# Patient Record
Sex: FEMALE | Race: WHITE | Employment: UNEMPLOYED | URBAN - METROPOLITAN AREA
[De-identification: names, ages, dates, MRNs, and addresses within clinical notes are randomized per-mention and may not be internally consistent; named-entity substitution may affect disease eponyms.]

---

## 2017-02-14 ENCOUNTER — OFFICE VISIT (OUTPATIENT)
Dept: FAMILY MEDICINE CLINIC | Age: 22
End: 2017-02-14

## 2017-02-14 VITALS
BODY MASS INDEX: 43.5 KG/M2 | OXYGEN SATURATION: 97 % | WEIGHT: 254.8 LBS | HEIGHT: 64 IN | RESPIRATION RATE: 18 BRPM | HEART RATE: 95 BPM | TEMPERATURE: 98.5 F | DIASTOLIC BLOOD PRESSURE: 77 MMHG | SYSTOLIC BLOOD PRESSURE: 134 MMHG

## 2017-02-14 DIAGNOSIS — R19.7 ACUTE DIARRHEA: Primary | ICD-10-CM

## 2017-02-14 NOTE — PROGRESS NOTES
Patient: Keshav Abarca MRN: [id number]  SSN: [id number]    YOB: 1995  Age: 24 y.o. Sex: female      Date of Service: 2/14/2017   Provider: Katharine Santos   Office Location:   [address] [place] [facility], [address] [place] [address], [address]  Office Phone: [phone]  Office Fax: [phone]        REASON FOR VISIT:   Chief Complaint   Patient presents with    Diarrhea     started last night, on menstrual cycle that started yesterday         VITALS:   There were no vitals taken for this visit. MEDICATIONS:   Current Outpatient Prescriptions   Medication Sig Dispense Refill    albuterol (PROVENTIL HFA, VENTOLIN HFA, PROAIR HFA) 90 mcg/actuation inhaler Take 2 Puffs by inhalation every four (4) hours as needed for Wheezing. 1 Inhaler 3    QUEtiapine (SEROQUEL) 50 mg tablet Take 1 Tab by mouth two (2) times a day. 60 Tab 5    amitriptyline (ELAVIL) 100 mg tablet Take 1 Tab by mouth nightly.  30 Tab 5        ALLERGIES:   Allergies   Allergen Reactions    Amoxicillin Rash    Codeine Other (comments)     \"mean\"          ACTIVE MEDICAL PROBLEMS:  Patient Active Problem List   Diagnosis Code    Migraine G43.909    Menorrhagia N92.0    Tobacco abuse Z72.0    Asthma J45.909    Depression F32.9        MEDICAL/SURGICAL HISTORY:  Past Medical History   Diagnosis Date    Anxiety and depression     Asthma     Hidradenitis suppurativa     Menorrhagia     Migraine     Tobacco abuse      Started around age 15 years      Past Surgical History   Procedure Laterality Date    Pr drain pilonidal cyst complic  2-70-75     Dr. Lexus Kelley Pr remv pilonidal lesion extens N/A 10/21/2016     Dr. Lexus Kelley Hx other surgical       excision pilonidal cyst        FAMILY HISTORY:  Family History   Problem Relation Age of Onset    Hypertension Mother     Other Mother      anxiety, fibromyalgia    Cancer Paternal Grandmother 54     ovarian        SOCIAL HISTORY:  Social History   Substance Use Topics    Smoking status: Current Every Day Smoker     Packs/day: 0.25     Types: Cigarettes     Start date: 1/1/2009    Smokeless tobacco: Never Used    Alcohol use 6.0 oz/week     0 Standard drinks or equivalent, 10 Shots of liquor per week      Comment: rarely            HISTORY OF PRESENT ILLNESS:   Chava Andre is a 24 y.o. female who presents to the office complaining of diarrhea since last night. Patient describes loose to watery stool. She is needing to move her bowels at least once an hour. Admits to crampy lower abdominal pain, usually relieved by moving her bowels, but denies fevers, chills, nausea, vomiting, or blood in stool. She denies ingestion of raw or undercooked meat. Patient reports she mainly needs a note to miss work, as she had to leave her shift early today due to diarrhea. She works in [employer]. REVIEW OF SYSTEMS:  Review of Systems   Constitutional: Negative for chills and fever. Respiratory: Negative for cough, shortness of breath and wheezing. Cardiovascular: Negative for chest pain. Gastrointestinal: Positive for abdominal pain and diarrhea. Negative for blood in stool, constipation, nausea and vomiting. Musculoskeletal: Negative for myalgias. Neurological: Negative for dizziness. PHYSICAL EXAMINATION:  Physical Exam   Constitutional: She is well-developed, well-nourished, and in no distress. Cardiovascular: Normal rate, regular rhythm and normal heart sounds. Exam reveals no gallop and no friction rub. No murmur heard. Pulmonary/Chest: Effort normal and breath sounds normal. She has no wheezes. She has no rales. Abdominal: Soft. She exhibits no distension and no mass. There is no tenderness. There is no rebound and no guarding. bowel sounds hyperactive   Skin: Skin is warm and dry. No rash noted. RESULTS:  No results found for this visit on 02/14/17.      ASSESSMENT/PLAN:  Chloé was seen today for diarrhea. Diagnoses and all orders for this visit:    Acute diarrhea  - Likely viral in etiology  - Advised supportive care with oral rehydration, BRAT diet  - May use OTC Imodium if needed, but discussed that this can sometimes prolong the course of gastrointestinal illness  - Out of work note provided    Follow up as needed if symptoms worsen or fail to resolve within a few days    Patient expresses understanding and is agreeable with the above plan.         PATIENT CARE TEAM:   Patient Care Team:  Allyn Clayton NP as PCP - General (Nurse Practitioner)       ABDOUL Marinelli   February 14, 2017    1:31 PM

## 2017-02-14 NOTE — LETTER
NOTIFICATION RETURN TO WORK / SCHOOL 
 
2/14/2017 1:22 PM 
 
Ms. Minerva Hammond [address] [facility] To Whom It May Concern: 
 
Chloé Be is currently under the care of [facility]. She will return to work/school on: 2/16/17 If there are questions or concerns please have the patient contact our office.  
 
 
 
Sincerely, 
 
 
 
 
ABDOUL Casillas

## 2017-02-14 NOTE — MR AVS SNAPSHOT
Visit Information Date & Time Provider Department Dept. Phone Encounter #  
 2/14/2017  1:00 PM Austyn [facility] [phone] [id number] Upcoming Health Maintenance Date Due  
 HPV AGE 9Y-34Y (1 of 3 - Female 3 Dose Series) 12/19/2006 Pneumococcal 19-64 Medium Risk (1 of 1 - PPSV23) 12/19/2014 PAP AKA CERVICAL CYTOLOGY 12/19/2016 DTaP/Tdap/Td series (2 - Td) 1/24/2026 Allergies as of 2/14/2017  Review Complete On: 2/14/2017 By: ABDOUL Yeung Severity Noted Reaction Type Reactions Amoxicillin  08/15/2014    Rash Codeine  05/12/2016    Other (comments) \"mean\" Current Immunizations  Never Reviewed Name Date Influenza Vaccine (Quad) PF 9/30/2016 Tdap 1/24/2016 12:54 PM  
  
 Not reviewed this visit Vitals BP Pulse Temp Resp Height(growth percentile) Weight(growth percentile) 134/77 (BP 1 Location: Right arm, BP Patient Position: Sitting) 95 98.5 °F (36.9 °C) (Oral) 18 5' 4\" (1.626 m) 254 lb 12.8 oz (115.6 kg) LMP SpO2 BMI OB Status Smoking Status 02/13/2017 (Exact Date) 97% 43.74 kg/m2 Having regular periods Current Every Day Smoker BMI and BSA Data Body Mass Index Body Surface Area 43.74 kg/m 2 2.28 m 2 Preferred Pharmacy [facility] Phone [facility] [facility] [phone] Your Updated Medication List  
  
   
This list is accurate as of: 2/14/17  1:27 PM.  Always use your most recent med list.  
  
  
  
  
 albuterol 90 mcg/actuation inhaler Commonly known as:  PROVENTIL HFA, VENTOLIN HFA, PROAIR HFA Take 2 Puffs by inhalation every four (4) hours as needed for Wheezing. amitriptyline 100 mg tablet Commonly known as:  ELAVIL Take 1 Tab by mouth nightly. QUEtiapine 50 mg tablet Commonly known as:  SEROquel Take 1 Tab by mouth two (2) times a day. Introducing [facility]! Gabriel Santiago introduces [employer] patient portal. Now you can access parts of your medical record, email your doctor's office, and request medication refills online. 1. In your internet browser, go to [email or web address] 2. Click on the First Time User? Click Here link in the Sign In box. You will see the New Member Sign Up page. 3. Enter your [employer] Access Code exactly as it appears below. You will not need to use this code after youve completed the sign-up process. If you do not sign up before the expiration date, you must request a new code. · [employer] Access Code: [email or web address] Expires: 5/15/2017  1:05 PM 
 
4. Enter the last four digits of your Social Security Number (xxxx) and Date of Birth (mm/dd/yyyy) as indicated and click Submit. You will be taken to the next sign-up page. 5. Create a [employer] ID. This will be your [employer] login ID and cannot be changed, so think of one that is secure and easy to remember. 6. Create a [employer] password. You can change your password at any time. 7. Enter your Password Reset Question and Answer. This can be used at a later time if you forget your password. 8. Enter your e-mail address. You will receive e-mail notification when new information is available in [address] 9. Click Sign Up. You can now view and download portions of your medical record. 10. Click the Download Summary menu link to download a portable copy of your medical information. If you have questions, please visit the Frequently Asked Questions section of the [employer] website. Remember, [employer] is NOT to be used for urgent needs. For medical emergencies, dial 911. Now available from your iPhone and Android! Please provide this summary of care documentation to your next provider. Your primary care clinician is listed as Andreia Mai. If you have any questions after today's visit, please call [phone].

## 2017-03-20 ENCOUNTER — TELEPHONE (OUTPATIENT)
Dept: FAMILY MEDICINE CLINIC | Age: 22
End: 2017-03-20

## 2017-03-20 NOTE — TELEPHONE ENCOUNTER
Call made and message left for Pt to call office. Pt  Missed her last appointment and Daxa Cruz NP wanted to know if she would like to schedule a [facility] appointment.

## 2017-04-11 ENCOUNTER — OFFICE VISIT (OUTPATIENT)
Dept: FAMILY MEDICINE CLINIC | Age: 22
End: 2017-04-11

## 2017-04-11 VITALS
DIASTOLIC BLOOD PRESSURE: 88 MMHG | HEART RATE: 97 BPM | OXYGEN SATURATION: 96 % | SYSTOLIC BLOOD PRESSURE: 135 MMHG | HEIGHT: 64 IN | RESPIRATION RATE: 18 BRPM | BODY MASS INDEX: 43.36 KG/M2 | TEMPERATURE: 98.5 F | WEIGHT: 254 LBS

## 2017-04-11 DIAGNOSIS — L73.2 HIDRADENITIS SUPPURATIVA: Primary | ICD-10-CM

## 2017-04-11 RX ORDER — DOXYCYCLINE 100 MG/1
100 TABLET ORAL 2 TIMES DAILY
Qty: 60 TAB | Refills: 2 | Status: SHIPPED | OUTPATIENT
Start: 2017-04-11 | End: 2017-09-12

## 2017-04-11 RX ORDER — CLINDAMYCIN PHOSPHATE 10 MG/G
GEL TOPICAL 2 TIMES DAILY
Qty: 30 ML | Refills: 2 | Status: SHIPPED | OUTPATIENT
Start: 2017-04-11 | End: 2017-09-12

## 2017-04-11 NOTE — PROGRESS NOTES
Patient: Imer Banks MRN: [id number]  SSN: [id number]    YOB: 1995  Age: 24 y.o. Sex: female      Date of Service: 4/11/2017   Provider: Aria Fortune   Office Location:   [address] [place] [facility], [address] [place] [address], [address]  Office Phone: [phone]  Office Fax: [phone]        REASON FOR VISIT:   Chief Complaint   Patient presents with    Rash     pt presents for rash that pt has under arms and under breast area ot states \" that it is uncomfortable and hurts really bad \" and pt has this on [address] [place] [address] Road area as well         VITALS:   Visit Vitals    /88    Pulse 97    Temp 98.5 °F (36.9 °C) (Oral)    Resp 18    Ht 5' 4\" (1.626 m)    Wt 254 lb (115.2 kg)    LMP 03/15/2017    SpO2 96%    BMI 43.6 kg/m2       MEDICATIONS:   Current Outpatient Prescriptions   Medication Sig Dispense Refill    albuterol (PROVENTIL HFA, VENTOLIN HFA, PROAIR HFA) 90 mcg/actuation inhaler Take 2 Puffs by inhalation every four (4) hours as needed for Wheezing. 1 Inhaler 3    QUEtiapine (SEROQUEL) 50 mg tablet Take 1 Tab by mouth two (2) times a day. 60 Tab 5    amitriptyline (ELAVIL) 100 mg tablet Take 1 Tab by mouth nightly.  30 Tab 5        ALLERGIES:   Allergies   Allergen Reactions    Amoxicillin Rash    Codeine Other (comments)     \"mean\"          ACTIVE MEDICAL PROBLEMS:  Patient Active Problem List   Diagnosis Code    Migraine G43.909    Menorrhagia N92.0    Tobacco abuse Z72.0    Asthma J45.909    Depression F32.9        MEDICAL/SURGICAL HISTORY:  Past Medical History:   Diagnosis Date    Anxiety and depression     Asthma     Hidradenitis suppurativa     Menorrhagia     Migraine     Tobacco abuse     Started around age 15 years      Past Surgical History:   Procedure Laterality Date    DRAIN PILONIDAL CYST COMPLIC  6-02-41    Dr. Lopez Petties    [facility]      excision pilonidal cyst    [address] N/A 10/21/2016     Andrew Herita        FAMILY HISTORY:  Family History   Problem Relation Age of Onset    Hypertension Mother     Other Mother      anxiety, fibromyalgia    Cancer Paternal Grandmother 54     ovarian        SOCIAL HISTORY:  Social History   Substance Use Topics    Smoking status: Current Every Day Smoker     Packs/day: 0.25     Types: Cigarettes     Start date: 1/1/2009    Smokeless tobacco: Never Used    Alcohol use 6.0 oz/week     0 Standard drinks or equivalent, 10 Shots of liquor per week      Comment: rarely            HISTORY OF PRESENT ILLNESS:   Radha Araujo is a 24 y.o. female who presents to the office complaining of worsening hidradenitis suppurativa. Patient reports she was diagnosed with this skin condition as a young teenager, and it has waxed and waned in severity over the years. At one point she was on topical clindamycin as well as PO tetracycline with some relief. Lately, symptoms have been worsening and the lesions are very painful. Seems to be exacerbated by sweating and wearing her work uniform (works at [employer]). Unfortunately she does not have medical insurance currently, and cannot afford to see a dermatologist.    REVIEW OF SYSTEMS:  Review of Systems   Constitutional: Negative for chills and fever. Respiratory: Negative for cough, shortness of breath and wheezing. Cardiovascular: Negative for chest pain and palpitations. Skin: Positive for rash. Negative for itching. PHYSICAL EXAMINATION:  Physical Exam   Constitutional: She is well-developed, well-nourished, and in no distress. obese   Cardiovascular: Normal rate, regular rhythm and normal heart sounds. Exam reveals no gallop and no friction rub. No murmur heard. Pulmonary/Chest: Effort normal and breath sounds normal. She has no wheezes. She has no rales. Skin: Skin is warm and dry.    scattered nodular scars, erythematous nodules and pustules in b/l axillae, under breasts b/l        RESULTS:  No results found for this visit on 04/11/17. ASSESSMENT/PLAN:  Chloé was seen today for rash. Diagnoses and all orders for this visit:    Hidradenitis suppurativa  - Will start PO doxycycline (chosen over tetracycline and minocycline due to cost) and topical clindamycin as below   - Follow up in 1 month  - \[facility] [place] [facility]\"" application provided. Patient would be willing to drive to [place] to see derm if needed  Orders:   -     doxycycline (ADOXA) 100 mg tablet; Take 1 Tab by mouth two (2) times a day. -     clindamycin (CLINDAGEL) 1 % topical gel; Apply  to affected area two (2) times a day. disp: 1 30 g tube    Follow up 1 month  sooner PRN    Patient expresses understanding and is agreeable with the above plan.         PATIENT CARE TEAM:   Patient Care Team:  Romi Tarango NP as PCP - General (Nurse Practitioner)       ABDOUL Henriquez   April 11, 2017    2:15 PM

## 2017-05-20 ENCOUNTER — APPOINTMENT (OUTPATIENT)
Dept: CT IMAGING | Age: 22
End: 2017-05-20
Attending: EMERGENCY MEDICINE
Payer: SELF-PAY

## 2017-05-20 ENCOUNTER — HOSPITAL ENCOUNTER (EMERGENCY)
Age: 22
Discharge: HOME | End: 2017-05-20
Attending: EMERGENCY MEDICINE
Payer: SELF-PAY

## 2017-05-20 VITALS
BODY MASS INDEX: 41.83 KG/M2 | SYSTOLIC BLOOD PRESSURE: 160 MMHG | TEMPERATURE: 98.6 F | HEIGHT: 64 IN | DIASTOLIC BLOOD PRESSURE: 88 MMHG | HEART RATE: 100 BPM | RESPIRATION RATE: 18 BRPM | WEIGHT: 245 LBS | OXYGEN SATURATION: 98 %

## 2017-05-20 DIAGNOSIS — R10.9 RIGHT SIDED ABDOMINAL PAIN: Primary | ICD-10-CM

## 2017-05-20 LAB
ANION GAP BLD CALC-SCNC: 4 MMOL/L (ref 3–18)
APPEARANCE UR: ABNORMAL
BACTERIA URNS QL MICRO: ABNORMAL /HPF
BASOPHILS # BLD AUTO: 0 K/UL (ref 0–0.1)
BASOPHILS # BLD: 0 % (ref 0–2)
BILIRUB UR QL: NEGATIVE
BUN SERPL-MCNC: 8 MG/DL (ref 7–18)
BUN/CREAT SERPL: 11 (ref 12–20)
CALCIUM SERPL-MCNC: 9.1 MG/DL (ref 8.5–10.1)
CHLORIDE SERPL-SCNC: 107 MMOL/L (ref 100–108)
CO2 SERPL-SCNC: 29 MMOL/L (ref 21–32)
COLOR UR: YELLOW
CREAT SERPL-MCNC: 0.76 MG/DL (ref 0.6–1.3)
DIFFERENTIAL METHOD BLD: NORMAL
EOSINOPHIL # BLD: 0.3 K/UL (ref 0–0.4)
EOSINOPHIL NFR BLD: 2 % (ref 0–5)
EPITH CASTS URNS QL MICRO: ABNORMAL /LPF (ref 0–5)
ERYTHROCYTE [DISTWIDTH] IN BLOOD BY AUTOMATED COUNT: 14.3 % (ref 11.6–14.5)
GLUCOSE SERPL-MCNC: 96 MG/DL (ref 74–99)
GLUCOSE UR STRIP.AUTO-MCNC: NEGATIVE MG/DL
HCG UR QL: NEGATIVE
HCT VFR BLD AUTO: 39.8 % (ref 35–45)
HGB BLD-MCNC: 13.6 G/DL (ref 12–16)
HGB UR QL STRIP: ABNORMAL
KETONES UR QL STRIP.AUTO: NEGATIVE MG/DL
LEUKOCYTE ESTERASE UR QL STRIP.AUTO: NEGATIVE
LYMPHOCYTES # BLD AUTO: 26 % (ref 21–52)
LYMPHOCYTES # BLD: 3 K/UL (ref 0.9–3.6)
MCH RBC QN AUTO: 29.1 PG (ref 24–34)
MCHC RBC AUTO-ENTMCNC: 34.2 G/DL (ref 31–37)
MCV RBC AUTO: 85 FL (ref 74–97)
MONOCYTES # BLD: 0.7 K/UL (ref 0.05–1.2)
MONOCYTES NFR BLD AUTO: 6 % (ref 3–10)
NEUTS SEG # BLD: 7.4 K/UL (ref 1.8–8)
NEUTS SEG NFR BLD AUTO: 66 % (ref 40–73)
NITRITE UR QL STRIP.AUTO: NEGATIVE
PH UR STRIP: >8.5 [PH] (ref 5–8)
PLATELET # BLD AUTO: 333 K/UL (ref 135–420)
PMV BLD AUTO: 9.7 FL (ref 9.2–11.8)
POTASSIUM SERPL-SCNC: 4 MMOL/L (ref 3.5–5.5)
PROT UR STRIP-MCNC: 30 MG/DL
RBC # BLD AUTO: 4.68 M/UL (ref 4.2–5.3)
RBC #/AREA URNS HPF: ABNORMAL /HPF (ref 0–5)
SODIUM SERPL-SCNC: 140 MMOL/L (ref 136–145)
SP GR UR REFRACTOMETRY: 1.02 (ref 1–1.03)
UROBILINOGEN UR QL STRIP.AUTO: 1 EU/DL (ref 0.2–1)
WBC # BLD AUTO: 11.3 K/UL (ref 4.6–13.2)
WBC URNS QL MICRO: ABNORMAL /HPF (ref 0–4)

## 2017-05-20 PROCEDURE — 74176 CT ABD & PELVIS W/O CONTRAST: CPT

## 2017-05-20 PROCEDURE — 74011250636 HC RX REV CODE- 250/636: Performed by: EMERGENCY MEDICINE

## 2017-05-20 PROCEDURE — 81001 URINALYSIS AUTO W/SCOPE: CPT | Performed by: EMERGENCY MEDICINE

## 2017-05-20 PROCEDURE — 81025 URINE PREGNANCY TEST: CPT | Performed by: EMERGENCY MEDICINE

## 2017-05-20 PROCEDURE — 96374 THER/PROPH/DIAG INJ IV PUSH: CPT

## 2017-05-20 PROCEDURE — 96361 HYDRATE IV INFUSION ADD-ON: CPT

## 2017-05-20 PROCEDURE — 99283 EMERGENCY DEPT VISIT LOW MDM: CPT

## 2017-05-20 PROCEDURE — 80048 BASIC METABOLIC PNL TOTAL CA: CPT | Performed by: EMERGENCY MEDICINE

## 2017-05-20 PROCEDURE — 85025 COMPLETE CBC W/AUTO DIFF WBC: CPT | Performed by: EMERGENCY MEDICINE

## 2017-05-20 RX ORDER — KETOROLAC TROMETHAMINE 30 MG/ML
30 INJECTION, SOLUTION INTRAMUSCULAR; INTRAVENOUS
Status: COMPLETED | OUTPATIENT
Start: 2017-05-20 | End: 2017-05-20

## 2017-05-20 RX ORDER — NAPROXEN 500 MG/1
TABLET ORAL
Qty: 20 TAB | Refills: 0 | Status: SHIPPED | OUTPATIENT
Start: 2017-05-20 | End: 2017-09-12

## 2017-05-20 RX ORDER — CYCLOBENZAPRINE HCL 5 MG
10 TABLET ORAL
Qty: 20 TAB | Refills: 0 | Status: SHIPPED | OUTPATIENT
Start: 2017-05-20 | End: 2017-09-12

## 2017-05-20 RX ADMIN — KETOROLAC TROMETHAMINE 30 MG: 30 INJECTION, SOLUTION INTRAMUSCULAR at 10:18

## 2017-05-20 RX ADMIN — SODIUM CHLORIDE 1000 ML: 900 INJECTION, SOLUTION INTRAVENOUS at 10:18

## 2017-05-20 NOTE — LETTER
NOTIFICATION RETURN TO WORK / SCHOOL 
 
5/20/2017 11:22 AM 
 
Ms. Naresh Garrett [address] [facility] To Whom It May Concern: 
 
Naresh Garrett is currently under the care of [facility] EMERGENCY DEPT. She will return to work/school on: 5/21/19 If there are questions or concerns please have the patient contact our office.  
 
 
 
Sincerely, 
 
 
 
 
Ambrose Golden MD

## 2017-05-20 NOTE — ED PROVIDER NOTES
HPI Comments: Pt with no significant medical history, presents to ED with complaint of intermittent R flank pain over the past 2-3 weeks. She notes that she is currently on her period, denies possibility of pregnancy. She states the pain sometimes makes her nauseated but denies any vomiting. No constipation or diarrhea, no fevers or chills, no melena or hematochezia, no dysuria or observed hematuria. She denies any personal history of renal stones but notes that her mom has had several stones. She denies any history of endometriosis or fibroids. She is not on OCPs. No other acute symptoms or complaints were noted. Past Medical History:   Diagnosis Date    Anxiety and depression     Asthma     Hidradenitis suppurativa     Menorrhagia     Migraine     Tobacco abuse     Started around age 15 years       Past Surgical History:   Procedure Laterality Date    DRAIN PILONIDAL CYST COMPLIC  3-49-99    Dr. Fede Soto HX OTHER SURGICAL      excision pilonidal cyst    REMV PILONIDAL LESION EXTENS N/A 10/21/2016    Dr. Davion Garcia         Family History:   Problem Relation Age of Onset    Hypertension Mother     Other Mother      anxiety, fibromyalgia    Cancer Paternal Grandmother 54     ovarian       Social History     Social History    Marital status: SINGLE     Spouse name: N/A    Number of children: N/A    Years of education: N/A     Occupational History    Not on file.      Social History Main Topics    Smoking status: Current Every Day Smoker     Packs/day: 0.25     Types: Cigarettes     Start date: 1/1/2009    Smokeless tobacco: Never Used    Alcohol use 6.0 oz/week     0 Standard drinks or equivalent, 10 Shots of liquor per week      Comment: rarely    Drug use: No    Sexual activity: Yes     Partners: Male     Birth control/ protection: None     Other Topics Concern    Not on file     Social History Narrative         ALLERGIES: Amoxicillin and Codeine    Review of Systems   Constitutional: Negative for chills, diaphoresis and fever. HENT: Negative. Eyes: Negative for visual disturbance. Respiratory: Negative for chest tightness and shortness of breath. Cardiovascular: Negative for chest pain and palpitations. Gastrointestinal: Positive for nausea. Negative for abdominal pain, anal bleeding, blood in stool, constipation, diarrhea and vomiting. Endocrine: Negative. Genitourinary: Positive for flank pain. Negative for dysuria and hematuria. Musculoskeletal: Negative for back pain, neck pain and neck stiffness. Skin: Negative for pallor. Allergic/Immunologic: Negative. Neurological: Negative for dizziness, syncope, light-headedness and headaches. Hematological: Does not bruise/bleed easily. Vitals:    05/20/17 0855   BP: 160/88   Pulse: 100   Resp: 18   Temp: 98.6 °F (37 °C)   SpO2: 98%   Weight: 111.1 kg (245 lb)   Height: 5' 4\" (1.626 m)            Physical Exam   Constitutional: She is oriented to person, place, and time. She appears well-developed and well-nourished. No distress. Resting comfortably on stretcher   HENT:   Head: Normocephalic and atraumatic. MM moist   Eyes: Conjunctivae and EOM are normal. No scleral icterus. Sclera clear bilaterally   Neck: Normal range of motion. Neck supple. No JVD present. Non-tender to palpation   Cardiovascular: Normal rate, regular rhythm and normal heart sounds. Exam reveals no gallop and no friction rub. No murmur heard. Pulmonary/Chest: Effort normal and breath sounds normal. No respiratory distress. She has no wheezes. She has no rales. She exhibits no tenderness. No crepitance with palpation   Abdominal: Soft. She exhibits no distension. There is no tenderness. Genitourinary:   Genitourinary Comments: No CVA tenderness   Musculoskeletal: She exhibits no tenderness. Normal inspection of upper extremities. No edema noted to bilateral lower extremities   Lymphadenopathy:     She has no cervical adenopathy. Neurological: She is alert and oriented to person, place, and time. No cranial nerve deficit. She exhibits normal muscle tone. Normal motor and sensation bilaterally to upper and lower extremities   Skin: Skin is warm and dry. She is not diaphoretic. No pallor. Psychiatric:   Normal mood and affect. Vitals reviewed. MDM  Number of Diagnoses or Management Options  Right sided abdominal pain:   Diagnosis management comments: Pt with history of intermittent flank pain that is not reproducible on exam.  Reassuring VS.  Family history of stones. Will check labs and CT, no evidence of UTI or systemic infection. IVF ordered. Reassess. Reviewed results with pt. Repeat abdominal exam remains benign. No emergent conditions identified on ED visit. Will d/c to home with Rx for NSAIDs, muscle relaxer. PMD follow up recommended. Pt in agreement with discharge plans.        Amount and/or Complexity of Data Reviewed  Clinical lab tests: ordered and reviewed  Tests in the radiology section of CPT®: ordered and reviewed  Decide to obtain previous medical records or to obtain history from someone other than the patient: yes  Obtain history from someone other than the patient: yes  Independent visualization of images, tracings, or specimens: yes    Risk of Complications, Morbidity, and/or Mortality  Presenting problems: moderate  Management options: moderate      ED Course       Procedures

## 2017-05-20 NOTE — DISCHARGE INSTRUCTIONS

## 2017-07-21 ENCOUNTER — HOSPITAL ENCOUNTER (EMERGENCY)
Age: 22
Discharge: HOME | End: 2017-07-21
Attending: EMERGENCY MEDICINE | Admitting: EMERGENCY MEDICINE
Payer: SELF-PAY

## 2017-07-21 VITALS
SYSTOLIC BLOOD PRESSURE: 137 MMHG | RESPIRATION RATE: 16 BRPM | OXYGEN SATURATION: 99 % | DIASTOLIC BLOOD PRESSURE: 79 MMHG | HEART RATE: 85 BPM | WEIGHT: 245 LBS | BODY MASS INDEX: 42.05 KG/M2 | TEMPERATURE: 98.3 F

## 2017-07-21 DIAGNOSIS — L02.91 ABSCESS: Primary | ICD-10-CM

## 2017-07-21 PROCEDURE — 99282 EMERGENCY DEPT VISIT SF MDM: CPT

## 2017-07-21 RX ORDER — SULFAMETHOXAZOLE AND TRIMETHOPRIM 800; 160 MG/1; MG/1
1 TABLET ORAL 2 TIMES DAILY
Qty: 20 TAB | Refills: 0 | Status: SHIPPED | OUTPATIENT
Start: 2017-07-21 | End: 2017-07-31

## 2017-07-21 RX ORDER — TRAMADOL HYDROCHLORIDE 50 MG/1
50 TABLET ORAL
Qty: 8 TAB | Refills: 0 | Status: SHIPPED | OUTPATIENT
Start: 2017-07-21 | End: 2017-09-12

## 2017-07-21 NOTE — DISCHARGE INSTRUCTIONS

## 2017-07-21 NOTE — ED TRIAGE NOTES
\"I have a skin condition and recently I noticed a large bump on the side of my stomach that has now turned into two bumps and its really dark and draining. \"

## 2017-07-21 NOTE — ED PROVIDER NOTES
HPI Comments: Hx of hidradenitis suppurative presenting to ED with draining lesions on abdomen which has worsened over past few days. Hx of similar. No fever, vomiting, or any other complaints. States she has noted drainage but has resolved. States job wants note to return to work. Patient is a 24 y.o. female presenting with skin problem. The history is provided by the patient. Skin Problem   This is a recurrent problem. Episode onset: chronic. The problem occurs constantly. The problem has not changed since onset. Past Medical History:   Diagnosis Date    Anxiety and depression     Asthma     Hidradenitis suppurativa     Menorrhagia     Migraine     Tobacco abuse     Started around age 15 years       Past Surgical History:   Procedure Laterality Date    DRAIN PILONIDAL CYST COMPLIC  3-16-69    Dr. Alok Orozco HX OTHER SURGICAL      excision pilonidal cyst    REMV PILONIDAL LESION EXTENS N/A 10/21/2016    Dr. Zachariah Tobin         Family History:   Problem Relation Age of Onset    Hypertension Mother     Other Mother      anxiety, fibromyalgia    Cancer Paternal Grandmother 54     ovarian       Social History     Social History    Marital status: SINGLE     Spouse name: N/A    Number of children: N/A    Years of education: N/A     Occupational History    Not on file.      Social History Main Topics    Smoking status: Current Every Day Smoker     Packs/day: 0.25     Types: Cigarettes     Start date: 1/1/2009    Smokeless tobacco: Never Used    Alcohol use 6.0 oz/week     0 Standard drinks or equivalent, 10 Shots of liquor per week      Comment: rarely    Drug use: No    Sexual activity: Yes     Partners: Male     Birth control/ protection: None     Other Topics Concern    Not on file     Social History Narrative         ALLERGIES: Amoxicillin and Codeine    Review of Systems    Vitals:    07/21/17 1137   BP: 137/79   Pulse: 85   Resp: 16   Temp: 98.3 °F (36.8 °C)   SpO2: 99%   Weight: 111.1 kg (245 lb)            Physical Exam   Constitutional: She is oriented to person, place, and time. She appears well-developed and well-nourished. No distress. HENT:   Head: Normocephalic and atraumatic. Mouth/Throat: Oropharynx is clear and moist.   Eyes: Conjunctivae are normal.   Neck: Normal range of motion. Neck supple. Cardiovascular: Normal rate, regular rhythm and normal heart sounds. Pulmonary/Chest: Effort normal and breath sounds normal. No respiratory distress. She has no wheezes. She exhibits no tenderness. Abdominal: Soft. She exhibits no distension. There is no tenderness. There is no rebound. Musculoskeletal: Normal range of motion. Neurological: She is alert and oriented to person, place, and time. No cranial nerve deficit. Coordination normal.   Skin: Skin is warm and dry. No rash noted. She is not diaphoretic.   mulitple purulent lesions to pannus of abdomen/pelvis with localized erythema and hyperpigmentation consistent with furuncles/carbuncles. No drainage or bleeding. No induration or fluctuance noted. Psychiatric: She has a normal mood and affect. Nursing note and vitals reviewed. MDM  Number of Diagnoses or Management Options  Diagnosis management comments: No indication for further I&D, lesions have drained. Will place on po abx and have pt f/u with pcp . return symptoms discussed particular fever, vomiting,  Cellulitis.     ED Course       Procedures

## 2017-09-12 ENCOUNTER — OFFICE VISIT (OUTPATIENT)
Dept: FAMILY MEDICINE CLINIC | Age: 22
End: 2017-09-12

## 2017-09-12 VITALS
OXYGEN SATURATION: 97 % | DIASTOLIC BLOOD PRESSURE: 69 MMHG | RESPIRATION RATE: 18 BRPM | HEART RATE: 95 BPM | BODY MASS INDEX: 42.68 KG/M2 | WEIGHT: 250 LBS | HEIGHT: 64 IN | SYSTOLIC BLOOD PRESSURE: 137 MMHG | TEMPERATURE: 97.2 F

## 2017-09-12 DIAGNOSIS — L73.2 HIDRADENITIS SUPPURATIVA: ICD-10-CM

## 2017-09-12 DIAGNOSIS — J30.2 SEASONAL ALLERGIC RHINITIS, UNSPECIFIED ALLERGIC RHINITIS TRIGGER: Primary | ICD-10-CM

## 2017-09-12 RX ORDER — SULFAMETHOXAZOLE AND TRIMETHOPRIM 800; 160 MG/1; MG/1
1 TABLET ORAL 2 TIMES DAILY
Qty: 20 TAB | Refills: 0 | Status: SHIPPED | OUTPATIENT
Start: 2017-09-12 | End: 2017-09-22

## 2017-09-12 RX ORDER — FLUTICASONE PROPIONATE 50 MCG
2 SPRAY, SUSPENSION (ML) NASAL DAILY
Qty: 1 BOTTLE | Refills: 0 | Status: SHIPPED | OUTPATIENT
Start: 2017-09-12 | End: 2022-01-13

## 2017-09-12 RX ORDER — LORATADINE 10 MG/1
10 TABLET ORAL DAILY
Qty: 100 TAB | Refills: 0 | Status: SHIPPED | OUTPATIENT
Start: 2017-09-12 | End: 2022-01-13

## 2017-09-12 NOTE — PROGRESS NOTES
HISTORY OF PRESENT ILLNESS  Poncho Salazar is a 24 y.o. female. 9/12/2017  2:39 PM    Chief Complaint   Patient presents with    Sore Throat     patient experiencing sore throat with a productive cough, clear sputum  x 1 day        HPI: Here today for complaints of ear popping and fullness, sneezing, sore throat, runny nose with some congestion, and an occasionally cough productive of clear phlegm. Has not been taking anything for symptoms. Symptoms have been occuring for a few days, but worse today. No sick contacts. Did not get flu shot. Would like to get an Rx for Bactrim to take in case of flare of hidradenitis suppurativa. Last CPE 10/2016. Review of Systems   Constitutional: Positive for malaise/fatigue. Negative for chills and fever. HENT: Positive for congestion, ear pain and sore throat. Eyes: Negative for double vision, photophobia and pain. Respiratory: Positive for cough. Negative for shortness of breath and wheezing. Cardiovascular: Negative for chest pain, palpitations and leg swelling. Gastrointestinal: Negative for abdominal pain, constipation, diarrhea, nausea and vomiting. Genitourinary: Negative for dysuria, frequency and urgency. Musculoskeletal: Negative for back pain, joint pain and myalgias. Skin: Negative for rash. Neurological: Negative for dizziness, weakness and headaches. Psychiatric/Behavioral: Negative for depression, substance abuse and suicidal ideas. The patient is not nervous/anxious and does not have insomnia.          PHQ Screening   PHQ over the last two weeks 9/12/2017   Little interest or pleasure in doing things Not at all   Feeling down, depressed or hopeless Not at all   Total Score PHQ 2 0         History  Past Medical History:   Diagnosis Date    Anxiety and depression     Asthma     Hidradenitis suppurativa     Menorrhagia     Migraine     Tobacco abuse     Started around age 15 years       Past Surgical History:   Procedure Laterality Date    DRAIN PILONIDAL CYST COMPLIC  3-28-22    Dr. Zackery Matthew    HX OTHER SURGICAL      excision pilonidal cyst    REMV PILONIDAL LESION EXTENS N/A 10/21/2016    Dr. Zackery Matthew       Social History     Social History    Marital status: SINGLE     Spouse name: N/A    Number of children: N/A    Years of education: N/A     Occupational History    Not on file. Social History Main Topics    Smoking status: Current Every Day Smoker     Packs/day: 0.25     Types: Cigarettes     Start date: 1/1/2009    Smokeless tobacco: Never Used    Alcohol use 1.2 oz/week     0 Standard drinks or equivalent, 2 Shots of liquor per week      Comment: rarely    Drug use: No    Sexual activity: Yes     Partners: Male     Birth control/ protection: None     Other Topics Concern    Not on file     Social History Narrative       Allergies   Allergen Reactions    Amoxicillin Rash    Codeine Other (comments)     \"mean\"         Current Outpatient Prescriptions   Medication Sig Dispense Refill    albuterol (PROVENTIL HFA, VENTOLIN HFA, PROAIR HFA) 90 mcg/actuation inhaler Take 2 Puffs by inhalation every four (4) hours as needed for Wheezing. 1 Inhaler 3         Patient Care Team:  Patient Care Team:  Tali Naqvi NP as PCP - General (Nurse Practitioner)        LABS:  None new to review    RADIOLOGY:  None new to review      Physical Exam   Constitutional: She is oriented to person, place, and time. She appears well-developed and well-nourished. No distress. HENT:   Head: Normocephalic. Right Ear: External ear and ear canal normal. Tympanic membrane is bulging. Tympanic membrane is not injected. A middle ear effusion is present. Left Ear: External ear and ear canal normal. Tympanic membrane is bulging. Tympanic membrane is not injected. A middle ear effusion is present. Nose: Mucosal edema and rhinorrhea present. Right sinus exhibits no maxillary sinus tenderness and no frontal sinus tenderness.  Left sinus exhibits no maxillary sinus tenderness and no frontal sinus tenderness. Mouth/Throat: Uvula is midline and mucous membranes are normal. Posterior oropharyngeal erythema present. No oropharyngeal exudate, posterior oropharyngeal edema or tonsillar abscesses. Eyes: EOM are normal. Pupils are equal, round, and reactive to light. Neck: Normal range of motion. Neck supple. No thyromegaly present. Cardiovascular: Normal rate, regular rhythm and normal heart sounds. No murmur heard. Pulmonary/Chest: Effort normal and breath sounds normal. No respiratory distress. Abdominal: Soft. Bowel sounds are normal. There is no tenderness. Lymphadenopathy:     She has no cervical adenopathy. Neurological: She is alert and oriented to person, place, and time. Skin: Skin is warm and dry. Psychiatric: Her speech is normal.        Vitals:    09/12/17 1416 09/12/17 1420   BP: 140/73 137/69   Pulse: 99 95   Resp:  18   Temp: 97.2 °F (36.2 °C)    TempSrc: Oral    SpO2: 98% 97%   Weight: 250 lb (113.4 kg)    Height: 5' 4\" (1.626 m)    PainSc:   8    PainLoc: Throat        ASSESSMENT and PLAN  Diagnoses and all orders for this visit:    Seasonal allergic rhinitis, unspecified allergic rhinitis trigger  *Appears to be allergic rhinitis in nature causing upper respiratory symptoms. Advised on symptom management including daily antihistamine and steroid nasal spray. PRN decongestant. *Call or RTO if not improving in about 1-2 weeks. -     loratadine (CLARITIN) 10 mg tablet; Take 1 Tab by mouth daily. -     fluticasone (FLONASE) 50 mcg/actuation nasal spray; 2 Sprays by Both Nostrils route daily. Hidradenitis suppurativa  *Sent in Rx for use in case of flare   -     trimethoprim-sulfamethoxazole (BACTRIM DS, SEPTRA DS) 160-800 mg per tablet; Take 1 Tab by mouth two (2) times a day for 10 days. *Plan of care reviewed with patient. Patient in agreement with plan and expresses understanding.  All questions answered and patient encouraged to call or RTO if further questions or concerns. Follow-up Disposition:  Return if symptoms worsen or fail to improve.

## 2017-09-12 NOTE — LETTER
NOTIFICATION RETURN TO WORK / SCHOOL 
 
9/12/2017 2:41 PM 
 
Ms. Jason Ambrocio [address] [facility] To Whom It May Concern: 
 
Jason Ambrocio is currently under the care of [facility]. She was seen today in our office. Please excuse her from work today and tomorrow. If there are questions or concerns please have the patient contact our office.  
 
 
 
Sincerely, 
 
 
Linda Herrera, NP

## 2017-09-18 ENCOUNTER — TELEPHONE (OUTPATIENT)
Dept: FAMILY MEDICINE CLINIC | Age: 22
End: 2017-09-18

## 2017-09-18 NOTE — TELEPHONE ENCOUNTER
Patient called and stated that she was informed by Bee Crowe to call the office and let her know if her allergies have not gotten any better and she wanted to inform her that they have not gotten better at all. Would like to know if something else could be called in to replace the medication that she was prescribed during the previous visit.

## 2017-09-19 ENCOUNTER — TELEPHONE (OUTPATIENT)
Dept: FAMILY MEDICINE CLINIC | Age: 22
End: 2017-09-19

## 2017-09-19 RX ORDER — AZITHROMYCIN 250 MG/1
TABLET, FILM COATED ORAL
Qty: 6 TAB | Refills: 0 | Status: SHIPPED | OUTPATIENT
Start: 2017-09-19 | End: 2017-09-24

## 2017-09-19 NOTE — TELEPHONE ENCOUNTER
Patient states that she was informed during her last visit to call back if her symptoms have not improved and she states that they have actually gotten worse. She would like to know if the medication that was mentioned could be called in to her listed pharmacy.

## 2017-09-19 NOTE — TELEPHONE ENCOUNTER
9/19/2017  1:51 PM    Chief Complaint   Patient presents with    Request For New Medication       Called patient at this time regarding medication request. This is related to ENT complaints, not skin complaints that Bactrim was sent into the pharmacy for. She reports that she continues to have similar symptoms as before and is coughing more. Denies any SOB or wheezing. Has been taking OTC medications helps some. Will send in [address] at this time in case of bacterial component due to duration of symptoms. She missed work yesterday- work note printed for her to . Cancelled appointment for later today- not needed. She will RTO for further evaluation if not improving after Zpack. *Plan of care reviewed with patient. Patient in agreement with plan and expresses understanding. All questions answered and patient encouraged to call or RTO if further questions or concerns.

## 2017-09-19 NOTE — LETTER
NOTIFICATION RETURN TO WORK / SCHOOL 
 
9/19/2017 1:51 PM 
 
Ms. Lita Riojas [address] [facility] To Whom It May Concern: 
 
Lita Riojas is currently under the care of [facility]. Please excuse her from work 9/18/17 for illness. If there are questions or concerns please have the patient contact our office.  
 
 
 
Sincerely, 
 
 
Sobeida Chi NP

## 2017-09-19 NOTE — TELEPHONE ENCOUNTER
9/19/2017  8:18 AM    Chief Complaint   Patient presents with    Sinus Infection       Attempted to contact patient at this time to discuss symptoms and consider alternative treatment. Noted that she is scheduled for today; however, I most likely could have taken care of this over the phone. Unable to reach because all phone numbers are out of service or unable to take calls.

## 2017-09-19 NOTE — TELEPHONE ENCOUNTER
Call made to SONU flower for her to call office and that she can call her pharmacy for the medication that has been called in. AB called in by the provider at last visit just in case she had a flare up. Forwarded to the provide for review.

## 2018-04-09 ENCOUNTER — HOSPITAL ENCOUNTER (EMERGENCY)
Age: 23
Discharge: HOME | End: 2018-04-09
Attending: EMERGENCY MEDICINE
Payer: SELF-PAY

## 2018-04-09 VITALS
DIASTOLIC BLOOD PRESSURE: 98 MMHG | HEART RATE: 82 BPM | RESPIRATION RATE: 18 BRPM | OXYGEN SATURATION: 99 % | SYSTOLIC BLOOD PRESSURE: 151 MMHG | HEIGHT: 64 IN | BODY MASS INDEX: 44.39 KG/M2 | WEIGHT: 260 LBS | TEMPERATURE: 98.7 F

## 2018-04-09 DIAGNOSIS — L03.119 CELLULITIS AND ABSCESS OF LEG, EXCEPT FOOT: Primary | ICD-10-CM

## 2018-04-09 DIAGNOSIS — L02.419 CELLULITIS AND ABSCESS OF LEG, EXCEPT FOOT: Primary | ICD-10-CM

## 2018-04-09 LAB — HCG UR QL: NEGATIVE

## 2018-04-09 PROCEDURE — 74011000250 HC RX REV CODE- 250: Performed by: EMERGENCY MEDICINE

## 2018-04-09 PROCEDURE — 77030019895 HC PCKNG STRP IODO -A

## 2018-04-09 PROCEDURE — 81025 URINE PREGNANCY TEST: CPT | Performed by: EMERGENCY MEDICINE

## 2018-04-09 PROCEDURE — 75810000289 HC I&D ABSCESS SIMP/COMP/MULT

## 2018-04-09 PROCEDURE — 99283 EMERGENCY DEPT VISIT LOW MDM: CPT

## 2018-04-09 PROCEDURE — 74011250637 HC RX REV CODE- 250/637: Performed by: EMERGENCY MEDICINE

## 2018-04-09 RX ORDER — IBUPROFEN 800 MG/1
800 TABLET ORAL
Qty: 20 TAB | Refills: 0 | Status: SHIPPED | OUTPATIENT
Start: 2018-04-09 | End: 2018-04-16

## 2018-04-09 RX ORDER — OXYCODONE AND ACETAMINOPHEN 5; 325 MG/1; MG/1
TABLET ORAL
Qty: 8 TAB | Refills: 0 | Status: SHIPPED | OUTPATIENT
Start: 2018-04-09 | End: 2018-05-09

## 2018-04-09 RX ORDER — LIDOCAINE HYDROCHLORIDE AND EPINEPHRINE 10; 10 MG/ML; UG/ML
10 INJECTION, SOLUTION INFILTRATION; PERINEURAL ONCE
Status: COMPLETED | OUTPATIENT
Start: 2018-04-09 | End: 2018-04-09

## 2018-04-09 RX ORDER — HYDROCODONE BITARTRATE AND ACETAMINOPHEN 5; 325 MG/1; MG/1
1 TABLET ORAL
Status: COMPLETED | OUTPATIENT
Start: 2018-04-09 | End: 2018-04-09

## 2018-04-09 RX ORDER — SULFAMETHOXAZOLE AND TRIMETHOPRIM 800; 160 MG/1; MG/1
1 TABLET ORAL 2 TIMES DAILY
Qty: 20 TAB | Refills: 0 | Status: SHIPPED | OUTPATIENT
Start: 2018-04-09 | End: 2018-04-19

## 2018-04-09 RX ADMIN — HYDROCODONE BITARTRATE AND ACETAMINOPHEN 1 TABLET: 5; 325 TABLET ORAL at 12:08

## 2018-04-09 RX ADMIN — LIDOCAINE HYDROCHLORIDE AND EPINEPHRINE 100 MG: 10; 10 INJECTION, SOLUTION INFILTRATION; PERINEURAL at 12:09

## 2018-04-09 NOTE — DISCHARGE INSTRUCTIONS

## 2018-04-09 NOTE — ED PROVIDER NOTES
EMERGENCY DEPARTMENT HISTORY AND PHYSICAL EXAM    11:25 AM      Date: 4/9/2018  Patient Name: Analisa Collado    History of Presenting Illness     Chief Complaint   Patient presents with    Skin Problem         History Provided By: Patient    Chief Complaint: Abscess  Duration:  1 weel  Timing:  Progressive and Worsening  Location: L groin  Quality: Not reported  Severity: 6 out of 10  Modifying Factors: None  Associated Symptoms: denies any other associated signs or symptoms      Additional History (Context): Analisa Collado is a 25 y.o. female with PMHx of hidradenitis suppurativa who presents to the ED with c/o progressively worsening L groin abscess with 6/10 pain,  present for hte past week. Patient reports she has had 2x surgeries in the past on her back for similar abscesses. Admits she was on Doxycycline as a prophylaxis but has stopped taking it. Denies any use of antibiotics currently. Unsure of LNMP, denies chance of pregnancy. Claims she is followed by [facility]. No modifying or aggravating factors were reported. No other symptoms or concerns were expressed. PCP: Chase Garsia NP    Current Outpatient Prescriptions   Medication Sig Dispense Refill    trimethoprim-sulfamethoxazole (BACTRIM DS) 160-800 mg per tablet Take 1 Tab by mouth two (2) times a day for 10 days. 20 Tab 0    ibuprofen (MOTRIN) 800 mg tablet Take 1 Tab by mouth every six (6) hours as needed for Pain for up to 7 days. 20 Tab 0    oxyCODONE-acetaminophen (PERCOCET) 5-325 mg per tablet Take 1 tablet every 4-6 hours as needed for pain control. If you were instructed to try over the counter ibuprofen or tylenol, only take the percocet for pain not controlled with the over the counter medication. 8 Tab 0    loratadine (CLARITIN) 10 mg tablet Take 1 Tab by mouth daily. 100 Tab 0    fluticasone (FLONASE) 50 mcg/actuation nasal spray 2 Sprays by Both Nostrils route daily.  1 Bottle 0    albuterol (PROVENTIL HFA, VENTOLIN HFA, PROAIR HFA) 90 mcg/actuation inhaler Take 2 Puffs by inhalation every four (4) hours as needed for Wheezing. 1 Inhaler 3       Past History     Past Medical History:  Past Medical History:   Diagnosis Date    Anxiety and depression     Asthma     Hidradenitis suppurativa     Menorrhagia     Migraine     Tobacco abuse     Started around age 15 years       Past Surgical History:  Past Surgical History:   Procedure Laterality Date    DRAIN PILONIDAL CYST COMPLIC  9-55-09    Dr. Naye Ramirez HX OTHER SURGICAL      excision pilonidal cyst    REMV PILONIDAL LESION EXTENS N/A 10/21/2016    Dr. Юлия More       Family History:  Family History   Problem Relation Age of Onset    Hypertension Mother     Other Mother      anxiety, fibromyalgia    Cancer Paternal Grandmother 54     ovarian       Social History:  Social History   Substance Use Topics    Smoking status: Current Every Day Smoker     Packs/day: 0.25     Types: Cigarettes     Start date: 1/1/2009    Smokeless tobacco: Never Used    Alcohol use 1.2 oz/week     0 Standard drinks or equivalent, 2 Shots of liquor per week      Comment: rarely       Allergies: Allergies   Allergen Reactions    Amoxicillin Rash    Codeine Other (comments)     \"mean\"           Review of Systems       Review of Systems   Constitutional: Negative for activity change, fatigue and fever. HENT: Negative for congestion and rhinorrhea. Eyes: Negative for visual disturbance. Respiratory: Negative for shortness of breath. Cardiovascular: Negative for chest pain and palpitations. Gastrointestinal: Negative for abdominal pain, diarrhea, nausea and vomiting. Genitourinary: Negative for dysuria and hematuria. Musculoskeletal: Negative for back pain. Skin:        Positive for abscess L groin   Neurological: Negative for dizziness, weakness and light-headedness. All other systems reviewed and are negative.         Physical Exam     Visit Vitals  BP (!) 151/98 (BP 1 Location: Left arm, BP Patient Position: At rest)    Pulse 82    Temp 98.7 °F (37.1 °C)    Resp 18    Ht 5' 4\" (1.626 m)    Wt 117.9 kg (260 lb)    SpO2 99%    BMI 44.63 kg/m2         Physical Exam   Constitutional: She is oriented to person, place, and time. She appears well-developed and well-nourished. No distress. HENT:   Head: Normocephalic and atraumatic. Right Ear: External ear normal.   Left Ear: External ear normal.   Nose: Nose normal.   Mouth/Throat: Oropharynx is clear and moist.   Eyes: Conjunctivae and EOM are normal. Pupils are equal, round, and reactive to light. No scleral icterus. Neck: Normal range of motion. Neck supple. No JVD present. No tracheal deviation present. No thyromegaly present. Cardiovascular: Normal rate, regular rhythm, normal heart sounds and intact distal pulses. Exam reveals no gallop and no friction rub. No murmur heard. Pulmonary/Chest: Effort normal and breath sounds normal. She exhibits no tenderness. Abdominal: Soft. Bowel sounds are normal. She exhibits no distension. There is no tenderness. There is no rebound and no guarding. Musculoskeletal: Normal range of motion. She exhibits no edema or tenderness. Lymphadenopathy:     She has no cervical adenopathy. Neurological: She is alert and oriented to person, place, and time. No cranial nerve deficit. Coordination normal.   No sensory loss, Gait normal, Motor 5/5   Skin: Skin is warm and dry. There is erythema. Multiple areas of scarring from previous skin infection. L groin with a 3x2 cm area of fluctuance and erythema with some bruising noted. Psychiatric: She has a normal mood and affect. Her behavior is normal. Judgment and thought content normal.   Nursing note and vitals reviewed.     I&D Abcess Complex  Date/Time: 4/9/2018 12:15 PM  Performed by: Cristhian Phillips by: Brianna Bosch     Consent:     Consent obtained:  Verbal and written    Consent given by:  Patient    Risks discussed:  Bleeding, incomplete drainage, pain and infection    Alternatives discussed:  No treatment and delayed treatment  Location:     Type:  Abscess    Location:  Lower extremity    Lower extremity location:  Leg    Leg location:  L upper leg  Pre-procedure details:     Skin preparation:  Betadine  Anesthesia (see MAR for exact dosages): Anesthesia method:  Local infiltration    Local anesthetic:  Lidocaine 1% WITH epi  Procedure type:     Complexity:  Complex  Procedure details:     Needle aspiration: no      Incision types:  Stab incision    Incision depth:  Subcutaneous    Scalpel blade:  11    Wound management:  Probed and deloculated and irrigated with saline    Drainage:  Purulent and bloody    Drainage amount: Moderate    Packing materials:  1/4 in iodoform gauze  Post-procedure details:     Patient tolerance of procedure: Tolerated well, no immediate complications  Comments:      Pt reports immediate relief of pressure  Pt was dc after procedure            Diagnostic Study Results     Labs -  Recent Results (from the past 12 hour(s))   HCG URINE, QL    Collection Time: 04/09/18 11:31 AM   Result Value Ref Range    HCG urine, QL NEGATIVE  NEG         Radiologic Studies -   No orders to display         Medical Decision Making   I am the first provider for this patient. I reviewed the vital signs, available nursing notes, past medical history, past surgical history, family history and social history. Vital Signs-Reviewed the patient's vital signs. Pulse Oximetry Analysis -  99% on room air, normal    Records Reviewed: Nursing Notes (Time of Review: 11:25 AM)    ED Course: Progress Notes, Reevaluation, and Consults:    Provider Notes (Medical Decision Making): 25 y.o. with history of hidradenitis suppurativa with 2 surgeries in the past presents with L groin abscess. Denies fever or chills. Patient was on Doxycycline for prophylactic use but has stopped it.  Unsure if pregnant, will obtain urine pregnancy. OLIVER Spektor will I&D and will plan to treat for MRSA with close outpatient care. Procedures: I&D, successful    Core Measures: none    Critical Care Time: 0        Diagnosis     Clinical Impression:   1. Cellulitis and abscess of leg, except foot        Disposition: home, f/u in 2 days for wound recheck/repacking    Follow-up Information     Follow up With Details Comments Contact Info    [facility] EMERGENCY DEPT In 2 days For wound re-check. Return sooner for any changes or new symptoms [address] [place] [id number]  [phone]           Patient's Medications   Start Taking    IBUPROFEN (MOTRIN) 800 MG TABLET    Take 1 Tab by mouth every six (6) hours as needed for Pain for up to 7 days. OXYCODONE-ACETAMINOPHEN (PERCOCET) 5-325 MG PER TABLET    Take 1 tablet every 4-6 hours as needed for pain control. If you were instructed to try over the counter ibuprofen or tylenol, only take the percocet for pain not controlled with the over the counter medication. TRIMETHOPRIM-SULFAMETHOXAZOLE (BACTRIM DS) 160-800 MG PER TABLET    Take 1 Tab by mouth two (2) times a day for 10 days. Continue Taking    ALBUTEROL (PROVENTIL HFA, VENTOLIN HFA, PROAIR HFA) 90 MCG/ACTUATION INHALER    Take 2 Puffs by inhalation every four (4) hours as needed for Wheezing. FLUTICASONE (FLONASE) 50 MCG/ACTUATION NASAL SPRAY    2 Sprays by Both Nostrils route daily. LORATADINE (CLARITIN) 10 MG TABLET    Take 1 Tab by mouth daily.    These Medications have changed    No medications on file   Stop Taking    No medications on file     _______________________________    Attestations:  Carlo Billingsley acting as a scribe for and in the presence of June Foster MD      April 09, 2018 at 11:25 AM       Provider Attestation:      I personally performed the services described in the documentation, reviewed the documentation, as recorded by the scribe in my presence, and it accurately and completely records my words and actions.  April 09, 2018 at 11:25 AM - Josias Holder MD    _______________________________

## 2018-05-09 ENCOUNTER — OFFICE VISIT (OUTPATIENT)
Dept: FAMILY MEDICINE CLINIC | Age: 23
End: 2018-05-09

## 2018-05-09 VITALS
HEART RATE: 92 BPM | BODY MASS INDEX: 44.22 KG/M2 | HEIGHT: 64 IN | OXYGEN SATURATION: 99 % | DIASTOLIC BLOOD PRESSURE: 95 MMHG | WEIGHT: 259 LBS | TEMPERATURE: 97.5 F | RESPIRATION RATE: 18 BRPM | SYSTOLIC BLOOD PRESSURE: 149 MMHG

## 2018-05-09 DIAGNOSIS — F33.9 RECURRENT MAJOR DEPRESSIVE DISORDER, REMISSION STATUS UNSPECIFIED (HCC): Primary | ICD-10-CM

## 2018-05-09 DIAGNOSIS — R45.86 MOOD SWING: ICD-10-CM

## 2018-05-09 DIAGNOSIS — E66.01 CLASS 3 SEVERE OBESITY DUE TO EXCESS CALORIES WITHOUT SERIOUS COMORBIDITY WITH BODY MASS INDEX (BMI) OF 40.0 TO 44.9 IN ADULT (HCC): ICD-10-CM

## 2018-05-09 DIAGNOSIS — F41.9 ANXIOUS MOOD: ICD-10-CM

## 2018-05-09 DIAGNOSIS — F50.81 BINGE EATING DISORDER: ICD-10-CM

## 2018-05-09 RX ORDER — FLUOXETINE HYDROCHLORIDE 20 MG/1
20 CAPSULE ORAL DAILY
Qty: 30 CAP | Refills: 3 | Status: SHIPPED | OUTPATIENT
Start: 2018-05-09 | End: 2019-09-15 | Stop reason: SDUPTHER

## 2018-05-09 NOTE — PROGRESS NOTES
Linda Lainez is a 25 y.o. female here today with c/o depression and becoming anti social and would like to possible start Zoloft. Learning assessment previously completed.

## 2018-05-09 NOTE — PROGRESS NOTES
HISTORY OF PRESENT ILLNESS  Omar Klein is a 25 y.o. female. HPI  Omar Klein is a 25 y.o. female who presents to the office today for depression. She is new to me. She was previously seeing Evangelina Madrigal. She has a hx of depression and anger issues since she was a teen. Mom wanted to seek medical treatment, but at that time Chloé was resistant and did not want to go to a therapist. She says she is tired of feeling down. She will cry for no reason. She sleeps a lot and sometimes wakes up in the late afternoon. Her appetite has increased and she finds herself binge eating at night. She will call her mom afterwards and cry because she feels guilty about it. She has mood swings, snaps at her boyfriend and parents. Her biological father has bipolar disorder. Mom has depression, anxiety and fibromyalgia. She had thoughts of suicide many years ago, but denies active and current thoughts or ideations of S/H. Smokes 1/2 ppd, mainly because this helps calm her down. She will drink alcohol occasionally, few times/month. Denies drug use. She is living in an apartment with her boyfriend, and finances stress her out. She currently works at [employer], but is trying to get a job with [employer] which will be a $4/hour raise. Chief Complaint   Patient presents with    Depression       Current Outpatient Prescriptions on File Prior to Visit   Medication Sig Dispense Refill    loratadine (CLARITIN) 10 mg tablet Take 1 Tab by mouth daily. 100 Tab 0    fluticasone (FLONASE) 50 mcg/actuation nasal spray 2 Sprays by Both Nostrils route daily. 1 Bottle 0    albuterol (PROVENTIL HFA, VENTOLIN HFA, PROAIR HFA) 90 mcg/actuation inhaler Take 2 Puffs by inhalation every four (4) hours as needed for Wheezing. 1 Inhaler 3    oxyCODONE-acetaminophen (PERCOCET) 5-325 mg per tablet Take 1 tablet every 4-6 hours as needed for pain control.   If you were instructed to try over the counter ibuprofen or tylenol, only take the percocet for pain not controlled with the over the counter medication. 8 Tab 0     No current facility-administered medications on file prior to visit. Allergies   Allergen Reactions    Amoxicillin Rash    Codeine Other (comments)     \"mean\"       Past Medical History:   Diagnosis Date    Anxiety and depression     Asthma     Hidradenitis suppurativa     Menorrhagia     Migraine     Tobacco abuse     Started around age 15 years     History   Smoking Status    Current Every Day Smoker    Packs/day: 0.25    Types: Cigarettes    Start date: 1/1/2009   Smokeless Tobacco    Never Used     History   Alcohol Use    1.2 oz/week    0 Standard drinks or equivalent, 2 Shots of liquor per week     Comment: rarely     Family History   Problem Relation Age of Onset    Hypertension Mother     Other Mother      anxiety, fibromyalgia    Cancer Paternal Grandmother 54     ovarian     Review of Systems   Constitutional: Negative for diaphoresis, fever, malaise/fatigue and weight loss. Eyes: Negative for blurred vision. Cardiovascular: Negative for chest pain and palpitations. Gastrointestinal: Negative for abdominal pain, diarrhea, nausea and vomiting. Musculoskeletal: Negative for myalgias. Neurological: Negative for dizziness, tremors and headaches. Endo/Heme/Allergies: Does not bruise/bleed easily. Psychiatric/Behavioral: Positive for depression. Negative for hallucinations, substance abuse and suicidal ideas. The patient is nervous/anxious. The patient does not have insomnia. Visit Vitals    BP (!) 149/95 (BP 1 Location: Right arm, BP Patient Position: Sitting)    Pulse 92    Temp 97.5 °F (36.4 °C) (Oral)    Resp 18    Ht 5' 4\" (1.626 m)    Wt 259 lb (117.5 kg)    LMP 04/12/2018 (Approximate)    SpO2 99%    BMI 44.46 kg/m2     Physical Exam   Constitutional: She is oriented to person, place, and time. She appears well-developed and well-nourished. No distress.    obese   Neck: Neck supple. No thyromegaly present. Cardiovascular: Normal rate, regular rhythm and normal heart sounds. Pulmonary/Chest: Effort normal and breath sounds normal. No respiratory distress. She has no wheezes. She has no rales. Neurological: She is alert and oriented to person, place, and time. Psychiatric: She has a normal mood and affect. Her speech is normal and behavior is normal. Thought content normal. She expresses no homicidal and no suicidal ideation. Nursing note and vitals reviewed. ASSESSMENT and PLAN    ICD-10-CM ICD-9-CM    1. Recurrent major depressive disorder, remission status unspecified ([facility]) F33.9 296.30 FLUoxetine (PROZAC) 20 mg capsule   2. Mood swing ([facility]) F39 296.99 FLUoxetine (PROZAC) 20 mg capsule   3. Anxious mood F41.9 300.00 FLUoxetine (PROZAC) 20 mg capsule   4. Binge eating disorder F50.81 307.50 FLUoxetine (PROZAC) 20 mg capsule   5. Class 3 severe obesity due to excess calories without serious comorbidity with body mass index (BMI) of 40.0 to 44.9 in adult ([facility]) E66.01 278.01     Z68.41 V85.41       Will start prozac 20mg daily for both depressive and anxious symptoms, and eating disorder. Since she does not have medical insurance, she will call the office in 4-6 weeks and let me know how she is doing with the medication. Follow up in the office in 3 months. She will start walking with her mom and sister. If prozac does not help with eating disorder, once on optimal dose, then we discussed the need for therapy. Reviewed medication and side effects. Patient agrees with the plan and verbalizes understanding. Follow-up Disposition:  Return in about 3 months (around 8/9/2018) for depression and anxiety. Layne Laura PA-C  5/9/2018    Discussed the patient's BMI with her.   The BMI follow up plan is as follows:     dietary management education, guidance, and counseling  encourage exercise  monitor weight    An After Visit Summary was printed and given to the patient. Clothing

## 2018-05-09 NOTE — PATIENT INSTRUCTIONS
Recovering From Depression: Care Instructions  Your Care Instructions    Taking good care of yourself is important as you recover from depression. In time, your symptoms will fade as your treatment takes hold. Do not give up. Instead, focus your energy on getting better. Your mood will improve. It just takes some time. Focus on things that can help you feel better, such as being with friends and family, eating well, and getting enough rest. But take things slowly. Do not do too much too soon. You will begin to feel better gradually. Follow-up care is a key part of your treatment and safety. Be sure to make and go to all appointments, and call your doctor if you are having problems. It's also a good idea to know your test results and keep a list of the medicines you take. How can you care for yourself at home? Be realistic  · If you have a large task to do, break it up into smaller steps you can handle, and just do what you can. · You may want to put off important decisions until your depression has lifted. If you have plans that will have a major impact on your life, such as marriage, divorce, or a job change, try to wait a bit. Talk it over with friends and loved ones who can help you look at the overall picture first.  · Reaching out to people for help is important. Do not isolate yourself. Let your family and friends help you. Find someone you can trust and confide in, and talk to that person. · Be patient, and be kind to yourself. Remember that depression is not your fault and is not something you can overcome with willpower alone. Treatment is necessary for depression, just like for any other illness. Feeling better takes time, and your mood will improve little by little. Stay active  · Stay busy and get outside. Take a walk, or try some other light exercise. · Talk with your doctor about an exercise program. Exercise can help with mild depression. · Go to a movie or concert.  Take part in a [employer] activity or other social gathering. Go to a [employer] game. · Ask a friend to have dinner with you. Take care of yourself  · Eat a balanced diet with plenty of fresh fruits and vegetables, whole grains, and lean protein. If you have lost your appetite, eat small snacks rather than large meals. · Avoid drinking alcohol or using illegal drugs. Do not take medicines that have not been prescribed for you. They may interfere with medicines you may be taking for depression, or they may make your depression worse. · Take your medicines exactly as they are prescribed. You may start to feel better within 1 to 3 weeks of taking antidepressant medicine. But it can take as many as 6 to 8 weeks to see more improvement. If you have questions or concerns about your medicines, or if you do not notice any improvement by 3 weeks, talk to your doctor. · If you have any side effects from your medicine, tell your doctor. Antidepressants can make you feel tired, dizzy, or nervous. Some people have dry mouth, constipation, headaches, sexual problems, or diarrhea. Many of these side effects are mild and will go away on their own after you have been taking the medicine for a few weeks. Some may last longer. Talk to your doctor if side effects are bothering you too much. You might be able to try a different medicine. · Get enough sleep. If you have problems sleeping:  ¨ Go to bed at the same time every night, and get up at the same time every morning. ¨ Keep your bedroom dark and quiet. ¨ Do not exercise after 5:00 p.m. ¨ Avoid drinks with caffeine after 5:00 p.m. · Avoid sleeping pills unless they are prescribed by the doctor treating your depression. Sleeping pills may make you groggy during the day, and they may interact with other medicine you are taking. · If you have any other illnesses, such as diabetes, heart disease, or high blood pressure, make sure to continue with your treatment.  Tell your doctor about all of the medicines you take, including those with or without a prescription. · Keep the numbers for these national suicide hotlines: 3-730-045-TALK (8-566.359.5813) and 7-480-EVUWAIE (0-832.415.8132). If you or someone you know talks about suicide or feeling hopeless, get help right away. When should you call for help? Call 911 anytime you think you may need emergency care. For example, call if:  ? · You feel like hurting yourself or someone else. ? · Someone you know has depression and is about to attempt or is attempting suicide. ?Call your doctor now or seek immediate medical care if:  ? · You hear voices. ? · Someone you know has depression and:  ¨ Starts to give away his or her possessions. ¨ Uses illegal drugs or drinks alcohol heavily. ¨ Talks or writes about death, including writing suicide notes or talking about guns, knives, or pills. ¨ Starts to spend a lot of time alone. ¨ Acts very aggressively or suddenly appears calm. ? Watch closely for changes in your health, and be sure to contact your doctor if:  ? · You do not get better as expected. Where can you learn more? Go to http://taviaSnapUpdontrell.info/. Enter W316 in the search box to learn more about \"Recovering From Depression: Care Instructions. \"  Current as of: May 12, 2017  Content Version: 11.4  © 7173-0984 [employer]. Care instructions adapted under license by [employer] (which disclaims liability or warranty for this information). If you have questions about a medical condition or this instruction, always ask your healthcare professional. [address] any warranty or liability for your use of this information. Body Mass Index: Care Instructions  Your Care Instructions    Body mass index (BMI) can help you see if your weight is raising your risk for health problems. It uses a formula to compare how much you weigh with how tall you are.   · A BMI lower than 18.5 is considered underweight. · A BMI between 18.5 and 24.9 is considered healthy. · A BMI between 25 and 29.9 is considered overweight. A BMI of 30 or higher is considered obese. If your BMI is in the normal range, it means that you have a lower risk for weight-related health problems. If your BMI is in the overweight or obese range, you may be at increased risk for weight-related health problems, such as high blood pressure, heart disease, stroke, arthritis or joint pain, and diabetes. If your BMI is in the underweight range, you may be at increased risk for health problems such as fatigue, lower protection (immunity) against illness, muscle loss, bone loss, hair loss, and hormone problems. BMI is just one measure of your risk for weight-related health problems. You may be at higher risk for health problems if you are not active, you eat an unhealthy diet, or you drink too much alcohol or use tobacco products. Follow-up care is a key part of your treatment and safety. Be sure to make and go to all appointments, and call your doctor if you are having problems. It's also a good idea to know your test results and keep a list of the medicines you take. How can you care for yourself at home? · Practice healthy eating habits. This includes eating plenty of fruits, vegetables, whole grains, lean protein, and low-fat dairy. · If your doctor recommends it, get more exercise. Walking is a good choice. Bit by bit, increase the amount you walk every day. Try for at least 30 minutes on most days of the week. · Do not smoke. Smoking can increase your risk for health problems. If you need help quitting, talk to your doctor about stop-smoking programs and medicines. These can increase your chances of quitting for good. · Limit alcohol to 2 drinks a day for men and 1 drink a day for women. Too much alcohol can cause health problems.   If you have a BMI higher than 25  · Your doctor may do other tests to check your risk for weight-related health problems. This may include measuring the distance around your waist. A waist measurement of more than 40 inches in men or 35 inches in women can increase the risk of weight-related health problems. · Talk with your doctor about steps you can take to stay healthy or improve your health. You may need to make lifestyle changes to lose weight and stay healthy, such as changing your diet and getting regular exercise. If you have a BMI lower than 18.5  · Your doctor may do other tests to check your risk for health problems. · Talk with your doctor about steps you can take to stay healthy or improve your health. You may need to make lifestyle changes to gain or maintain weight and stay healthy, such as getting more healthy foods in your diet and doing exercises to build muscle. Where can you learn more? Go to http://tavia-dontrell.info/. Enter S176 in the search box to learn more about \"Body Mass Index: Care Instructions. \"  Current as of: October 13, 2016  Content Version: 11.4  © 8541-7067 [employer]. Care instructions adapted under license by [employer] (which disclaims liability or warranty for this information). If you have questions about a medical condition or this instruction, always ask your healthcare professional. [address] any warranty or liability for your use of this information.

## 2018-06-04 ENCOUNTER — TELEPHONE (OUTPATIENT)
Dept: FAMILY MEDICINE CLINIC | Age: 23
End: 2018-06-04

## 2018-06-04 DIAGNOSIS — R45.86 MOOD SWING: ICD-10-CM

## 2018-06-04 DIAGNOSIS — F50.81 BINGE EATING DISORDER: ICD-10-CM

## 2018-06-04 DIAGNOSIS — F41.9 ANXIOUS MOOD: ICD-10-CM

## 2018-06-04 DIAGNOSIS — F33.9 RECURRENT MAJOR DEPRESSIVE DISORDER, REMISSION STATUS UNSPECIFIED (HCC): ICD-10-CM

## 2018-06-06 NOTE — TELEPHONE ENCOUNTER
How is she tolerating the medication? Any side effects? Improvement in symptoms? I would like to hold off on increasing the dose until she has been taking the medication for 6 weeks.

## 2018-07-16 ENCOUNTER — HOSPITAL ENCOUNTER (EMERGENCY)
Age: 23
Discharge: HOME | End: 2018-07-16
Attending: EMERGENCY MEDICINE
Payer: SELF-PAY

## 2018-07-16 VITALS
TEMPERATURE: 98.5 F | RESPIRATION RATE: 18 BRPM | SYSTOLIC BLOOD PRESSURE: 133 MMHG | WEIGHT: 260 LBS | HEART RATE: 90 BPM | OXYGEN SATURATION: 99 % | BODY MASS INDEX: 44.39 KG/M2 | HEIGHT: 64 IN | DIASTOLIC BLOOD PRESSURE: 81 MMHG

## 2018-07-16 DIAGNOSIS — L02.91 ABSCESS: Primary | ICD-10-CM

## 2018-07-16 PROCEDURE — 74011250637 HC RX REV CODE- 250/637: Performed by: PHYSICIAN ASSISTANT

## 2018-07-16 PROCEDURE — 75810000289 HC I&D ABSCESS SIMP/COMP/MULT

## 2018-07-16 PROCEDURE — 99284 EMERGENCY DEPT VISIT MOD MDM: CPT

## 2018-07-16 RX ORDER — CLINDAMYCIN HYDROCHLORIDE 150 MG/1
300 CAPSULE ORAL
Status: COMPLETED | OUTPATIENT
Start: 2018-07-16 | End: 2018-07-16

## 2018-07-16 RX ORDER — CLINDAMYCIN HYDROCHLORIDE 300 MG/1
300 CAPSULE ORAL 2 TIMES DAILY
Qty: 14 CAP | Refills: 0 | Status: SHIPPED | OUTPATIENT
Start: 2018-07-16 | End: 2018-07-23

## 2018-07-16 RX ORDER — TRAMADOL HYDROCHLORIDE 50 MG/1
50 TABLET ORAL
Qty: 8 TAB | Refills: 0 | Status: SHIPPED | OUTPATIENT
Start: 2018-07-16 | End: 2019-06-17

## 2018-07-16 RX ORDER — TRAMADOL HYDROCHLORIDE 50 MG/1
50 TABLET ORAL
Status: COMPLETED | OUTPATIENT
Start: 2018-07-16 | End: 2018-07-16

## 2018-07-16 RX ADMIN — CLINDAMYCIN HYDROCHLORIDE 300 MG: 150 CAPSULE ORAL at 18:20

## 2018-07-16 RX ADMIN — TRAMADOL HYDROCHLORIDE 50 MG: 50 TABLET, FILM COATED ORAL at 18:19

## 2018-07-16 NOTE — DISCHARGE INSTRUCTIONS
Skin Abscess: Care Instructions  Your Care Instructions    A skin abscess is a bacterial infection that forms a pocket of pus. A boil is a kind of skin abscess. The doctor may have cut an opening in the abscess so that the pus can drain out. You may have gauze in the cut so that the abscess will stay open and keep draining. You may need antibiotics. You will need to follow up with your doctor to make sure the infection has gone away. The doctor has checked you carefully, but problems can develop later. If you notice any problems or new symptoms, get medical treatment right away. Follow-up care is a key part of your treatment and safety. Be sure to make and go to all appointments, and call your doctor if you are having problems. It's also a good idea to know your test results and keep a list of the medicines you take. How can you care for yourself at home? · Apply warm and dry compresses, a heating pad set on low, or a hot water bottle 3 or 4 times a day for pain. Keep a cloth between the heat source and your skin. · If your doctor prescribed antibiotics, take them as directed. Do not stop taking them just because you feel better. You need to take the full course of antibiotics. · Take pain medicines exactly as directed. ¨ If the doctor gave you a prescription medicine for pain, take it as prescribed. ¨ If you are not taking a prescription pain medicine, ask your doctor if you can take an over-the-counter medicine. · Keep your bandage clean and dry. Change the bandage whenever it gets wet or dirty, or at least one time a day. · If the abscess was packed with gauze:  ¨ Keep follow-up appointments to have the gauze changed or removed. If the doctor instructed you to remove the gauze, gently pull out all of the gauze when your doctor tells you to. ¨ After the gauze is removed, soak the area in warm water for 15 to 20 minutes 2 times a day, until the wound closes. When should you call for help?   Call your doctor now or seek immediate medical care if:    · You have signs of worsening infection, such as:  ¨ Increased pain, swelling, warmth, or redness. ¨ Red streaks leading from the infected skin. ¨ Pus draining from the wound. ¨ A fever.    Watch closely for changes in your health, and be sure to contact your doctor if:    · You do not get better as expected. Where can you learn more? Go to http://tavia-dontrell.info/. Enter X612 in the search box to learn more about \"Skin Abscess: Care Instructions. \"  Current as of: May 10, 2017  Content Version: 11.7  © 2784-3363 [employer]. Care instructions adapted under license by [employer] (which disclaims liability or warranty for this information). If you have questions about a medical condition or this instruction, always ask your healthcare professional. [address] any warranty or liability for your use of this information. [employer] Activation    Thank you for requesting access to [employer]. Please follow the instructions below to securely access and download your online medical record. [employer] allows you to send messages to your doctor, view your test results, renew your prescriptions, schedule appointments, and more. How Do I Sign Up? 1. In your internet browser, go to [email or web address]  2. Click on the First Time User? Click Here link in the Sign In box. You will be redirect to the New Member Sign Up page. 3. Enter your [employer] Access Code exactly as it appears below. You will not need to use this code after youve completed the sign-up process. If you do not sign up before the expiration date, you must request a new code. [employer] Access Code: [email or web address]  Expires: 2018 11:38 AM (This is the date your [employer] access code will )    4. Enter the last four digits of your Social Security Number (xxxx) and Date of Birth (mm/dd/yyyy) as indicated and click Submit.  You will be taken to the next sign-up page. 5. Create a RelateIQt ID. This will be your [employer] login ID and cannot be changed, so think of one that is secure and easy to remember. 6. Create a [employer] password. You can change your password at any time. 7. Enter your Password Reset Question and Answer. This can be used at a later time if you forget your password. 8. Enter your e-mail address. You will receive e-mail notification when new information is available in [address]. 9. Click Sign Up. You can now view and download portions of your medical record. 10. Click the Download Summary menu link to download a portable copy of your medical information. Additional Information    If you have questions, please visit the Frequently Asked Questions section of the [employer] website at [email or web address] Remember, [employer] is NOT to be used for urgent needs. For medical emergencies, dial 911.

## 2018-07-16 NOTE — ED PROVIDER NOTES
EMERGENCY DEPARTMENT HISTORY AND PHYSICAL EXAM    6:10 PM      Date: 7/16/2018  Patient Name: Dean Monroe    History of Presenting Illness     Chief Complaint   Patient presents with    Abscess         History Provided By: Patient    Chief Complaint: Abscess  Duration:  3 days  Timing:  Constant  Location: right abdominal wall  Quality: N/a  Severity: 9/10  Modifying Factors: Motrin takes the edge off of the pain  Associated Symptoms: denies any other associated signs or symptoms      Additional History (Context): Dean Monroe is a 25 y.o. female with hx of hidradenitis who presents with constant right abdominal wall abscess for the past 3 days. Her pain is a 9/10 and taking motrin provides some relief from the pain. Denies fever/chills, drainage. No other concerns, modifying factors, or symptoms were expressed by the pt at this time. PCP: Kim Shah PA-C    Current Outpatient Prescriptions   Medication Sig Dispense Refill    traMADol (ULTRAM) 50 mg tablet Take 1 Tab by mouth every six (6) hours as needed for Pain. Max Daily Amount: 200 mg. 8 Tab 0    clindamycin (CLEOCIN) 300 mg capsule Take 1 Cap by mouth two (2) times a day for 7 days. 14 Cap 0    FLUoxetine (PROZAC) 20 mg capsule Take 1 Cap by mouth daily. 30 Cap 3    loratadine (CLARITIN) 10 mg tablet Take 1 Tab by mouth daily. 100 Tab 0    fluticasone (FLONASE) 50 mcg/actuation nasal spray 2 Sprays by Both Nostrils route daily. 1 Bottle 0    albuterol (PROVENTIL HFA, VENTOLIN HFA, PROAIR HFA) 90 mcg/actuation inhaler Take 2 Puffs by inhalation every four (4) hours as needed for Wheezing.  1 Inhaler 3       Past History     Past Medical History:  Past Medical History:   Diagnosis Date    Anxiety and depression     Asthma     Hidradenitis suppurativa     Menorrhagia     Migraine     Tobacco abuse     Started around age 15 years       Past Surgical History:  Past Surgical History:   Procedure Laterality Date    DRAIN PILONIDAL CYST COMPLIC  4-99-17    Dr. Gabriel Chirinos HX OTHER SURGICAL      excision pilonidal cyst    REMV PILONIDAL LESION EXTENS N/A 10/21/2016    Dr. Patti Cruz       Family History:  Family History   Problem Relation Age of Onset    Hypertension Mother     Other Mother      anxiety, fibromyalgia    Cancer Paternal Grandmother 54     ovarian       Social History:  Social History   Substance Use Topics    Smoking status: Current Every Day Smoker     Packs/day: 0.25     Types: Cigarettes     Start date: 1/1/2009    Smokeless tobacco: Never Used    Alcohol use 1.2 oz/week     0 Standard drinks or equivalent, 2 Shots of liquor per week      Comment: rarely       Allergies: Allergies   Allergen Reactions    Amoxicillin Rash    Codeine Other (comments)     \"mean\"           Review of Systems     Review of Systems   Constitutional: Negative for chills and fever. Respiratory: Negative for shortness of breath. Cardiovascular: Negative for chest pain. Gastrointestinal: Negative for abdominal pain, nausea and vomiting. Skin: Positive for wound (below right breast). Negative for rash. Neurological: Negative for weakness. All other systems reviewed and are negative. Physical Exam     Visit Vitals    /81 (BP 1 Location: Left arm, BP Patient Position: At rest;Sitting)    Pulse (!) 114    Temp 98.5 °F (36.9 °C)    Resp 18    Ht 5' 4\" (1.626 m)    Wt 117.9 kg (260 lb)    SpO2 99%    BMI 44.63 kg/m2         Physical Exam   Constitutional: She appears well-developed and well-nourished. No distress. HENT:   Head: Normocephalic and atraumatic. Neck: Normal range of motion. Neck supple. Cardiovascular: Normal rate, regular rhythm and normal heart sounds. Exam reveals no gallop and no friction rub. No murmur heard. Pulmonary/Chest: Effort normal and breath sounds normal. No respiratory distress. She has no wheezes. She has no rales.    3 cm region of fluctuance below right breast no extension into breast region or drainage. Chronic scarring between breasts and below left breast.    Neurological: She is alert. Skin: Skin is warm. No rash noted. She is not diaphoretic. Nursing note and vitals reviewed. Diagnostic Study Results     Labs -  No results found for this or any previous visit (from the past 12 hour(s)). Radiologic Studies -   No orders to display         Medical Decision Making   I am the first provider for this patient. I reviewed the vital signs, available nursing notes, past medical history, past surgical history, family history and social history. Vital Signs-Reviewed the patient's vital signs. Records Reviewed: Nursing Notes and Old Medical Records (Time of Review: 6:10 PM)    ED Course: Progress Notes, Reevaluation, and Consults:  6:11 Progress note: Verbal and written consent obtained for I&D. Initial tachycardia likely 2/2 pain and anxiety about procedure. Improved post-procedure   6:46 PM Reviewed plan with patient. Discussed need for close outpatient follow-up. Discussed strict return precautions, including  Fever, increased swelling/drainage, or any other medical concerns. I&D Abcess Complex  Date/Time: 7/16/2018 6:15 PM  Performed by: Zaida Bansal  Authorized by: Andria Oconnell     Consent:     Consent obtained:  Verbal and written    Consent given by:  Patient    Risks discussed:  Bleeding, incomplete drainage and pain    Alternatives discussed:  Delayed treatment  Universal protocol:     Procedure explained and questions answered to patient or proxy's satisfaction: yes      Relevant documents present and verified: yes      Patient identity confirmed:  Verbally with patient  Location:     Type:  Abscess    Size:  3 cm     Location:  Trunk    Trunk location:  Abdomen  Pre-procedure details:     Skin preparation:  Betadine  Anesthesia (see MAR for exact dosages):      Anesthesia method:  Local infiltration    Local anesthetic:  Lidocaine 1% w/o epi  Procedure type:     Complexity:  Complex  Procedure details:     Needle aspiration: no      Incision types:  Single straight    Incision depth:  Subcutaneous    Scalpel blade:  11    Wound management:  Probed and deloculated and irrigated with saline    Drainage:  Purulent    Drainage amount: Moderate    Wound treatment:  Wound left open    Packing materials:  None  Post-procedure details:     Patient tolerance of procedure: Tolerated well, no immediate complications        Provider Notes (Medical Decision Making): 26 yo F with hx of hidradenitis who presents due to R abdominal wall abscess. I+D performed with moderate purulent drainage. Afebrile, non-toxic appearing. No involvement of breast region. Will place on abx due to hx of hidradenitis and mild surrounding erythema. Stable for d/c with outpatient follow-up. Diagnosis     Clinical Impression:   1. Abscess        Disposition: Discharged    Follow-up Information     Follow up With Details Comments Contact Info    [facility] EMERGENCY DEPT  If symptoms worsen [address], [place] In 2 days  [address] [facility] [address] [phone]             Patient's Medications   Start Taking    CLINDAMYCIN (CLEOCIN) 300 MG CAPSULE    Take 1 Cap by mouth two (2) times a day for 7 days. TRAMADOL (ULTRAM) 50 MG TABLET    Take 1 Tab by mouth every six (6) hours as needed for Pain. Max Daily Amount: 200 mg. Continue Taking    ALBUTEROL (PROVENTIL HFA, VENTOLIN HFA, PROAIR HFA) 90 MCG/ACTUATION INHALER    Take 2 Puffs by inhalation every four (4) hours as needed for Wheezing. FLUOXETINE (PROZAC) 20 MG CAPSULE    Take 1 Cap by mouth daily. FLUTICASONE (FLONASE) 50 MCG/ACTUATION NASAL SPRAY    2 Sprays by Both Nostrils route daily. LORATADINE (CLARITIN) 10 MG TABLET    Take 1 Tab by mouth daily.    These Medications have changed    No medications on file Stop Taking    No medications on file     _______________________________    Attestations:  Scribe Attestation  Juan David Covington acting as a scribe for and in the presence of Katharine Ovalle    July 16, 2018 at 6:14 PM      Provider Attestation:      I personally performed the services described in the documentation, reviewed the documentation, as recorded by the scribe in my presence, and it accurately and completely records my words and actions.  July 16, 2018 at 6:14 PM  - ABDOUL Ovalle   _______________________________

## 2018-08-10 RX ORDER — FLUOXETINE HYDROCHLORIDE 20 MG/1
20 CAPSULE ORAL DAILY
Qty: 30 CAP | Refills: 3 | OUTPATIENT
Start: 2018-08-10

## 2018-10-09 ENCOUNTER — HOSPITAL ENCOUNTER (EMERGENCY)
Age: 23
Discharge: HOME | End: 2018-10-09
Attending: EMERGENCY MEDICINE
Payer: SELF-PAY

## 2018-10-09 VITALS
SYSTOLIC BLOOD PRESSURE: 144 MMHG | BODY MASS INDEX: 46.07 KG/M2 | RESPIRATION RATE: 16 BRPM | HEIGHT: 63 IN | TEMPERATURE: 98.2 F | OXYGEN SATURATION: 99 % | HEART RATE: 84 BPM | DIASTOLIC BLOOD PRESSURE: 92 MMHG | WEIGHT: 260 LBS

## 2018-10-09 DIAGNOSIS — Z48.89 ENCOUNTER FOR POST SURGICAL WOUND CHECK: Primary | ICD-10-CM

## 2018-10-09 PROCEDURE — 99282 EMERGENCY DEPT VISIT SF MDM: CPT

## 2018-10-09 PROCEDURE — 87070 CULTURE OTHR SPECIMN AEROBIC: CPT | Performed by: EMERGENCY MEDICINE

## 2018-10-09 PROCEDURE — 87186 SC STD MICRODIL/AGAR DIL: CPT | Performed by: EMERGENCY MEDICINE

## 2018-10-09 PROCEDURE — 87077 CULTURE AEROBIC IDENTIFY: CPT | Performed by: EMERGENCY MEDICINE

## 2018-10-09 RX ORDER — DOXYCYCLINE 100 MG/1
100 CAPSULE ORAL 2 TIMES DAILY
Qty: 20 CAP | Refills: 0 | Status: SHIPPED | OUTPATIENT
Start: 2018-10-09 | End: 2018-10-09

## 2018-10-09 RX ORDER — DOXYCYCLINE 100 MG/1
100 CAPSULE ORAL 2 TIMES DAILY
Qty: 28 CAP | Refills: 0 | Status: SHIPPED | OUTPATIENT
Start: 2018-10-09 | End: 2018-10-23

## 2018-10-09 RX ORDER — ONDANSETRON HYDROCHLORIDE 8 MG/1
8 TABLET, FILM COATED ORAL
Qty: 20 TAB | Refills: 0 | Status: SHIPPED | OUTPATIENT
Start: 2018-10-09 | End: 2018-10-09

## 2018-10-09 RX ORDER — ONDANSETRON HYDROCHLORIDE 8 MG/1
8 TABLET, FILM COATED ORAL
Qty: 28 TAB | Refills: 0 | Status: SHIPPED | OUTPATIENT
Start: 2018-10-09 | End: 2018-10-23

## 2018-10-09 NOTE — ED NOTES
Discharge instructions reviewed with patient, who verbalized understanding. Patient stable, and in no obvious distress at time of discharge.

## 2018-10-09 NOTE — ED TRIAGE NOTES
\"I had back surgery over a year ago. The incision is opening up. I noticed some blood coming out of it last night. \"

## 2018-10-09 NOTE — ED PROVIDER NOTES
EMERGENCY DEPARTMENT HISTORY AND PHYSICAL EXAM    Date: 10/9/2018  Patient Name: Lady Kelly    History of Presenting Illness     Chief Complaint   Patient presents with    Buttocks pain         History Provided By: Patient    Chief Complaint: wound dehiscence  Duration: 1 Days  Timing:  Acute  Location: buttocks  Quality: Sharp  Severity: Mild  Modifying Factors: surgical debridement of pilonidal 2016 w/judd  Associated Symptoms: mild drainage      Additional History (Context): Lady Kelly is a 25 y.o. female with obesity and hidradenitis suppurativa, pilonidal abscess excision 2016, depression who presents with wound dehiscence early this morning/late last night. Never did follow up w/Judd due to a few life issues; scant drainage from wound site. Mild discomfort to area. Denies fever. PCP: Brenda Tejeda PA-C    Current Outpatient Prescriptions   Medication Sig Dispense Refill    doxycycline (MONODOX) 100 mg capsule Take 1 Cap by mouth two (2) times a day for 14 days. 28 Cap 0    ondansetron hcl (ZOFRAN) 8 mg tablet Take 1 Tab by mouth every eight (8) hours as needed for Nausea for up to 14 days. 28 Tab 0    traMADol (ULTRAM) 50 mg tablet Take 1 Tab by mouth every six (6) hours as needed for Pain. Max Daily Amount: 200 mg. 8 Tab 0    FLUoxetine (PROZAC) 20 mg capsule Take 1 Cap by mouth daily. 30 Cap 3    loratadine (CLARITIN) 10 mg tablet Take 1 Tab by mouth daily. 100 Tab 0    fluticasone (FLONASE) 50 mcg/actuation nasal spray 2 Sprays by Both Nostrils route daily. 1 Bottle 0    albuterol (PROVENTIL HFA, VENTOLIN HFA, PROAIR HFA) 90 mcg/actuation inhaler Take 2 Puffs by inhalation every four (4) hours as needed for Wheezing.  1 Inhaler 3       Past History     Past Medical History:  Past Medical History:   Diagnosis Date    Anxiety and depression     Asthma     Hidradenitis suppurativa     Menorrhagia     Migraine     Tobacco abuse     Started around age 15 years       Past Surgical History:  Past Surgical History:   Procedure Laterality Date    DRAIN PILONIDAL CYST COMPLIC  3-38-65    Dr. Mathur Eye HX OTHER SURGICAL      excision pilonidal cyst    REMV PILONIDAL LESION EXTENS N/A 10/21/2016    Dr. Peng Maral       Family History:  Family History   Problem Relation Age of Onset    Hypertension Mother     Other Mother      anxiety, fibromyalgia    Cancer Paternal Grandmother 54     ovarian       Social History:  Social History   Substance Use Topics    Smoking status: Current Every Day Smoker     Packs/day: 0.25     Types: Cigarettes     Start date: 1/1/2009    Smokeless tobacco: Never Used    Alcohol use 1.2 oz/week     0 Standard drinks or equivalent, 2 Shots of liquor per week      Comment: rarely       Allergies: Allergies   Allergen Reactions    Amoxicillin Rash    Codeine Other (comments)     \"mean\"           Review of Systems   Review of Systems   Skin: Positive for wound. All other systems reviewed and are negative. All Other Systems Negative  Physical Exam     Vitals:    10/09/18 1448   BP: (!) 169/96   Pulse: 100   Resp: 14   Temp: 98.9 °F (37.2 °C)   SpO2: 100%   Weight: 117.9 kg (260 lb)   Height: 5' 3\" (1.6 m)     Physical Exam   Constitutional: She is oriented to person, place, and time. She appears well-developed. HENT:   Head: Normocephalic and atraumatic. Eyes: Pupils are equal, round, and reactive to light. Neck: No JVD present. No tracheal deviation present. No thyromegaly present. Cardiovascular: Normal rate, regular rhythm and normal heart sounds. Exam reveals no gallop and no friction rub. No murmur heard. Pulmonary/Chest: Effort normal and breath sounds normal. No stridor. No respiratory distress. She has no wheezes. She has no rales. She exhibits no tenderness. Abdominal: Soft. She exhibits no distension and no mass. There is no tenderness. There is no rebound and no guarding. Genitourinary:   Genitourinary Comments:  At the gluteal cleft's superior most aspect there is a small dehiscence of the surgical wound; the tear itself is tender and measures approx 4mm in length. No deep fluctuance or definite palpable difference detected compared to area just inferior to the skin in question. Musculoskeletal: She exhibits no edema or tenderness. Lymphadenopathy:     She has no cervical adenopathy. Neurological: She is alert and oriented to person, place, and time. Skin: Skin is warm and dry. No rash noted. No erythema. No pallor. Psychiatric: She has a normal mood and affect. Her behavior is normal. Thought content normal.   Nursing note and vitals reviewed. Diagnostic Study Results     Labs -   No results found for this or any previous visit (from the past 12 hour(s)). Radiologic Studies -   No orders to display     CT Results  (Last 48 hours)    None        CXR Results  (Last 48 hours)    None            Medical Decision Making   I am the first provider for this patient. I reviewed the vital signs, available nursing notes, past medical history, past surgical history, family history and social history. Vital Signs-Reviewed the patient's vital signs. Records Reviewed: Nursing Notes    Procedures:  Procedures    Provider Notes (Medical Decision Making): treat w/doxycycline. Pt has had two debridements of the abscess; spoke with judd who was in the [facility] OR. Wants her on two weeks of doxycycline and to f/up with him in his office in one week. MED RECONCILIATION:  No current facility-administered medications for this encounter. Current Outpatient Prescriptions   Medication Sig    doxycycline (MONODOX) 100 mg capsule Take 1 Cap by mouth two (2) times a day for 14 days.  ondansetron hcl (ZOFRAN) 8 mg tablet Take 1 Tab by mouth every eight (8) hours as needed for Nausea for up to 14 days.  traMADol (ULTRAM) 50 mg tablet Take 1 Tab by mouth every six (6) hours as needed for Pain. Max Daily Amount: 200 mg.  FLUoxetine (PROZAC) 20 mg capsule Take 1 Cap by mouth daily.  loratadine (CLARITIN) 10 mg tablet Take 1 Tab by mouth daily.  fluticasone (FLONASE) 50 mcg/actuation nasal spray 2 Sprays by Both Nostrils route daily.  albuterol (PROVENTIL HFA, VENTOLIN HFA, PROAIR HFA) 90 mcg/actuation inhaler Take 2 Puffs by inhalation every four (4) hours as needed for Wheezing. Disposition:  home    DISCHARGE NOTE:   4:31 PM    Pt has been reexamined. Patient has no new complaints, changes, or physical findings. Care plan outlined and precautions discussed. Results of exam, labs, d/c instructions were reviewed with the patient. All medications were reviewed with the patient; will d/c home with doxy, zofrn. All of pt's questions and concerns were addressed. Patient was instructed and agrees to follow up with judd, as well as to return to the ED upon further deterioration. Patient is ready to go home. Follow-up Information     Follow up With Details Comments [address], [place] Schedule an appointment as soon as possible for a visit in 1 day  [place] [address] [facility] EMERGENCY DEPT  If symptoms worsen return immediately [address]  [phone]          Current Discharge Medication List      START taking these medications    Details   doxycycline (MONODOX) 100 mg capsule Take 1 Cap by mouth two (2) times a day for 14 days. Qty: 28 Cap, Refills: 0      ondansetron hcl (ZOFRAN) 8 mg tablet Take 1 Tab by mouth every eight (8) hours as needed for Nausea for up to 14 days. Qty: 28 Tab, Refills: 0             Diagnosis     Clinical Impression:   1.  Encounter for post surgical wound check

## 2018-10-11 ENCOUNTER — OFFICE VISIT (OUTPATIENT)
Dept: FAMILY MEDICINE CLINIC | Age: 23
End: 2018-10-11

## 2018-10-11 ENCOUNTER — OFFICE VISIT (OUTPATIENT)
Dept: SURGERY | Age: 23
End: 2018-10-11

## 2018-10-11 VITALS
OXYGEN SATURATION: 100 % | SYSTOLIC BLOOD PRESSURE: 122 MMHG | HEIGHT: 63 IN | RESPIRATION RATE: 20 BRPM | TEMPERATURE: 98.9 F | BODY MASS INDEX: 47.31 KG/M2 | DIASTOLIC BLOOD PRESSURE: 80 MMHG | WEIGHT: 267 LBS | HEART RATE: 102 BPM

## 2018-10-11 VITALS
WEIGHT: 260 LBS | TEMPERATURE: 97.6 F | DIASTOLIC BLOOD PRESSURE: 81 MMHG | HEIGHT: 63 IN | HEART RATE: 82 BPM | BODY MASS INDEX: 46.07 KG/M2 | RESPIRATION RATE: 18 BRPM | SYSTOLIC BLOOD PRESSURE: 140 MMHG

## 2018-10-11 DIAGNOSIS — E66.01 CLASS 3 SEVERE OBESITY WITH BODY MASS INDEX (BMI) OF 45.0 TO 49.9 IN ADULT, UNSPECIFIED OBESITY TYPE, UNSPECIFIED WHETHER SERIOUS COMORBIDITY PRESENT (HCC): ICD-10-CM

## 2018-10-11 DIAGNOSIS — O16.1 ELEVATED BLOOD PRESSURE AFFECTING PREGNANCY IN FIRST TRIMESTER, ANTEPARTUM: ICD-10-CM

## 2018-10-11 DIAGNOSIS — F33.9 RECURRENT MAJOR DEPRESSIVE DISORDER, REMISSION STATUS UNSPECIFIED (HCC): ICD-10-CM

## 2018-10-11 DIAGNOSIS — L73.2 HIDRADENITIS SUPPURATIVA: Primary | ICD-10-CM

## 2018-10-11 DIAGNOSIS — L05.91 PILONIDAL CYST: Primary | ICD-10-CM

## 2018-10-11 RX ORDER — IBUPROFEN 200 MG
TABLET ORAL
COMMUNITY
End: 2019-06-17

## 2018-10-11 RX ORDER — IBUPROFEN 800 MG/1
800 TABLET ORAL
Qty: 60 TAB | Refills: 0 | Status: SHIPPED | OUTPATIENT
Start: 2018-10-11 | End: 2019-06-17

## 2018-10-11 NOTE — LETTER
10/11/2018 11:45 AM 
 
Patient:  Ursula Hunter YOB: 1995 Date of Visit: 10/11/2018 [place] Ramos [facility] [address] [employer] [address] [address] VIA In Basket Dear Babs Wolfe is seen in the office after recent ER visit for pilonidal disease. I excised a pilonidal cyst in 2016 and unfortunately it appears to have recurred. There is no area worthy of I&D that I can see at this point. I will have her continue her antibiotics and follow up in the office in 10 days. She may need an excision of a recurrent pilonidal cyst.  
 
Thank you very much for your referral of Ms. Anthony Del Valle. If you have questions, please do not hesitate to call me. I look forward to following Ms. Brianna Tatum along with you and will keep you updated as to her progress. Sincerely, Eve Tatum MD

## 2018-10-11 NOTE — PATIENT INSTRUCTIONS
Hidradenitis Suppurativa: Care Instructions  Your Care Instructions    Hidradenitis suppurativa (say \"qti-robl-au-NY-tus sup-marleni-uh-TY-vuh\") is a skin condition that causes lumps on the skin that look like pimples or boils. The lumps are usually painful and can break open and drain blood and bad-smelling pus. The condition can come and go for many years. Treatment for this condition may include antibiotics and other medicines. You may need surgery to remove the lumps. Home care includes wearing loose-fitting clothes and washing the area gently. You can help prevent lumps from coming back by staying at a healthy weight and not smoking. Doctors don't know exactly how this condition starts. But they do know that something irritates and inflames the hair follicles, causing them to swell and form lumps. This skin condition can't be spread from person to person (isn't contagious). Follow-up care is a key part of your treatment and safety. Be sure to make and go to all appointments, and call your doctor if you are having problems. It's also a good idea to know your test results and keep a list of the medicines you take. How can you care for yourself at home?  [facility] care    · Wash the area every day with mild soap. Use your hands rather than a washcloth or sponge when you wash that part of your body.     · Leave the affected areas uncovered when you can. If you have lumps that are draining, you can cover them with a bandage or other dressing. Put petroleum jelly (such as Vaseline) on the dressing to help keep it from sticking.     · Wear-loose fitting clothes that don't rub against the area. Avoid activities that cause skin to rub together.     · If you have pain, try a warm compress. Soak a towel or washcloth in warm water, wring it out, and place it on the affected skin for about 10 minutes. Medicines    · Be safe with medicines. Take your medicines exactly as prescribed.  Call your doctor if you think you are having a problem with your medicine. You will get more details on the specific medicines your doctor prescribes.     · If your doctor prescribed antibiotics, take them as directed. Do not stop taking them just because you feel better. You need to take the full course of antibiotics.    Lifestyle choices    · If you smoke, think about quitting. Smoking can make the condition worse. If you need help quitting, talk to your doctor about stop-smoking programs and medicines. These can increase your chances of quitting for good.     · Stay at a healthy weight, or lose weight, by eating healthy foods and being physically active. Being overweight could make this condition worse. When should you call for help? Call your doctor now or seek immediate medical care if:    · You have symptoms of infection, such as:  ¨ Increased pain, swelling, warmth, or redness. ¨ Red streaks leading from the area. ¨ Pus draining from the area. ¨ A fever.    Watch closely for changes in your health, and be sure to contact your doctor if:    · You do not get better as expected. Where can you learn more? Go to http://tavia-dontrell.info/. Enter R198 in the search box to learn more about \"Hidradenitis Suppurativa: Care Instructions. \"  Current as of: April 18, 2018  Content Version: 11.8  © 4080-1791 [employer]. Care instructions adapted under license by [employer] (which disclaims liability or warranty for this information). If you have questions about a medical condition or this instruction, always ask your healthcare professional. [place] [address] any warranty or liability for your use of this information.

## 2018-10-11 NOTE — PROGRESS NOTES
HISTORY OF PRESENT ILLNESS  Martha Bass is a 25 y.o. female. HPI  Martha Bass is a 25 y.o. female who presents to the office today for follow up. She was started on prozac at her last visit almost 6 months ago, but stopped taking the medication because she does not like how those meds make her feel. She has not made an appt with a therapist or psychiatrist. She is really having a problem with HS. She has not been able to see a dermatologist for this because most places want her to pay prior to being seen and she cannot afford this. She has lesions in her groin, lower abdomen and under both breasts. Some days she cannot go to work because of the pain of wearing tighter clothes. She will soak through her clothing. There is an odor which causes embarrassment. She does not want to be intimate with her boyfriend because of this. She just wants to stay in bed and sleep. She did see Dr. Dodie Mackey earlier today for a pilonidal cyst. She is currently taking doxy and will follow up with him in 10 days. BP was quite elevated initially, but she is upset today and in pain. Chief Complaint   Patient presents with    Follow-up     for skin issues    . Current Outpatient Prescriptions   Medication Sig    ibuprofen (MOTRIN IB) 200 mg tablet Take  by mouth.  ibuprofen (MOTRIN) 800 mg tablet Take 1 Tab by mouth every eight (8) hours as needed for Pain.  albuterol (PROVENTIL HFA, VENTOLIN HFA, PROAIR HFA) 90 mcg/actuation inhaler Take 2 Puffs by inhalation every four (4) hours as needed for Wheezing.  doxycycline (MONODOX) 100 mg capsule Take 1 Cap by mouth two (2) times a day for 14 days.  ondansetron hcl (ZOFRAN) 8 mg tablet Take 1 Tab by mouth every eight (8) hours as needed for Nausea for up to 14 days.  traMADol (ULTRAM) 50 mg tablet Take 1 Tab by mouth every six (6) hours as needed for Pain. Max Daily Amount: 200 mg.    FLUoxetine (PROZAC) 20 mg capsule Take 1 Cap by mouth daily.     loratadine (CLARITIN) 10 mg tablet Take 1 Tab by mouth daily.  fluticasone (FLONASE) 50 mcg/actuation nasal spray 2 Sprays by Both Nostrils route daily. No current facility-administered medications for this visit. Allergies   Allergen Reactions    Amoxicillin Rash    Codeine Other (comments)     \"mean\"       Past Medical History:   Diagnosis Date    Anxiety and depression     Asthma     Hidradenitis suppurativa     Menorrhagia     Migraine     Tobacco abuse     Started around age 15 years     History   Smoking Status    Current Every Day Smoker    Packs/day: 0.25    Types: Cigarettes    Start date: 1/1/2009   Smokeless Tobacco    Never Used     History   Alcohol Use    1.2 oz/week    0 Standard drinks or equivalent, 2 Shots of liquor per week     Comment: rarely     Family History   Problem Relation Age of Onset    Hypertension Mother     Other Mother      anxiety, fibromyalgia    Cancer Paternal Grandmother 54     ovarian     Review of Systems   Constitutional: Negative for chills, diaphoresis and fever. Respiratory: Negative for shortness of breath. Cardiovascular: Negative for chest pain and leg swelling. Skin:        HS   Neurological: Negative for dizziness. Psychiatric/Behavioral: Positive for depression. Negative for suicidal ideas. Visit Vitals    /80  Comment: recheck manual    Pulse (!) 102    Temp 98.9 °F (37.2 °C) (Oral)    Resp 20    Ht 5' 3\" (1.6 m)    Wt 267 lb (121.1 kg)    LMP 10/01/2018    SpO2 100%    BMI 47.3 kg/m2     Physical Exam   Constitutional: She is oriented to person, place, and time. She appears well-developed and well-nourished. No distress. Cardiovascular: Normal rate. Pulmonary/Chest: Effort normal. No respiratory distress. Neurological: She is alert and oriented to person, place, and time. Skin:        Hidradenitis suppurativa. Few draining under breasts, with odor.   One very painful lesion right lower abdomen; draining lesion on left lower abdomen   Psychiatric: She has a normal mood and affect. Her speech is normal and behavior is normal. Thought content normal.   Nursing note and vitals reviewed. ASSESSMENT and PLAN    ICD-10-CM ICD-9-CM    1. Hidradenitis suppurativa L73.2 705.83 ibuprofen (MOTRIN) 800 mg tablet   2. Recurrent major depressive disorder, remission status unspecified ([facility]) F33.9 296.30    3. Class 3 severe obesity with body mass index (BMI) of 45.0 to 49.9 in adult, unspecified obesity type, unspecified whether serious comorbidity present ([facility] E66.01 278.01     Z68.42 V85.42    4. Elevated blood pressure affecting pregnancy in first trimester, antepartum O16.1 642.33       She really needs to make an appt with dermatology. She does not have insurance, so this has been the biggest barrier. She and her mom are looking into options. This is affecting her personal life, relationship, job functions, and making her depression worse. Given home care instructions on HS. BP was initially elevated, but this came down nicely on repeat. We discussed the need for weight loss. I think this will be difficult for her at this time since she is not emotionally stable, and a lot of this has to do with her HS. I think once she can get into derm, things will start to turn around for her. Reviewed medication and side effects. Patient agrees with the plan and verbalizes understanding. Follow-up Disposition:  Return in about 3 months (around 1/11/2019) for HS, weight, depression.     Alireza Medina PA-C  10/11/2018

## 2018-10-11 NOTE — LETTER
NOTIFICATION RETURN TO WORK / SCHOOL 
 
10/11/2018 1:59 PM 
 
Ms. Maggie Whitley [address] [facility] [address] To Whom It May Concern: 
 
Maggie Whitley is currently under the care of Ped[facility] Mejia. She will return to work/school on: 10/14/2018 If there are questions or concerns please have the patient contact our office.  
 
 
 
Sincerely, 
 
 
Rhona Urbina PA-C

## 2018-10-11 NOTE — MR AVS SNAPSHOT
Jose De Jesus Nichole 
 
 
 [facility] [address] [address] 
[phone] Patient: Kinjal Vasquez MRN: [id number] :1995 Visit Information Date & Time Provider Department Dept. Phone Encounter #  
 10/11/2018  1:30 PM Marisel Morris PA-C [address] [phone] [id number] Your Appointments 10/22/2018 11:30 AM  
Follow Up with MD SUGAR Zhang [facility] ([address] [place] [address]) Appt Note: 10 day f/up [address] [id number] [address] \[facility] [place] [facility]\"" Street Upcoming Health Maintenance Date Due  
 HPV Age 9Y-34Y (1 of 1 - Female 3 Dose Series) 2006 Pneumococcal 19-64 Medium Risk (1 of 1 - PPSV23) 2014 PAP AKA CERVICAL CYTOLOGY 2016 Influenza Age 5 to Adult 2018 DTaP/Tdap/Td series (2 - Td) 2026 Allergies as of 10/11/2018  Review Complete On: 10/11/2018 By: Marisel Morris PA-C Severity Noted Reaction Type Reactions Amoxicillin  08/15/2014    Rash Codeine  2016    Other (comments) \"mean\" Current Immunizations  Never Reviewed Name Date Influenza Vaccine (Quad) PF 2016 Tdap 2016 12:54 PM  
  
 Not reviewed this visit You Were Diagnosed With   
  
 Codes Comments Hidradenitis suppurativa    -  Primary ICD-10-CM: L73.2 ICD-9-CM: 705.83 Recurrent major depressive disorder, remission status unspecified ([facility]     ICD-10-CM: F33.9 ICD-9-CM: 296.30 Class 3 severe obesity with body mass index (BMI) of 45.0 to 49.9 in adult, unspecified obesity type, unspecified whether serious comorbidity present (HCC)     ICD-10-CM: E66.01, Z68.42 
ICD-9-CM: 278.01, V85.42 Elevated blood pressure affecting pregnancy in first trimester, antepartum     ICD-10-CM: O16.1 ICD-9-CM: 642.33 Vitals BP Pulse Temp Resp Height(growth percentile) Weight(growth percentile) 122/80 (!) 102 98.9 °F (37.2 °C) (Oral) 20 5' 3\" (1.6 m) 267 lb (121.1 kg) LMP SpO2 BMI OB Status Smoking Status 10/01/2018 100% 47.3 kg/m2 Having regular periods Current Every Day Smoker Vitals History BMI and BSA Data Body Mass Index Body Surface Area  
 47.3 kg/m 2 2.32 m 2 Preferred Pharmacy Pharmacy Name Phone [facility] [address] [facility] [phone] Your Updated Medication List  
  
   
This list is accurate as of 10/11/18  2:04 PM.  Always use your most recent med list.  
  
  
  
  
 albuterol 90 mcg/actuation inhaler Commonly known as:  PROVENTIL HFA, VENTOLIN HFA, PROAIR HFA Take 2 Puffs by inhalation every four (4) hours as needed for Wheezing. doxycycline 100 mg capsule Commonly known as:  Noris Anes Take 1 Cap by mouth two (2) times a day for 14 days. FLUoxetine 20 mg capsule Commonly known as:  PROzac Take 1 Cap by mouth daily. fluticasone 50 mcg/actuation nasal spray Commonly known as:  Domenica Ellis 2 Sprays by Both Nostrils route daily. loratadine 10 mg tablet Commonly known as:  Jasmyn Callejas Take 1 Tab by mouth daily. MOTRIN  mg tablet Generic drug:  ibuprofen Take  by mouth. ondansetron hcl 8 mg tablet Commonly known as:  Suha Karissa Take 1 Tab by mouth every eight (8) hours as needed for Nausea for up to 14 days. traMADol 50 mg tablet Commonly known as:  ULTRAM  
Take 1 Tab by mouth every six (6) hours as needed for Pain. Max Daily Amount: 200 mg. Patient Instructions Hidradenitis Suppurativa: Care Instructions Your Care Instructions Hidradenitis suppurativa (say \"yyt-ywod-br-NY-tus sup-yur-uh-TY-vuh\") is a skin condition that causes lumps on the skin that look like pimples or boils.  The lumps are usually painful and can break open and drain blood and bad-smelling pus. The condition can come and go for many years. Treatment for this condition may include antibiotics and other medicines. You may need surgery to remove the lumps. Home care includes wearing loose-fitting clothes and washing the area gently. You can help prevent lumps from coming back by staying at a healthy weight and not smoking. Doctors don't know exactly how this condition starts. But they do know that something irritates and inflames the hair follicles, causing them to swell and form lumps. This skin condition can't be spread from person to person (isn't contagious). Follow-up care is a key part of your treatment and safety. Be sure to make and go to all appointments, and call your doctor if you are having problems. It's also a good idea to know your test results and keep a list of the medicines you take. How can you care for yourself at home? 
[facility] care 
  · Wash the area every day with mild soap. Use your hands rather than a washcloth or sponge when you wash that part of your body.  
  · Leave the affected areas uncovered when you can. If you have lumps that are draining, you can cover them with a bandage or other dressing. Put petroleum jelly (such as Vaseline) on the dressing to help keep it from sticking.  
  · Wear-loose fitting clothes that don't rub against the area. Avoid activities that cause skin to rub together.  
  · If you have pain, try a warm compress. Soak a towel or washcloth in warm water, wring it out, and place it on the affected skin for about 10 minutes. Medicines 
  · Be safe with medicines. Take your medicines exactly as prescribed. Call your doctor if you think you are having a problem with your medicine. You will get more details on the specific medicines your doctor prescribes.  
  · If your doctor prescribed antibiotics, take them as directed. Do not stop taking them just because you feel better. You need to take the full course of antibiotics.  Lifestyle choices 
  · If you smoke, think about quitting. Smoking can make the condition worse. If you need help quitting, talk to your doctor about stop-smoking programs and medicines. These can increase your chances of quitting for good.  
  · Stay at a healthy weight, or lose weight, by eating healthy foods and being physically active. Being overweight could make this condition worse. When should you call for help? Call your doctor now or seek immediate medical care if: 
  · You have symptoms of infection, such as: 
¨ Increased pain, swelling, warmth, or redness. ¨ Red streaks leading from the area. ¨ Pus draining from the area. ¨ A fever.  
 Watch closely for changes in your health, and be sure to contact your doctor if: 
  · You do not get better as expected. Where can you learn more? Go to http://tavia-dontrell.info/. Enter A478 in the search box to learn more about \"Hidradenitis Suppurativa: Care Instructions. \" Current as of: April 18, 2018 Content Version: 11.8 © 8003-2022 [employer]. Care instructions adapted under license by [employer] (which disclaims liability or warranty for this information). If you have questions about a medical condition or this instruction, always ask your healthcare professional. [place] [address] any warranty or liability for your use of this information. Introducing \[facility] [place] [facility]\"" & HEALTH SERVICES! [facility] introduces bright box patient portal. Now you can access parts of your medical record, email your doctor's office, and request medication refills online. 1. In your internet browser, go to [email or web address] 2. Click on the First Time User? Click Here link in the Sign In box. You will see the New Member Sign Up page. 3. Enter your bright box Access Code exactly as it appears below. You will not need to use this code after youve completed the sign-up process.  If you do not sign up before the expiration date, you must request a new code. · [employer] Access Code: [email or web address] Expires: 1/7/2019  2:47 PM 
 
4. Enter the last four digits of your Social Security Number (xxxx) and Date of Birth (mm/dd/yyyy) as indicated and click Submit. You will be taken to the next sign-up page. 5. Create a [employer] ID. This will be your [employer] login ID and cannot be changed, so think of one that is secure and easy to remember. 6. Create a [employer] password. You can change your password at any time. 7. Enter your Password Reset Question and Answer. This can be used at a later time if you forget your password. 8. Enter your e-mail address. You will receive e-mail notification when new information is available in [address] 9. Click Sign Up. You can now view and download portions of your medical record. 10. Click the Download Summary menu link to download a portable copy of your medical information. If you have questions, please visit the Frequently Asked Questions section of the [employer] website. Remember, [employer] is NOT to be used for urgent needs. For medical emergencies, dial 911. Now available from your iPhone and Android! Please provide this summary of care documentation to your next provider. Your primary care clinician is listed as Nicole Henao. If you have any questions after today's visit, please call [phone].

## 2018-10-11 NOTE — PROGRESS NOTES
Subjective: Patient had drainage and discomfort in the intergluteal cleft. She is status post pilonidal cyst excision with healing by secondary intention. This was in 2016. Past medical history and ROS were reviewed and unchanged. Intergluteal cleft shows midline pits beneath the original excision site with a small amount of exudate  There is no area of fluctuance or induration, though the original excision site is somewhat firm and tender    Assessment / Plan    Recurrent pilonidal cyst/abscess  Continue with doxycycline  Follow-up in 10 days  May consider MRI and repeat excision    A total of 15 minutes was spent with the patient, with >50% of time spent on counseling and coordination of care. The diagnoses and plan were discussed with patient. All questions answered. Plan of care agreed to by all concerned.

## 2018-10-11 NOTE — PROGRESS NOTES
Artie Betts is a 25 y.o. female presented in clinic today for   Chief Complaint   Patient presents with    Follow-up     for skin issues     Dental Pain     1. Have you been to the ER, urgent care clinic since your last visit? Hospitalized since your last visit? No    2. Have you seen or consulted any other health care providers outside of the [address] [place] [address] since your last visit? Include any pap smears or colon screening.  No

## 2018-10-12 LAB
BACTERIA SPEC CULT: ABNORMAL
GRAM STN SPEC: ABNORMAL
GRAM STN SPEC: ABNORMAL
SERVICE CMNT-IMP: ABNORMAL

## 2018-11-29 ENCOUNTER — HOSPITAL ENCOUNTER (EMERGENCY)
Age: 23
Discharge: STILL ADMITTED OR ADMINISTRATIVE | End: 2018-11-29
Attending: EMERGENCY MEDICINE
Payer: SELF-PAY

## 2018-11-29 PROCEDURE — 75810000275 HC EMERGENCY DEPT VISIT NO LEVEL OF CARE

## 2018-12-09 ENCOUNTER — APPOINTMENT (OUTPATIENT)
Dept: GENERAL RADIOLOGY | Age: 23
End: 2018-12-09
Attending: EMERGENCY MEDICINE
Payer: SELF-PAY

## 2018-12-09 ENCOUNTER — HOSPITAL ENCOUNTER (EMERGENCY)
Age: 23
Discharge: HOME | End: 2018-12-09
Attending: EMERGENCY MEDICINE
Payer: SELF-PAY

## 2018-12-09 VITALS
RESPIRATION RATE: 16 BRPM | HEART RATE: 103 BPM | TEMPERATURE: 98.9 F | SYSTOLIC BLOOD PRESSURE: 138 MMHG | DIASTOLIC BLOOD PRESSURE: 84 MMHG | OXYGEN SATURATION: 100 %

## 2018-12-09 DIAGNOSIS — M25.512 ACUTE PAIN OF LEFT SHOULDER: Primary | ICD-10-CM

## 2018-12-09 PROCEDURE — 99282 EMERGENCY DEPT VISIT SF MDM: CPT

## 2018-12-09 PROCEDURE — 73030 X-RAY EXAM OF SHOULDER: CPT

## 2018-12-09 RX ORDER — LIDOCAINE 50 MG/G
PATCH TOPICAL
Qty: 30 EACH | Refills: 0 | Status: SHIPPED | OUTPATIENT
Start: 2018-12-09 | End: 2019-06-17

## 2018-12-09 RX ORDER — IBUPROFEN 800 MG/1
800 TABLET ORAL EVERY 8 HOURS
Qty: 15 TAB | Refills: 0 | Status: SHIPPED | OUTPATIENT
Start: 2018-12-09 | End: 2018-12-14

## 2018-12-09 NOTE — ED PROVIDER NOTES
EMERGENCY DEPARTMENT HISTORY AND PHYSICAL EXAM    4:47 PM      Date: 12/9/2018  Patient Name: Annamarie Martinez    History of Presenting Illness     Chief Complaint   Patient presents with    Arm Pain    Rib Pain    Elbow Pain         History Provided By: Patient    Chief Complaint: Arm pain  Duration: 4 Days  Timing:  Intermittent  Location: Left arm  Quality: Burning and Stabbing  Severity: Moderate  Modifying Factors: No modifying or aggravating factors were reported. Associated Symptoms: left-sided rib pain, left shoulder pain, left elbow pain      Additional History (Context): 4:50 PM Annamarie Martinez is a 25 y.o. female with h/o anxiety, asthma, hidradenitis suppurativa, and other noted PMHx who presents to ED complaining of moderate, intermittent, left arm pain onset 4 days, with associated left-sided rib pain, left shoulder pain, and left left elbow pain. The patient reports that her left elbow felt \"like it was on fire\" last night. She states that the \"top\" of her arm felt like it was \"getting stabbed\" this morning. The patient denies injury, numbness, and weakness. She claims that she had a \"partial negative\" using a home pregnancy test, and has since started her menstrual period. No other concerns or symptoms at this time. PCP: Marycarmen Hernandez PA-C        Current Outpatient Medications   Medication Sig Dispense Refill    ibuprofen (MOTRIN IB) 200 mg tablet Take  by mouth.  ibuprofen (MOTRIN) 800 mg tablet Take 1 Tab by mouth every eight (8) hours as needed for Pain. 60 Tab 0    traMADol (ULTRAM) 50 mg tablet Take 1 Tab by mouth every six (6) hours as needed for Pain. Max Daily Amount: 200 mg. 8 Tab 0    FLUoxetine (PROZAC) 20 mg capsule Take 1 Cap by mouth daily. 30 Cap 3    loratadine (CLARITIN) 10 mg tablet Take 1 Tab by mouth daily. 100 Tab 0    fluticasone (FLONASE) 50 mcg/actuation nasal spray 2 Sprays by Both Nostrils route daily.  1 Bottle 0    albuterol (PROVENTIL HFA, VENTOLIN HFA, PROAIR HFA) 90 mcg/actuation inhaler Take 2 Puffs by inhalation every four (4) hours as needed for Wheezing. 1 Inhaler 3       Past History     Past Medical History:  Past Medical History:   Diagnosis Date    Anxiety and depression     Asthma     Hidradenitis suppurativa     Menorrhagia     Migraine     Tobacco abuse     Started around age 15 years       Past Surgical History:  Past Surgical History:   Procedure Laterality Date    DRAIN PILONIDAL CYST COMPLIC  7-84-70    Dr. Suzi Francois    HX OTHER SURGICAL      excision pilonidal cyst    REMV PILONIDAL LESION EXTENS N/A 10/21/2016    Dr. Suzi Francois       Family History:  Family History   Problem Relation Age of Onset    Hypertension Mother     Other Mother         anxiety, fibromyalgia    Cancer Paternal Grandmother 54        ovarian       Social History:  Social History     Tobacco Use    Smoking status: Current Every Day Smoker     Packs/day: 0.25     Types: Cigarettes     Start date: 1/1/2009    Smokeless tobacco: Never Used   Substance Use Topics    Alcohol use: Yes     Alcohol/week: 1.2 oz     Types: 2 Shots of liquor per week     Comment: rarely    Drug use: No       Allergies: Allergies   Allergen Reactions    Amoxicillin Rash    Codeine Other (comments)     \"mean\"           Review of Systems       Review of Systems   Musculoskeletal: Positive for arthralgias. Neurological: Negative for weakness and numbness. All other systems reviewed and are negative. Physical Exam     Visit Vitals  /84   Pulse (!) 103   Temp 98.9 °F (37.2 °C)   Resp 16   SpO2 100%         Physical Exam   Constitutional: She is oriented to person, place, and time. She appears well-developed. HENT:   Head: Normocephalic and atraumatic. Eyes: Pupils are equal, round, and reactive to light. Neck: No JVD present. No tracheal deviation present. No thyromegaly present. Cardiovascular: Normal rate, regular rhythm and normal heart sounds.  Exam reveals no gallop and no friction rub. No murmur heard. Pulmonary/Chest: Effort normal and breath sounds normal. No stridor. No respiratory distress. She has no wheezes. She has no rales. She exhibits no tenderness. Abdominal: Soft. She exhibits no distension and no mass. There is no tenderness. There is no rebound and no guarding. Musculoskeletal: She exhibits no edema or tenderness. Left shoulder:  Abd: 90  Ext: 45  FF: 160 w/pain  Ext rot: 45  Int rot: 25  Negative modified o briens. No ant jt line TTP. Mild anterior AC jt TTP as well as post jt line TTP. No RC testing pain or weakness. Negative Neers. Lymphadenopathy:     She has no cervical adenopathy. Neurological: She is alert and oriented to person, place, and time. Skin: Skin is warm and dry. No rash noted. No erythema. No pallor. Psychiatric: She has a normal mood and affect. Her behavior is normal. Thought content normal.   Nursing note and vitals reviewed. Diagnostic Study Results     Labs -  No results found for this or any previous visit (from the past 12 hour(s)). Radiologic Studies -   XR SHOULDER LT AP/LAT MIN 2 V    (Results Pending)         Medical Decision Making   I am the first provider for this patient. I reviewed the vital signs, available nursing notes, past medical history, past surgical history, family history and social history. Vital Signs-Reviewed the patient's vital signs. Records Reviewed: Nursing Notes (Time of Review: 4:47 PM)    ED Course: Progress Notes, Reevaluation, and Consults:      Provider Notes (Medical Decision Making): MDM  Exam not consistent with any acute injury but may have bursitis. Give pain meds, rest from work and f/up with PCP as needed. Nothing acute on films. Diagnosis     Clinical Impression: No diagnosis found.     Disposition: DC    Follow-up Information    None             Medication List      ASK your doctor about these medications    albuterol 90 mcg/actuation inhaler  Commonly known as:  PROVENTIL HFA, VENTOLIN HFA, PROAIR HFA  Take 2 Puffs by inhalation every four (4) hours as needed for Wheezing. FLUoxetine 20 mg capsule  Commonly known as:  PROzac  Take 1 Cap by mouth daily. fluticasone 50 mcg/actuation nasal spray  Commonly known as:  FLONASE  2 Sprays by Both Nostrils route daily. loratadine 10 mg tablet  Commonly known as:  CLARITIN  Take 1 Tab by mouth daily. * MOTRIN  mg tablet  Generic drug:  ibuprofen     * ibuprofen 800 mg tablet  Commonly known as:  MOTRIN  Take 1 Tab by mouth every eight (8) hours as needed for Pain.     traMADol 50 mg tablet  Commonly known as:  ULTRAM  Take 1 Tab by mouth every six (6) hours as needed for Pain. Max Daily Amount: 200 mg.         * This list has 2 medication(s) that are the same as other medications prescribed for you. Read the directions carefully, and ask your doctor or other care provider to review them with you.              _______________________________    Attestations:  Doreen Cantu acting as a scribe for and in the presence of Katharine Escobar    December 09, 2018 at 4:47 PM       Provider Attestation:      I personally performed the services described in the documentation, reviewed the documentation, as recorded by the scribe in my presence, and it accurately and completely records my words and actions.  December 09, 2018 at 4:47 PM - ABDOUL Escobar  _______________________________

## 2018-12-09 NOTE — LETTER
NOTIFICATION RETURN TO WORK / SCHOOL 
 
12/9/2018 5:54 PM 
 
[facility]j [address] [facility] [address] To Whom It May Concern: 
 
Paola Jj is currently under the care of [facility]. She will return to work/school on: 12/10/2018 If there are questions or concerns please have the patient contact our office.  
 
 
 
Sincerely, 
 
 
 
 
 
Katharine Martins

## 2018-12-09 NOTE — ED TRIAGE NOTES
Patient complains of left sided rib pain and left arm pain that extends to the elbow x 4 days . Patient denies trauma to the arm.

## 2019-03-25 ENCOUNTER — APPOINTMENT (OUTPATIENT)
Dept: GENERAL RADIOLOGY | Age: 24
End: 2019-03-25
Attending: PHYSICIAN ASSISTANT
Payer: SELF-PAY

## 2019-03-25 ENCOUNTER — HOSPITAL ENCOUNTER (EMERGENCY)
Age: 24
Discharge: HOME | End: 2019-03-25
Attending: EMERGENCY MEDICINE
Payer: SELF-PAY

## 2019-03-25 VITALS
DIASTOLIC BLOOD PRESSURE: 70 MMHG | OXYGEN SATURATION: 98 % | TEMPERATURE: 98.5 F | WEIGHT: 260 LBS | BODY MASS INDEX: 44.39 KG/M2 | SYSTOLIC BLOOD PRESSURE: 118 MMHG | RESPIRATION RATE: 20 BRPM | HEART RATE: 103 BPM | HEIGHT: 64 IN

## 2019-03-25 DIAGNOSIS — J06.9 ACUTE URI: Primary | ICD-10-CM

## 2019-03-25 DIAGNOSIS — J45.21 MILD INTERMITTENT ASTHMA WITH ACUTE EXACERBATION: ICD-10-CM

## 2019-03-25 LAB
FLUAV RNA SPEC QL NAA+PROBE: NEGATIVE
FLUBV RNA SPEC QL NAA+PROBE: NEGATIVE

## 2019-03-25 PROCEDURE — 87502 INFLUENZA DNA AMP PROBE: CPT

## 2019-03-25 PROCEDURE — 74011636637 HC RX REV CODE- 636/637: Performed by: PHYSICIAN ASSISTANT

## 2019-03-25 PROCEDURE — 71046 X-RAY EXAM CHEST 2 VIEWS: CPT

## 2019-03-25 PROCEDURE — 99283 EMERGENCY DEPT VISIT LOW MDM: CPT

## 2019-03-25 PROCEDURE — 94640 AIRWAY INHALATION TREATMENT: CPT

## 2019-03-25 PROCEDURE — 74011000250 HC RX REV CODE- 250: Performed by: PHYSICIAN ASSISTANT

## 2019-03-25 PROCEDURE — 77030029684 HC NEB SM VOL KT MONA -A

## 2019-03-25 RX ORDER — IPRATROPIUM BROMIDE AND ALBUTEROL SULFATE 2.5; .5 MG/3ML; MG/3ML
3 SOLUTION RESPIRATORY (INHALATION)
Status: COMPLETED | OUTPATIENT
Start: 2019-03-25 | End: 2019-03-25

## 2019-03-25 RX ORDER — PREDNISONE 50 MG/1
50 TABLET ORAL DAILY
Qty: 3 TAB | Refills: 0 | Status: SHIPPED | OUTPATIENT
Start: 2019-03-25 | End: 2019-03-28

## 2019-03-25 RX ORDER — PREDNISONE 20 MG/1
60 TABLET ORAL
Status: COMPLETED | OUTPATIENT
Start: 2019-03-25 | End: 2019-03-25

## 2019-03-25 RX ADMIN — IPRATROPIUM BROMIDE AND ALBUTEROL SULFATE 3 ML: .5; 3 SOLUTION RESPIRATORY (INHALATION) at 15:09

## 2019-03-25 RX ADMIN — PREDNISONE 60 MG: 20 TABLET ORAL at 15:09

## 2019-03-25 NOTE — ED TRIAGE NOTES
\"I have a cold and I also have asthma. I can't quite get a full breath. \"  Admits to productive cough.

## 2019-03-25 NOTE — ED PROVIDER NOTES
EMERGENCY DEPARTMENT HISTORY AND PHYSICAL EXAM    2:51 PM  Date: 3/25/2019  Patient Name: Cherrie Whitaker    History of Presenting Illness     Chief Complaint:    History Provided By:     HPI: Cherrie Whitaker is a 21 y.o. female with a past medical history of hidradenitis suppurativa, migraine who presents with cough times the past 4 days. Patient recently started Humira for her HS. she notes she has had recurrent upper respiratory infections which is complicating this. She does work at [employer] which may expose her to frequent upper respiratory infections. She notes here she has had cough and cannot feel like she can get a full breath for the past 4 days. No leg swelling no history of DVT. Not on oral contraceptives. No other aggravating or alleviating factors no other. Mary Bernal PCP: Salma Johnson PA-C    This was created with voice recognition software and transcription errors may be present. Past History     Past Medical History:  Past Medical History:   Diagnosis Date    Anxiety and depression     Asthma     Hidradenitis suppurativa     Menorrhagia     Migraine     Tobacco abuse     Started around age 15 years       Past Surgical History:  Past Surgical History:   Procedure Laterality Date    DRAIN PILONIDAL CYST COMPLIC  2-83-24    Dr. Breanne Garcia    HX OTHER SURGICAL      excision pilonidal cyst    REMV PILONIDAL LESION EXTENS N/A 10/21/2016    Dr. Breanne Garcia       Family History:  Family History   Problem Relation Age of Onset    Hypertension Mother     Other Mother         anxiety, fibromyalgia    Cancer Paternal Grandmother 54        ovarian       Social History:  Social History     Tobacco Use    Smoking status: Current Every Day Smoker     Packs/day: 0.25     Types: Cigarettes     Start date: 1/1/2009    Smokeless tobacco: Never Used   Substance Use Topics    Alcohol use: Yes     Alcohol/week: 1.2 oz     Types: 2 Shots of liquor per week     Comment: rarely    Drug use:  No Allergies: Allergies   Allergen Reactions    Amoxicillin Rash    Codeine Other (comments)     \"mean\"         Review of Systems     Review of Systems   Constitutional: Negative for fever. Respiratory: Positive for shortness of breath. All other systems reviewed and are negative. Physical Exam       Physical Exam   Constitutional: She is oriented to person, place, and time. She appears well-developed. HENT:   Head: Normocephalic and atraumatic. Eyes: Pupils are equal, round, and reactive to light. EOM are normal.   Neck: Normal range of motion. Neck supple. Cardiovascular: Normal rate, regular rhythm and normal heart sounds. Exam reveals no friction rub. No murmur heard. Pulmonary/Chest: Effort normal and breath sounds normal. No respiratory distress. She has no wheezes. Abdominal: Soft. She exhibits no distension. There is no tenderness. There is no rebound and no guarding. Musculoskeletal: Normal range of motion. Neurological: She is alert and oriented to person, place, and time. Skin: Skin is warm and dry. Psychiatric: She has a normal mood and affect. Her behavior is normal. Thought content normal.       Diagnostic Study Results     Vital Signs  Visit Vitals  /70 (BP 1 Location: Left arm, BP Patient Position: At rest)   Pulse (!) 103   Temp 98.5 °F (36.9 °C)   Resp 20   Ht 5' 4\" (1.626 m)   Wt 117.9 kg (260 lb)   LMP 03/09/2019 (Exact Date)   SpO2 98%   BMI 44.63 kg/m²         EKG:    Labs:     Imaging: Chest x-ray is negative. Flu swab was negative. Medical Decision Making     ED Course: Progress Notes, Reevaluation, and Consults:      Provider Notes (Medical Decision Making): This is a 19-year-old female who presents with shortness of breath and cough for 4 days. She is on Humira. Chest x-ray is unremarkable and I do not hear any wheezing on exam we will send a flu swab likely upper respiratory infection.   Her family is very reasonable I think we will monitor her closely based on her current findings but this can be done outpatient    Discussed the options of using albuterol and prednisone with the patient. Would be very limited and less this sort of upper respiratory infection is causing symptomatic exacerbation of her asthma which I did not hear on exam.  After using the albuterol patient states she felt much better the x-ray was negative flu is negative will discharge with albuterol and prednisone with likely operatory respiratory infection and acute asthma exacerbation due to the former       Diagnosis     Clinical Impression: No diagnosis found. Disposition:    Patient's Medications   Start Taking    No medications on file   Continue Taking    ALBUTEROL (PROVENTIL HFA, VENTOLIN HFA, PROAIR HFA) 90 MCG/ACTUATION INHALER    Take 2 Puffs by inhalation every four (4) hours as needed for Wheezing. FLUOXETINE (PROZAC) 20 MG CAPSULE    Take 1 Cap by mouth daily. FLUTICASONE (FLONASE) 50 MCG/ACTUATION NASAL SPRAY    2 Sprays by Both Nostrils route daily. IBUPROFEN (MOTRIN IB) 200 MG TABLET    Take  by mouth. IBUPROFEN (MOTRIN) 800 MG TABLET    Take 1 Tab by mouth every eight (8) hours as needed for Pain. LIDOCAINE (LIDODERM) 5 %    Apply patch to the affected area for 12 hours a day and remove for 12 hours a day. LORATADINE (CLARITIN) 10 MG TABLET    Take 1 Tab by mouth daily. TRAMADOL (ULTRAM) 50 MG TABLET    Take 1 Tab by mouth every six (6) hours as needed for Pain. Max Daily Amount: 200 mg.    These Medications have changed    No medications on file   Stop Taking    No medications on file     _______________________________    Attestations:  Karrie Boykin MD acting as a scribe for and in the presence of Zoe Amado MD      March 25, 2019 at 2:51 PM       Provider Attestation:      I personally performed the services described in the documentation, reviewed the documentation, as recorded by the scribe in my presence, and it accurately and completely records my words and actions.  March 25, 2019 at 2:51 PM - Lizandro Woodard MD    _______________________________

## 2019-04-14 ENCOUNTER — HOSPITAL ENCOUNTER (EMERGENCY)
Age: 24
Discharge: HOME | End: 2019-04-14
Attending: EMERGENCY MEDICINE
Payer: SELF-PAY

## 2019-04-14 ENCOUNTER — APPOINTMENT (OUTPATIENT)
Dept: CT IMAGING | Age: 24
End: 2019-04-14
Attending: EMERGENCY MEDICINE
Payer: SELF-PAY

## 2019-04-14 ENCOUNTER — APPOINTMENT (OUTPATIENT)
Dept: ULTRASOUND IMAGING | Age: 24
End: 2019-04-14
Attending: EMERGENCY MEDICINE
Payer: SELF-PAY

## 2019-04-14 VITALS
TEMPERATURE: 98.6 F | RESPIRATION RATE: 16 BRPM | DIASTOLIC BLOOD PRESSURE: 97 MMHG | SYSTOLIC BLOOD PRESSURE: 184 MMHG | WEIGHT: 260 LBS | BODY MASS INDEX: 44.39 KG/M2 | OXYGEN SATURATION: 99 % | HEIGHT: 64 IN | HEART RATE: 109 BPM

## 2019-04-14 DIAGNOSIS — N39.0 URINARY TRACT INFECTION WITH HEMATURIA, SITE UNSPECIFIED: Primary | ICD-10-CM

## 2019-04-14 DIAGNOSIS — R31.9 URINARY TRACT INFECTION WITH HEMATURIA, SITE UNSPECIFIED: Primary | ICD-10-CM

## 2019-04-14 LAB
ALBUMIN SERPL-MCNC: 3.5 G/DL (ref 3.4–5)
ALBUMIN/GLOB SERPL: 0.6 {RATIO} (ref 0.8–1.7)
ALP SERPL-CCNC: 107 U/L (ref 45–117)
ALT SERPL-CCNC: 28 U/L (ref 13–56)
ANION GAP SERPL CALC-SCNC: 6 MMOL/L (ref 3–18)
APPEARANCE UR: ABNORMAL
AST SERPL-CCNC: 17 U/L (ref 15–37)
BASOPHILS # BLD: 0 K/UL (ref 0–0.1)
BASOPHILS NFR BLD: 0 % (ref 0–2)
BILIRUB SERPL-MCNC: 0.2 MG/DL (ref 0.2–1)
BILIRUB UR QL: NEGATIVE
BUN SERPL-MCNC: 10 MG/DL (ref 7–18)
BUN/CREAT SERPL: 11 (ref 12–20)
CALCIUM SERPL-MCNC: 9.4 MG/DL (ref 8.5–10.1)
CHLORIDE SERPL-SCNC: 106 MMOL/L (ref 100–108)
CO2 SERPL-SCNC: 26 MMOL/L (ref 21–32)
COLOR UR: YELLOW
CREAT SERPL-MCNC: 0.89 MG/DL (ref 0.6–1.3)
DIFFERENTIAL METHOD BLD: ABNORMAL
EOSINOPHIL # BLD: 0.5 K/UL (ref 0–0.4)
EOSINOPHIL NFR BLD: 3 % (ref 0–5)
EPITH CASTS URNS QL MICRO: NORMAL /LPF (ref 0–5)
ERYTHROCYTE [DISTWIDTH] IN BLOOD BY AUTOMATED COUNT: 15.1 % (ref 11.6–14.5)
GLOBULIN SER CALC-MCNC: 5.6 G/DL (ref 2–4)
GLUCOSE SERPL-MCNC: 88 MG/DL (ref 74–99)
GLUCOSE UR STRIP.AUTO-MCNC: NEGATIVE MG/DL
HCG SERPL-ACNC: <1 MIU/ML (ref 0–10)
HCG UR QL: NEGATIVE
HCT VFR BLD AUTO: 40.6 % (ref 35–45)
HGB BLD-MCNC: 13.6 G/DL (ref 12–16)
HGB UR QL STRIP: ABNORMAL
KETONES UR QL STRIP.AUTO: NEGATIVE MG/DL
LEUKOCYTE ESTERASE UR QL STRIP.AUTO: ABNORMAL
LYMPHOCYTES # BLD: 4.3 K/UL (ref 0.9–3.6)
LYMPHOCYTES NFR BLD: 26 % (ref 21–52)
MCH RBC QN AUTO: 27.8 PG (ref 24–34)
MCHC RBC AUTO-ENTMCNC: 33.5 G/DL (ref 31–37)
MCV RBC AUTO: 82.9 FL (ref 74–97)
MONOCYTES # BLD: 0.9 K/UL (ref 0.05–1.2)
MONOCYTES NFR BLD: 5 % (ref 3–10)
NEUTS SEG # BLD: 10.5 K/UL (ref 1.8–8)
NEUTS SEG NFR BLD: 66 % (ref 40–73)
NITRITE UR QL STRIP.AUTO: NEGATIVE
PH UR STRIP: 8.5 [PH] (ref 5–8)
PLATELET # BLD AUTO: 446 K/UL (ref 135–420)
PMV BLD AUTO: 9 FL (ref 9.2–11.8)
POTASSIUM SERPL-SCNC: 4.1 MMOL/L (ref 3.5–5.5)
PROT SERPL-MCNC: 9.1 G/DL (ref 6.4–8.2)
PROT UR STRIP-MCNC: 100 MG/DL
RBC # BLD AUTO: 4.9 M/UL (ref 4.2–5.3)
RBC #/AREA URNS HPF: NORMAL /HPF (ref 0–5)
SODIUM SERPL-SCNC: 138 MMOL/L (ref 136–145)
SP GR UR REFRACTOMETRY: 1.01 (ref 1–1.03)
UROBILINOGEN UR QL STRIP.AUTO: 0.2 EU/DL (ref 0.2–1)
WBC # BLD AUTO: 16.2 K/UL (ref 4.6–13.2)
WBC URNS QL MICRO: NORMAL /HPF (ref 0–4)

## 2019-04-14 PROCEDURE — 84702 CHORIONIC GONADOTROPIN TEST: CPT

## 2019-04-14 PROCEDURE — 81001 URINALYSIS AUTO W/SCOPE: CPT

## 2019-04-14 PROCEDURE — 76830 TRANSVAGINAL US NON-OB: CPT

## 2019-04-14 PROCEDURE — 81025 URINE PREGNANCY TEST: CPT

## 2019-04-14 PROCEDURE — 85025 COMPLETE CBC W/AUTO DIFF WBC: CPT

## 2019-04-14 PROCEDURE — 86900 BLOOD TYPING SEROLOGIC ABO: CPT

## 2019-04-14 PROCEDURE — 74176 CT ABD & PELVIS W/O CONTRAST: CPT

## 2019-04-14 PROCEDURE — 99283 EMERGENCY DEPT VISIT LOW MDM: CPT

## 2019-04-14 PROCEDURE — 80053 COMPREHEN METABOLIC PANEL: CPT

## 2019-04-14 RX ORDER — CEFTRIAXONE 250 MG/8ML
1000 INJECTION, POWDER, FOR SOLUTION INTRAMUSCULAR; INTRAVENOUS ONCE
Status: DISCONTINUED | OUTPATIENT
Start: 2019-04-14 | End: 2019-04-14 | Stop reason: CLARIF

## 2019-04-14 RX ORDER — CEPHALEXIN 500 MG/1
500 CAPSULE ORAL 3 TIMES DAILY
Qty: 30 CAP | Refills: 0 | Status: SHIPPED | OUTPATIENT
Start: 2019-04-14 | End: 2019-04-24

## 2019-04-14 NOTE — DISCHARGE INSTRUCTIONS

## 2019-04-14 NOTE — ED PROVIDER NOTES
EMERGENCY DEPARTMENT HISTORY AND PHYSICAL EXAM    3:37 PM      Date: 4/14/2019  Patient Name: Apolonia Banda    History of Presenting Illness     Chief Complaint   Patient presents with    Pregnancy Problem         History Provided By: Patient      Additional History (Context): Apolonia Banda is a 21 y.o. female with hidradenitis suppurativa, asthma, anxiety and depression who presents with right pelvic pain and suprapubic abdominal pain since her menstrual cycle began yesterday. The pt states she had a positive at home pregnancy test yesterday but suspects it was a false positive since she had her period yesterday (6 days late). The pt states she is trying to conceive. Pt reports vaginal bleeding and cramping mostly on the right sided. No other concerns or symptoms at this time. PCP: Jaylen Rowan PA-C    Chief Complaint: abd pain  Duration:  Hours  Timing:  Progressive  Location: right sided  Quality: Aching  Severity: Mild  Modifying Factors: none  Associated Symptoms: vaginal bleeding    Current Facility-Administered Medications   Medication Dose Route Frequency Provider Last Rate Last Dose    sodium chloride 0.9 % bolus infusion 1,000 mL  1,000 mL IntraVENous ONCE Lena Sneed DO        cefTRIAXone (ROCEPHIN) 1 g in sterile water (preservative free) 10 mL IV syringe  1 g IntraVENous ONCE Lena Sneed, DO         Current Outpatient Medications   Medication Sig Dispense Refill    cephALEXin (KEFLEX) 500 mg capsule Take 1 Cap by mouth three (3) times daily for 10 days. 30 Cap 0    albuterol sulfate 90 mcg/actuation aepb Take 1 Puff by inhalation every four (4) hours as needed for Cough. 1 Inhaler 0    lidocaine (LIDODERM) 5 % Apply patch to the affected area for 12 hours a day and remove for 12 hours a day. 30 Each 0    ibuprofen (MOTRIN IB) 200 mg tablet Take  by mouth.  ibuprofen (MOTRIN) 800 mg tablet Take 1 Tab by mouth every eight (8) hours as needed for Pain.  61 Tab 0    traMADol (ULTRAM) 50 mg tablet Take 1 Tab by mouth every six (6) hours as needed for Pain. Max Daily Amount: 200 mg. 8 Tab 0    FLUoxetine (PROZAC) 20 mg capsule Take 1 Cap by mouth daily. 30 Cap 3    loratadine (CLARITIN) 10 mg tablet Take 1 Tab by mouth daily. 100 Tab 0    fluticasone (FLONASE) 50 mcg/actuation nasal spray 2 Sprays by Both Nostrils route daily. 1 Bottle 0       Past History     Past Medical History:  Past Medical History:   Diagnosis Date    Anxiety and depression     Asthma     Hidradenitis suppurativa     Menorrhagia     Migraine     Tobacco abuse     Started around age 15 years       Past Surgical History:  Past Surgical History:   Procedure Laterality Date    DRAIN PILONIDAL CYST COMPLIC  6-68-11    Dr. Nika Ma    HX OTHER SURGICAL      excision pilonidal cyst    REMV PILONIDAL LESION EXTENS N/A 10/21/2016    Dr. Nika Ma       Family History:  Family History   Problem Relation Age of Onset    Hypertension Mother     Other Mother         anxiety, fibromyalgia    Cancer Paternal Grandmother 54        ovarian       Social History:  Social History     Tobacco Use    Smoking status: Current Every Day Smoker     Packs/day: 0.25     Types: Cigarettes     Start date: 1/1/2009    Smokeless tobacco: Never Used   Substance Use Topics    Alcohol use: Yes     Alcohol/week: 1.2 oz     Types: 2 Shots of liquor per week     Comment: rarely    Drug use: No       Allergies: Allergies   Allergen Reactions    Amoxicillin Rash    Codeine Other (comments)     \"mean\"           Review of Systems       Review of Systems   Constitutional: Negative for activity change, fatigue and fever. HENT: Negative for congestion and rhinorrhea. Eyes: Negative for visual disturbance. Respiratory: Negative for shortness of breath. Cardiovascular: Negative for chest pain and palpitations. Gastrointestinal: Positive for abdominal pain (suprapubic). Negative for diarrhea, nausea and vomiting. Genitourinary: Positive for menstrual problem and pelvic pain. Negative for dysuria and hematuria. Musculoskeletal: Negative for back pain. Skin: Negative for rash. Neurological: Negative for dizziness, weakness and light-headedness. All other systems reviewed and are negative. Physical Exam     Visit Vitals  BP (!) 184/97 (BP 1 Location: Left arm, BP Patient Position: At rest)   Pulse (!) 109   Temp 98.6 °F (37 °C)   Resp 16   Ht 5' 4\" (1.626 m)   Wt 117.9 kg (260 lb)   LMP 03/09/2019 (Exact Date)   SpO2 99%   BMI 44.63 kg/m²       Physical Exam   Constitutional: She is oriented to person, place, and time. She appears well-developed and well-nourished. No distress. HENT:   Head: Normocephalic and atraumatic. Right Ear: External ear normal.   Left Ear: External ear normal.   Nose: Nose normal.   Mouth/Throat: Oropharynx is clear and moist.   Fluid behind b/l TMs   Eyes: Pupils are equal, round, and reactive to light. Conjunctivae and EOM are normal. No scleral icterus. Neck: Normal range of motion. Neck supple. No JVD present. No tracheal deviation present. No thyromegaly present. Cardiovascular: Normal rate, regular rhythm, normal heart sounds and intact distal pulses. Exam reveals no gallop and no friction rub. No murmur heard. Pulmonary/Chest: Effort normal and breath sounds normal. She exhibits no tenderness. Abdominal: Soft. Bowel sounds are normal. She exhibits no distension. There is no tenderness. There is no rebound and no guarding. Musculoskeletal: Normal range of motion. She exhibits no edema or tenderness. Lymphadenopathy:     She has no cervical adenopathy. Neurological: She is alert and oriented to person, place, and time. No cranial nerve deficit. Coordination normal.   Skin: Skin is warm and dry. Psychiatric: She has a normal mood and affect. Her behavior is normal. Judgment and thought content normal.   Nursing note and vitals reviewed.         Diagnostic Study Results     Labs -  Recent Results (from the past 12 hour(s))   URINALYSIS W/ RFLX MICROSCOPIC    Collection Time: 04/14/19  3:20 PM   Result Value Ref Range    Color YELLOW      Appearance BLOODY      Specific gravity 1.015 1.003 - 1.030      pH (UA) 8.5 (H) 5.0 - 8.0      Protein 100 (A) NEG mg/dL    Glucose NEGATIVE  NEG mg/dL    Ketone NEGATIVE  NEG mg/dL    Bilirubin NEGATIVE  NEG      Blood LARGE (A) NEG      Urobilinogen 0.2 0.2 - 1.0 EU/dL    Nitrites NEGATIVE  NEG      Leukocyte Esterase TRACE (A) NEG     HCG URINE, QL    Collection Time: 04/14/19  3:20 PM   Result Value Ref Range    HCG urine, QL NEGATIVE  NEG     URINE MICROSCOPIC ONLY    Collection Time: 04/14/19  3:20 PM   Result Value Ref Range    WBC  0 - 4 /hpf     UNABLE TO QUANTITATE MICROSCOPIC PARAMETERS DUE TO EXCESSIVE RBCS    RBC TOO NUMEROUS TO COUNT 0 - 5 /hpf    Epithelial cells 2+ 0 - 5 /lpf   CBC WITH AUTOMATED DIFF    Collection Time: 04/14/19  3:25 PM   Result Value Ref Range    WBC 16.2 (H) 4.6 - 13.2 K/uL    RBC 4.90 4.20 - 5.30 M/uL    HGB 13.6 12.0 - 16.0 g/dL    HCT 40.6 35.0 - 45.0 %    MCV 82.9 74.0 - 97.0 FL    MCH 27.8 24.0 - 34.0 PG    MCHC 33.5 31.0 - 37.0 g/dL    RDW 15.1 (H) 11.6 - 14.5 %    PLATELET 687 (H) 512 - 420 K/uL    MPV 9.0 (L) 9.2 - 11.8 FL    NEUTROPHILS 66 40 - 73 %    LYMPHOCYTES 26 21 - 52 %    MONOCYTES 5 3 - 10 %    EOSINOPHILS 3 0 - 5 %    BASOPHILS 0 0 - 2 %    ABS. NEUTROPHILS 10.5 (H) 1.8 - 8.0 K/UL    ABS. LYMPHOCYTES 4.3 (H) 0.9 - 3.6 K/UL    ABS. MONOCYTES 0.9 0.05 - 1.2 K/UL    ABS. EOSINOPHILS 0.5 (H) 0.0 - 0.4 K/UL    ABS.  BASOPHILS 0.0 0.0 - 0.1 K/UL    DF AUTOMATED     BETA HCG, QT    Collection Time: 04/14/19  3:25 PM   Result Value Ref Range    Beta HCG, QT <1 0 - 10 MIU/ML   TYPE & SCREEN    Collection Time: 04/14/19  3:25 PM   Result Value Ref Range    Crossmatch Expiration 04/17/2019     ABO/Rh(D) A POSITIVE     Antibody screen NEG    METABOLIC PANEL, COMPREHENSIVE    Collection Time: 04/14/19  3:25 PM   Result Value Ref Range    Sodium 138 136 - 145 mmol/L    Potassium 4.1 3.5 - 5.5 mmol/L    Chloride 106 100 - 108 mmol/L    CO2 26 21 - 32 mmol/L    Anion gap 6 3.0 - 18 mmol/L    Glucose 88 74 - 99 mg/dL    BUN 10 7.0 - 18 MG/DL    Creatinine 0.89 0.6 - 1.3 MG/DL    BUN/Creatinine ratio 11 (L) 12 - 20      GFR est AA >60 >60 ml/min/1.73m2    GFR est non-AA >60 >60 ml/min/1.73m2    Calcium 9.4 8.5 - 10.1 MG/DL    Bilirubin, total 0.2 0.2 - 1.0 MG/DL    ALT (SGPT) 28 13 - 56 U/L    AST (SGOT) 17 15 - 37 U/L    Alk. phosphatase 107 45 - 117 U/L    Protein, total 9.1 (H) 6.4 - 8.2 g/dL    Albumin 3.5 3.4 - 5.0 g/dL    Globulin 5.6 (H) 2.0 - 4.0 g/dL    A-G Ratio 0.6 (L) 0.8 - 1.7         Radiologic Studies -   CT ABD PELV WO CONT   Final Result   IMPRESSION:       1. No CT evidence for acute intra-abdominal/intrapelvic process. 2. Left ovarian cyst, better seen on same-day ultrasound. 3. Mildly prominent bilateral inguinal lymph nodes, of uncertain significance or   etiology. 4. Small fat-containing umbilical hernia. 5. Chronic bilateral L3 and L5 pars defects. No spondylolisthesis. US TRANSVAGINAL   Final Result   IMPRESSION:      1. No sonographic evidence for intrauterine pregnancy.    -In the setting of positive pregnancy test, this may be due to very early   pregnancy, failed pregnancy, or ectopic pregnancy. -Recommend short interval follow-up ultrasound and trending of beta hCG for   further evaluation, as clinically indicated. 2. Left ovarian cyst, possibly corpus luteum. 3. The right ovary was not visualized. Medical Decision Making     It should be noted that I,Lena Ramirez, DO  will be the provider of record for this patient. I reviewed the vital signs, available nursing notes, past medical history, past surgical history, family history and social history. Vital Signs-Reviewed the patient's vital signs.     Pulse Oximetry Analysis -  99 on room air (Interpretation)WNL    Records Reviewed: Nursing Notes and Old Medical Records (Time of Review: 3:37 PM)        Provider Notes (Medical Decision Making):   Patient is a 51-year-old female who comes in for pelvic pain and concern that she is pregnant. Patient is not pregnant. Pelvic pain resolved although right ovary not visualized. CT performed for bloody urine along with leukocyte positive urine. No evidence for kidney stone. Patient will be discharged home with antibiotics to cover for UTI as she had some flank pain earlier. Understands and agrees with plan. Stable for discharge. Will return if any worsening or concerning symptoms. Diagnosis     Clinical Impression:   1. Urinary tract infection with hematuria, site unspecified        Disposition: Discharge    Follow-up Information     Follow up With Specialties Details Why Contact Info    Diana Lewis PA-C Physician Assistant Call in 1 day  [address] [facility]  [phone]             Discharge Medication List as of 4/14/2019  6:04 PM      START taking these medications    Details   cephALEXin (KEFLEX) 500 mg capsule Take 1 Cap by mouth three (3) times daily for 10 days. , Print, Disp-30 Cap, R-0         CONTINUE these medications which have NOT CHANGED    Details   albuterol sulfate 90 mcg/actuation aepb Take 1 Puff by inhalation every four (4) hours as needed for Cough. , Print, Disp-1 Inhaler, R-0      lidocaine (LIDODERM) 5 % Apply patch to the affected area for 12 hours a day and remove for 12 hours a day., Print, Disp-30 Each, R-0      !! ibuprofen (MOTRIN IB) 200 mg tablet Take  by mouth., Historical Med      !! ibuprofen (MOTRIN) 800 mg tablet Take 1 Tab by mouth every eight (8) hours as needed for Pain., Normal, Disp-60 Tab, R-0      traMADol (ULTRAM) 50 mg tablet Take 1 Tab by mouth every six (6) hours as needed for Pain.  Max Daily Amount: 200 mg., Print, Disp-8 Tab, R-0      FLUoxetine (PROZAC) 20 mg capsule Take 1 Cap by mouth daily. , Normal, Disp-30 Cap, R-3      loratadine (CLARITIN) 10 mg tablet Take 1 Tab by mouth daily. , Print, Disp-100 Tab, R-0      fluticasone (FLONASE) 50 mcg/actuation nasal spray 2 Sprays by Both Nostrils route daily. , Print, Disp-1 Bottle, R-0       !! - Potential duplicate medications found. Please discuss with provider.        _______________________________       Neda Aragon acting as a scribe for and in the presence of Estrella Peñaloaz DO      April 14, 2019 at 3:37 PM       Provider Attestation:      I personally performed the services described in the documentation, reviewed the documentation, as recorded by the scribe in my presence, and it accurately and completely records my words and actions.  April 14, 2019 at 3:37 PM - Estrella KINCAID DO    _______________________________

## 2019-04-14 NOTE — ED TRIAGE NOTES
The patient presents for evaluation of abdominal pain. States, \"I had a positive pregnancy test.  I started bleeding yesterday. \"  LMP March 9th.

## 2019-04-15 LAB
ABO + RH BLD: NORMAL
BLOOD GROUP ANTIBODIES SERPL: NORMAL
SPECIMEN EXP DATE BLD: NORMAL

## 2019-06-17 ENCOUNTER — HOSPITAL ENCOUNTER (EMERGENCY)
Age: 24
Discharge: HOME | End: 2019-06-17
Attending: EMERGENCY MEDICINE
Payer: MEDICAID

## 2019-06-17 VITALS
TEMPERATURE: 98 F | BODY MASS INDEX: 44.39 KG/M2 | SYSTOLIC BLOOD PRESSURE: 138 MMHG | WEIGHT: 260 LBS | HEIGHT: 64 IN | DIASTOLIC BLOOD PRESSURE: 83 MMHG | HEART RATE: 100 BPM | RESPIRATION RATE: 16 BRPM | OXYGEN SATURATION: 100 %

## 2019-06-17 DIAGNOSIS — N30.01 ACUTE CYSTITIS WITH HEMATURIA: Primary | ICD-10-CM

## 2019-06-17 LAB
APPEARANCE UR: ABNORMAL
BACTERIA URNS QL MICRO: ABNORMAL /HPF
BILIRUB UR QL: ABNORMAL
COLOR UR: ABNORMAL
EPITH CASTS URNS QL MICRO: ABNORMAL /LPF (ref 0–5)
GLUCOSE UR STRIP.AUTO-MCNC: NEGATIVE MG/DL
HCG UR QL: NEGATIVE
HGB UR QL STRIP: ABNORMAL
KETONES UR QL STRIP.AUTO: 15 MG/DL
LEUKOCYTE ESTERASE UR QL STRIP.AUTO: ABNORMAL
MUCOUS THREADS URNS QL MICRO: ABNORMAL /LPF
NITRITE UR QL STRIP.AUTO: NEGATIVE
PH UR STRIP: >8.5 [PH] (ref 5–8)
PROT UR STRIP-MCNC: 100 MG/DL
RBC #/AREA URNS HPF: ABNORMAL /HPF (ref 0–5)
SP GR UR REFRACTOMETRY: 1.03 (ref 1–1.03)
UROBILINOGEN UR QL STRIP.AUTO: 1 EU/DL (ref 0.2–1)
WBC URNS QL MICRO: ABNORMAL /HPF (ref 0–4)

## 2019-06-17 PROCEDURE — 81001 URINALYSIS AUTO W/SCOPE: CPT

## 2019-06-17 PROCEDURE — 87086 URINE CULTURE/COLONY COUNT: CPT

## 2019-06-17 PROCEDURE — 81025 URINE PREGNANCY TEST: CPT

## 2019-06-17 PROCEDURE — 99283 EMERGENCY DEPT VISIT LOW MDM: CPT

## 2019-06-17 RX ORDER — NITROFURANTOIN 25; 75 MG/1; MG/1
100 CAPSULE ORAL 2 TIMES DAILY
Qty: 10 CAP | Refills: 0 | Status: SHIPPED | OUTPATIENT
Start: 2019-06-17 | End: 2019-06-22

## 2019-06-17 RX ORDER — ONDANSETRON 4 MG/1
TABLET, ORALLY DISINTEGRATING ORAL
Qty: 10 TAB | Refills: 0 | Status: SHIPPED | OUTPATIENT
Start: 2019-06-17 | End: 2022-01-13

## 2019-06-17 NOTE — ED PROVIDER NOTES
EMERGENCY DEPARTMENT HISTORY AND PHYSICAL EXAM    4:45 PM      Date: 6/17/2019  Patient Name: Linda Moreno    History of Presenting Illness     Chief Complaint   Patient presents with    Flank Pain         History Provided By: Patient    Additional History (Context): Linda Moreno is a 21 y.o. female with hx of anxiety, asthma, depression who presents with complaint of dysuria and nausea x 2 days. Also notes R sided low back pain. Denies fever or chills, abdominal pain, vomiting, vaginal discharge. Notes she is concerned for a UTI as she was diagnosed with a UTI 2 months ago and did not complete the abx. Notes she is currently on her menstrual cycle. PCP: None    Current Outpatient Medications   Medication Sig Dispense Refill    nitrofurantoin, macrocrystal-monohydrate, (MACROBID) 100 mg capsule Take 1 Cap by mouth two (2) times a day for 5 days. 10 Cap 0    FLUoxetine (PROZAC) 20 mg capsule Take 1 Cap by mouth daily. 30 Cap 3    loratadine (CLARITIN) 10 mg tablet Take 1 Tab by mouth daily. 100 Tab 0    fluticasone (FLONASE) 50 mcg/actuation nasal spray 2 Sprays by Both Nostrils route daily.  1 Bottle 0       Past History     Past Medical History:  Past Medical History:   Diagnosis Date    Anxiety and depression     Asthma     Hidradenitis suppurativa     Menorrhagia     Migraine     Tobacco abuse     Started around age 15 years       Past Surgical History:  Past Surgical History:   Procedure Laterality Date    DRAIN PILONIDAL CYST COMPLIC  5-90-62    Dr. Familia Simpson    HX OTHER SURGICAL      excision pilonidal cyst    REMV PILONIDAL LESION EXTENS N/A 10/21/2016    Dr. Familia Simpson       Family History:  Family History   Problem Relation Age of Onset    Hypertension Mother     Other Mother         anxiety, fibromyalgia    Cancer Paternal Grandmother 54        ovarian       Social History:  Social History     Tobacco Use    Smoking status: Current Every Day Smoker     Packs/day: 0.25     Types: Cigarettes     Start date: 1/1/2009    Smokeless tobacco: Never Used   Substance Use Topics    Alcohol use: Yes     Alcohol/week: 1.2 oz     Types: 2 Shots of liquor per week     Comment: rarely    Drug use: No       Allergies: Allergies   Allergen Reactions    Amoxicillin Rash    Codeine Other (comments)     \"mean\"           Review of Systems       Review of Systems   Constitutional: Negative for chills and fever. Respiratory: Negative for shortness of breath. Cardiovascular: Negative for chest pain. Gastrointestinal: Positive for nausea. Negative for abdominal pain and vomiting. Genitourinary: Positive for dysuria. Skin: Negative for rash. Neurological: Negative for weakness. All other systems reviewed and are negative. Physical Exam     Visit Vitals  /83 (BP 1 Location: Right arm, BP Patient Position: At rest)   Pulse 100   Temp 98 °F (36.7 °C)   Resp 16   Ht 5' 4\" (1.626 m)   Wt 117.9 kg (260 lb)   LMP 03/09/2019 (Exact Date)   SpO2 100%   BMI 44.63 kg/m²         Physical Exam   Constitutional: She appears well-developed and well-nourished. No distress. HENT:   Head: Normocephalic and atraumatic. Neck: Normal range of motion. Neck supple. Cardiovascular: Normal rate, regular rhythm, normal heart sounds and intact distal pulses. Exam reveals no gallop and no friction rub. No murmur heard. Pulmonary/Chest: Effort normal and breath sounds normal. No respiratory distress. She has no wheezes. She has no rales. Abdominal: Soft. Bowel sounds are normal. She exhibits no distension and no mass. There is no tenderness. There is no rebound and no guarding. No CVA tenderness    Musculoskeletal: Normal range of motion. Neurological: She is alert. Skin: Skin is warm. No rash noted. She is not diaphoretic. Nursing note and vitals reviewed.         Diagnostic Study Results     Labs -  Recent Results (from the past 12 hour(s))   URINALYSIS W/ RFLX MICROSCOPIC    Collection Time: 06/17/19  4:22 PM   Result Value Ref Range    Color DARK YELLOW      Appearance CLOUDY      Specific gravity 1.028 1.005 - 1.030      pH (UA) >8.5 (H) 5.0 - 8.0    Protein 100 (A) NEG mg/dL    Glucose NEGATIVE  NEG mg/dL    Ketone 15 (A) NEG mg/dL    Bilirubin SMALL (A) NEG      Blood LARGE (A) NEG      Urobilinogen 1.0 0.2 - 1.0 EU/dL    Nitrites NEGATIVE  NEG      Leukocyte Esterase LARGE (A) NEG     HCG URINE, QL    Collection Time: 06/17/19  4:22 PM   Result Value Ref Range    HCG urine, QL NEGATIVE  NEG     URINE MICROSCOPIC ONLY    Collection Time: 06/17/19  4:22 PM   Result Value Ref Range    WBC 3 to 4 0 - 4 /hpf    RBC 5 to 10 0 - 5 /hpf    Epithelial cells 4+ 0 - 5 /lpf    Bacteria 2+ (A) NEG /hpf    Mucus 4+ (A) NEG /lpf       Radiologic Studies -   No orders to display         Medical Decision Making   I am the first provider for this patient. I reviewed the vital signs, available nursing notes, past medical history, past surgical history, family history and social history. Vital Signs-Reviewed the patient's vital signs. Records Reviewed: Nursing Notes and Old Medical Records (Time of Review: 4:45 PM)    ED Course: Progress Notes, Reevaluation, and Consults:  5:24PM  Reviewed results with patient. Discussed need for close outpatient follow-up. Discussed strict return precautions, including fever, vomiting, or any other medical concerns. Provider Notes (Medical Decision Making): 61-year-old female who presents due to dysuria and nausea x days. Afebrile, nontoxic-appearing, looks well. No abdominal tenderness or CVA tenderness. UA with 3-4 WBCs, large leukocyte esterase, blood is 2/2 patient being on her menstrual cycle. Will send culture and treat. Stable for discharge with close outpatient follow-up      Diagnosis     Clinical Impression:   1.  Acute cystitis with hematuria        Disposition: home     Follow-up Information     Follow up With Specialties Details Why Contact Info [facility] EMERGENCY DEPT Emergency Medicine  If symptoms worsen [address] [place] [id number]  [address]  Schedule an appointment as soon as possible for a visit  Maira Bhup[address]  [address]  [phone]           Patient's Medications   Start Taking    NITROFURANTOIN, MACROCRYSTAL-MONOHYDRATE, (MACROBID) 100 MG CAPSULE    Take 1 Cap by mouth two (2) times a day for 5 days. Continue Taking    FLUOXETINE (PROZAC) 20 MG CAPSULE    Take 1 Cap by mouth daily. FLUTICASONE (FLONASE) 50 MCG/ACTUATION NASAL SPRAY    2 Sprays by Both Nostrils route daily. LORATADINE (CLARITIN) 10 MG TABLET    Take 1 Tab by mouth daily. These Medications have changed    No medications on file   Stop Taking    ALBUTEROL SULFATE 90 MCG/ACTUATION AEPB    Take 1 Puff by inhalation every four (4) hours as needed for Cough. IBUPROFEN (MOTRIN IB) 200 MG TABLET    Take  by mouth. IBUPROFEN (MOTRIN) 800 MG TABLET    Take 1 Tab by mouth every eight (8) hours as needed for Pain. LIDOCAINE (LIDODERM) 5 %    Apply patch to the affected area for 12 hours a day and remove for 12 hours a day. TRAMADOL (ULTRAM) 50 MG TABLET    Take 1 Tab by mouth every six (6) hours as needed for Pain. Max Daily Amount: 200 mg. Dictation disclaimer:  Please note that this dictation was completed with [employer] voice recognition software. Quite often unanticipated grammatical, syntax, homophones, and other interpretive errors are inadvertently transcribed by the computer software. Please disregard these errors. Please excuse any errors that have escaped final proofreading.

## 2019-06-17 NOTE — DISCHARGE INSTRUCTIONS
Patient Education   Take medication as prescribed. Follow-up with your primary care physician in 2 days for reassessment. Bring the results from this visit with you for their review. Return to the ED immediately for any new, worsening, or persistent symptoms, including fever, vomiting, or any other medical concerns. Urinary Tract Infection in Women: Care Instructions  Your Care Instructions    A urinary tract infection, or UTI, is a general term for an infection anywhere between the kidneys and the urethra (where urine comes out). Most UTIs are bladder infections. They often cause pain or burning when you urinate. UTIs are caused by bacteria and can be cured with antibiotics. Be sure to complete your treatment so that the infection goes away. Follow-up care is a key part of your treatment and safety. Be sure to make and go to all appointments, and call your doctor if you are having problems. It's also a good idea to know your test results and keep a list of the medicines you take. How can you care for yourself at home? · Take your antibiotics as directed. Do not stop taking them just because you feel better. You need to take the full course of antibiotics. · Drink extra water and other fluids for the next day or two. This may help wash out the bacteria that are causing the infection. (If you have kidney, heart, or liver disease and have to limit fluids, talk with your doctor before you increase your fluid intake.)  · Avoid drinks that are carbonated or have caffeine. They can irritate the bladder. · Urinate often. Try to empty your bladder each time. · To relieve pain, take a hot bath or lay a heating pad set on low over your lower belly or genital area. Never go to sleep with a heating pad in place. To prevent UTIs  · Drink plenty of water each day. This helps you urinate often, which clears bacteria from your system.  (If you have kidney, heart, or liver disease and have to limit fluids, talk with your doctor before you increase your fluid intake.)  · Urinate when you need to. · Urinate right after you have sex. · Change sanitary pads often. · Avoid douches, bubble baths, feminine hygiene sprays, and other feminine hygiene products that have deodorants. · After going to the bathroom, wipe from front to back. When should you call for help? Call your doctor now or seek immediate medical care if:    · Symptoms such as fever, chills, nausea, or vomiting get worse or appear for the first time.     · You have new pain in your back just below your rib cage. This is called flank pain.     · There is new blood or pus in your urine.     · You have any problems with your antibiotic medicine.    Watch closely for changes in your health, and be sure to contact your doctor if:    · You are not getting better after taking an antibiotic for 2 days.     · Your symptoms go away but then come back. Where can you learn more? Go to http://tavia-dontrell.info/. Enter P449 in the search box to learn more about \"Urinary Tract Infection in Women: Care Instructions. \"  Current as of: March 20, 2018  Content Version: 11.9  © 6850-5315 [employer], [email or web address]. Care instructions adapted under license by [employer] (which disclaims liability or warranty for this information). If you have questions about a medical condition or this instruction, always ask your healthcare professional. [place] [address] any warranty or liability for your use of this information.

## 2019-06-17 NOTE — LETTER
[address] [place] [address] Dr [facility] EMERGENCY DEPT 
[address] [facility] [address] [phone] Work/School Note Date: 6/17/2019 To Whom It May concern: 
 
Cheli Brooke was seen and treated today in the emergency room by the following provider(s): 
Attending Provider: Jimi Rice DO Physician Assistant: ABDOUL Chew. Cheli Brooke may return to work on 6/19/19.  
 
Sincerely, 
 
 
 
 
ABDOUL Lewis

## 2019-06-17 NOTE — ED NOTES
[address] [place] [address] reviewed discharge instructions with the patient. The patient verbalized understanding.

## 2019-06-18 LAB
BACTERIA SPEC CULT: NORMAL
SERVICE CMNT-IMP: NORMAL

## 2019-06-24 ENCOUNTER — HOSPITAL ENCOUNTER (EMERGENCY)
Age: 24
Discharge: HOME | End: 2019-06-24
Attending: EMERGENCY MEDICINE
Payer: MEDICAID

## 2019-06-24 VITALS
DIASTOLIC BLOOD PRESSURE: 92 MMHG | WEIGHT: 260 LBS | OXYGEN SATURATION: 99 % | RESPIRATION RATE: 18 BRPM | BODY MASS INDEX: 44.39 KG/M2 | TEMPERATURE: 97.8 F | HEIGHT: 64 IN | SYSTOLIC BLOOD PRESSURE: 145 MMHG | HEART RATE: 97 BPM

## 2019-06-24 DIAGNOSIS — R42 DIZZINESS: Primary | ICD-10-CM

## 2019-06-24 DIAGNOSIS — F41.1 ANXIETY STATE: ICD-10-CM

## 2019-06-24 LAB
ALBUMIN SERPL-MCNC: 3.7 G/DL (ref 3.4–5)
ALBUMIN/GLOB SERPL: 0.7 {RATIO} (ref 0.8–1.7)
ALP SERPL-CCNC: 101 U/L (ref 45–117)
ALT SERPL-CCNC: 25 U/L (ref 13–56)
ANION GAP SERPL CALC-SCNC: 5 MMOL/L (ref 3–18)
APPEARANCE UR: ABNORMAL
AST SERPL-CCNC: 17 U/L (ref 15–37)
BACTERIA URNS QL MICRO: NEGATIVE /HPF
BASOPHILS # BLD: 0 K/UL (ref 0–0.1)
BASOPHILS NFR BLD: 0 % (ref 0–2)
BILIRUB SERPL-MCNC: 0.3 MG/DL (ref 0.2–1)
BILIRUB UR QL: NEGATIVE
BUN SERPL-MCNC: 9 MG/DL (ref 7–18)
BUN/CREAT SERPL: 9 (ref 12–20)
CALCIUM SERPL-MCNC: 10.3 MG/DL (ref 8.5–10.1)
CHLORIDE SERPL-SCNC: 107 MMOL/L (ref 100–108)
CO2 SERPL-SCNC: 26 MMOL/L (ref 21–32)
COLOR UR: ABNORMAL
CREAT SERPL-MCNC: 0.99 MG/DL (ref 0.6–1.3)
DIFFERENTIAL METHOD BLD: ABNORMAL
EOSINOPHIL # BLD: 0.2 K/UL (ref 0–0.4)
EOSINOPHIL NFR BLD: 2 % (ref 0–5)
EPITH CASTS URNS QL MICRO: ABNORMAL /LPF (ref 0–5)
ERYTHROCYTE [DISTWIDTH] IN BLOOD BY AUTOMATED COUNT: 15.2 % (ref 11.6–14.5)
GLOBULIN SER CALC-MCNC: 5.5 G/DL (ref 2–4)
GLUCOSE SERPL-MCNC: 85 MG/DL (ref 74–99)
GLUCOSE UR STRIP.AUTO-MCNC: NEGATIVE MG/DL
HCG UR QL: NEGATIVE
HCT VFR BLD AUTO: 37.6 % (ref 35–45)
HGB BLD-MCNC: 12.8 G/DL (ref 12–16)
HGB UR QL STRIP: NEGATIVE
KETONES UR QL STRIP.AUTO: NEGATIVE MG/DL
LEUKOCYTE ESTERASE UR QL STRIP.AUTO: ABNORMAL
LYMPHOCYTES # BLD: 4.1 K/UL (ref 0.9–3.6)
LYMPHOCYTES NFR BLD: 29 % (ref 21–52)
MCH RBC QN AUTO: 27.7 PG (ref 24–34)
MCHC RBC AUTO-ENTMCNC: 34 G/DL (ref 31–37)
MCV RBC AUTO: 81.4 FL (ref 74–97)
MONOCYTES # BLD: 0.8 K/UL (ref 0.05–1.2)
MONOCYTES NFR BLD: 6 % (ref 3–10)
NEUTS SEG # BLD: 9.2 K/UL (ref 1.8–8)
NEUTS SEG NFR BLD: 63 % (ref 40–73)
NITRITE UR QL STRIP.AUTO: NEGATIVE
PH UR STRIP: 8 [PH] (ref 5–8)
PLATELET # BLD AUTO: 414 K/UL (ref 135–420)
PMV BLD AUTO: 9.2 FL (ref 9.2–11.8)
POTASSIUM SERPL-SCNC: 4.3 MMOL/L (ref 3.5–5.5)
PROT SERPL-MCNC: 9.2 G/DL (ref 6.4–8.2)
PROT UR STRIP-MCNC: NEGATIVE MG/DL
RBC # BLD AUTO: 4.62 M/UL (ref 4.2–5.3)
RBC #/AREA URNS HPF: NEGATIVE /HPF (ref 0–5)
SODIUM SERPL-SCNC: 138 MMOL/L (ref 136–145)
SP GR UR REFRACTOMETRY: 1.02 (ref 1–1.03)
UROBILINOGEN UR QL STRIP.AUTO: 1 EU/DL (ref 0.2–1)
WBC # BLD AUTO: 14.4 K/UL (ref 4.6–13.2)
WBC URNS QL MICRO: ABNORMAL /HPF (ref 0–4)
YEAST BUDDING URNS QL: POSITIVE
YEAST URNS QL MICRO: ABNORMAL

## 2019-06-24 PROCEDURE — 81025 URINE PREGNANCY TEST: CPT

## 2019-06-24 PROCEDURE — 81001 URINALYSIS AUTO W/SCOPE: CPT

## 2019-06-24 PROCEDURE — 80053 COMPREHEN METABOLIC PANEL: CPT

## 2019-06-24 PROCEDURE — 99284 EMERGENCY DEPT VISIT MOD MDM: CPT

## 2019-06-24 PROCEDURE — 93005 ELECTROCARDIOGRAM TRACING: CPT

## 2019-06-24 PROCEDURE — 85025 COMPLETE CBC W/AUTO DIFF WBC: CPT

## 2019-06-24 NOTE — ED NOTES
I performed a brief evaluation, including history and physical, of the patient here in triage and I have determined that pt will need further treatment and evaluation from the main side ER physician. I have placed initial orders to help in expediting patients care.      June 24, 2019 at 6:03 PM - Karla Ann PA-C        Visit Vitals  BP (!) 145/92 (BP 1 Location: Right arm, BP Patient Position: Sitting)   Pulse 97   Temp 97.8 °F (36.6 °C)   Resp 18   Ht 5' 4\" (1.626 m)   Wt 117.9 kg (260 lb)   SpO2 99%   BMI 44.63 kg/m²

## 2019-06-24 NOTE — ED TRIAGE NOTES
Pt arrived through triage with c/o intermittent dizziness x \"a couple of months. \" Pt states her mother passed away a month ago and now she thinks she is having anxiety.

## 2019-06-25 LAB
ATRIAL RATE: 79 BPM
CALCULATED P AXIS, ECG09: 6 DEGREES
CALCULATED R AXIS, ECG10: 45 DEGREES
CALCULATED T AXIS, ECG11: 0 DEGREES
DIAGNOSIS, 93000: NORMAL
P-R INTERVAL, ECG05: 118 MS
Q-T INTERVAL, ECG07: 364 MS
QRS DURATION, ECG06: 84 MS
QTC CALCULATION (BEZET), ECG08: 417 MS
VENTRICULAR RATE, ECG03: 79 BPM

## 2019-06-25 NOTE — ED NOTES
Debbie Olivas NP reviewed discharge instructions with the patient. The patient verbalized understanding.

## 2019-06-25 NOTE — ED PROVIDER NOTES
Patient with a history of increased stress after losing her mother 1 month ago in a tragic accident presents to the emergency department with intermittent on and off dizziness for the past month since she lost her mother. Patient thinks it may be anxiety. No chest pain no shortness of breath no headache no blurred vision reported during episodes. Patient is not complaining of any lightheadedness or dizziness at this time. The history is provided by the patient. No  was used. Dizziness    This is a recurrent problem. The current episode started more than 1 week ago. The problem has not changed since onset. There was no loss of consciousness. The problem is associated with normal activity. Associated symptoms include dizziness and light-headedness. Pertinent negatives include no visual change, no chest pain, no palpitations, no confusion, no fever, no malaise/fatigue, no abdominal pain, no bowel incontinence, no bladder incontinence, no congestion, no headaches, no back pain, no focal weakness, no weakness and no head injury. She has tried nothing for the symptoms.         Past Medical History:   Diagnosis Date    Anxiety and depression     Asthma     Hidradenitis suppurativa     Menorrhagia     Migraine     Tobacco abuse     Started around age 15 years       Past Surgical History:   Procedure Laterality Date    DRAIN PILONIDAL CYST COMPLIC  9-26-98    Dr. Wilber Steiner HX OTHER SURGICAL      excision pilonidal cyst    REMV PILONIDAL LESION EXTENS N/A 10/21/2016    Dr. Raymundo Rutledge         Family History:   Problem Relation Age of Onset    Hypertension Mother     Other Mother         anxiety, fibromyalgia    Cancer Paternal Grandmother 54        ovarian       Social History     Socioeconomic History    Marital status: SINGLE     Spouse name: Not on file    Number of children: Not on file    Years of education: Not on file    Highest education level: Not on file   Occupational History  Not on file   Social Needs    Financial resource strain: Not on file    Food insecurity:     Worry: Not on file     Inability: Not on file    Transportation needs:     Medical: Not on file     Non-medical: Not on file   Tobacco Use    Smoking status: Current Every Day Smoker     Packs/day: 0.25     Types: Cigarettes     Start date: 1/1/2009    Smokeless tobacco: Never Used   Substance and Sexual Activity    Alcohol use: Yes     Alcohol/week: 1.2 oz     Types: 2 Shots of liquor per week     Comment: rarely    Drug use: No    Sexual activity: Yes     Partners: Male     Birth control/protection: None   Lifestyle    Physical activity:     Days per week: Not on file     Minutes per session: Not on file    Stress: Not on file   Relationships    Social connections:     Talks on phone: Not on file     Gets together: Not on file     Attends Confucianist service: Not on file     Active member of club or organization: Not on file     Attends meetings of clubs or organizations: Not on file     Relationship status: Not on file    Intimate partner violence:     Fear of current or ex partner: Not on file     Emotionally abused: Not on file     Physically abused: Not on file     Forced sexual activity: Not on file   Other Topics Concern    Not on file   Social History Narrative    Not on file         ALLERGIES: Amoxicillin and Codeine    Review of Systems   Constitutional: Negative for fever and malaise/fatigue. HENT: Negative for congestion. Respiratory: Negative for chest tightness. Cardiovascular: Negative for chest pain and palpitations. Gastrointestinal: Negative for abdominal pain and bowel incontinence. Genitourinary: Negative for bladder incontinence. Musculoskeletal: Negative for back pain. Skin: Negative for color change. Neurological: Positive for dizziness and light-headedness. Negative for focal weakness, weakness and headaches. Psychiatric/Behavioral: Negative for confusion.    All other systems reviewed and are negative. Vitals:    06/24/19 1655   BP: (!) 145/92   Pulse: 97   Resp: 18   Temp: 97.8 °F (36.6 °C)   SpO2: 99%   Weight: 117.9 kg (260 lb)   Height: 5' 4\" (1.626 m)            Physical Exam   Constitutional: She is oriented to person, place, and time. She appears well-developed and well-nourished. HENT:   Head: Normocephalic and atraumatic. Eyes: Pupils are equal, round, and reactive to light. Conjunctivae and EOM are normal.   Neck: Normal range of motion. Neck supple. Cardiovascular: Normal rate and regular rhythm. Pulmonary/Chest: Effort normal and breath sounds normal.   Abdominal: Soft. Bowel sounds are normal.   Musculoskeletal: Normal range of motion. Neurological: She is alert and oriented to person, place, and time. She has normal strength and normal reflexes. No cranial nerve deficit or sensory deficit. She displays a negative Romberg sign. GCS eye subscore is 4. GCS verbal subscore is 5. GCS motor subscore is 6. Skin: Skin is warm and dry. Psychiatric: She has a normal mood and affect. Her behavior is normal. Judgment and thought content normal.   Nursing note and vitals reviewed. MDM  Number of Diagnoses or Management Options  Anxiety state: established and improving  Dizziness: established and improving  Diagnosis management comments: Labs are negative EKG negative. On UA moderate leukocytes but patient states she is on antibiotics for urinary tract infection. When discussing what patient has been through in the last month I suspect that patient is experiencing anxiety reaction or anxiety state related to the loss of her mother recently and increased stress. No acute illness is suspected at this time. Patient will be discharged home to follow-up with primary care I will refer her to [address] [place] [address] Keith Hatch I also refer her to [facility] for mental health resources. And gave her some counseling resources.   Patient does not want a mental health evaluation tonight she denies any suicidal or homicidal thoughts. Amount and/or Complexity of Data Reviewed  Clinical lab tests: ordered and reviewed  Review and summarize past medical records: yes  Independent visualization of images, tracings, or specimens: yes    Risk of Complications, Morbidity, and/or Mortality  Presenting problems: low  Diagnostic procedures: low  Management options: low    Patient Progress  Patient progress: improved         Procedures          Vitals:  Patient Vitals for the past 12 hrs:   Temp Pulse Resp BP SpO2   06/24/19 1655 97.8 °F (36.6 °C) 97 18 (!) 145/92 99 %       Medications ordered:   Medications - No data to display      Lab findings:  Recent Results (from the past 12 hour(s))   EKG, 12 LEAD, INITIAL    Collection Time: 06/24/19  5:32 PM   Result Value Ref Range    Ventricular Rate 79 BPM    Atrial Rate 79 BPM    P-R Interval 118 ms    QRS Duration 84 ms    Q-T Interval 364 ms    QTC Calculation (Bezet) 417 ms    Calculated P Axis 6 degrees    Calculated R Axis 45 degrees    Calculated T Axis 0 degrees    Diagnosis       Normal sinus rhythm  Normal ECG  No previous ECGs available     CBC WITH AUTOMATED DIFF    Collection Time: 06/24/19  5:46 PM   Result Value Ref Range    WBC 14.4 (H) 4.6 - 13.2 K/uL    RBC 4.62 4.20 - 5.30 M/uL    HGB 12.8 12.0 - 16.0 g/dL    HCT 37.6 35.0 - 45.0 %    MCV 81.4 74.0 - 97.0 FL    MCH 27.7 24.0 - 34.0 PG    MCHC 34.0 31.0 - 37.0 g/dL    RDW 15.2 (H) 11.6 - 14.5 %    PLATELET 063 363 - 252 K/uL    MPV 9.2 9.2 - 11.8 FL    NEUTROPHILS 63 40 - 73 %    LYMPHOCYTES 29 21 - 52 %    MONOCYTES 6 3 - 10 %    EOSINOPHILS 2 0 - 5 %    BASOPHILS 0 0 - 2 %    ABS. NEUTROPHILS 9.2 (H) 1.8 - 8.0 K/UL    ABS. LYMPHOCYTES 4.1 (H) 0.9 - 3.6 K/UL    ABS. MONOCYTES 0.8 0.05 - 1.2 K/UL    ABS. EOSINOPHILS 0.2 0.0 - 0.4 K/UL    ABS.  BASOPHILS 0.0 0.0 - 0.1 K/UL    DF AUTOMATED     METABOLIC PANEL, COMPREHENSIVE    Collection Time: 06/24/19  5:46 PM   Result Value Ref Range    Sodium 138 136 - 145 mmol/L    Potassium 4.3 3.5 - 5.5 mmol/L    Chloride 107 100 - 108 mmol/L    CO2 26 21 - 32 mmol/L    Anion gap 5 3.0 - 18 mmol/L    Glucose 85 74 - 99 mg/dL    BUN 9 7.0 - 18 MG/DL    Creatinine 0.99 0.6 - 1.3 MG/DL    BUN/Creatinine ratio 9 (L) 12 - 20      GFR est AA >60 >60 ml/min/1.73m2    GFR est non-AA >60 >60 ml/min/1.73m2    Calcium 10.3 (H) 8.5 - 10.1 MG/DL    Bilirubin, total 0.3 0.2 - 1.0 MG/DL    ALT (SGPT) 25 13 - 56 U/L    AST (SGOT) 17 15 - 37 U/L    Alk. phosphatase 101 45 - 117 U/L    Protein, total 9.2 (H) 6.4 - 8.2 g/dL    Albumin 3.7 3.4 - 5.0 g/dL    Globulin 5.5 (H) 2.0 - 4.0 g/dL    A-G Ratio 0.7 (L) 0.8 - 1.7     URINALYSIS W/ RFLX MICROSCOPIC    Collection Time: 06/24/19  6:27 PM   Result Value Ref Range    Color DARK YELLOW      Appearance CLOUDY      Specific gravity 1.021 1.005 - 1.030      pH (UA) 8.0 5.0 - 8.0      Protein NEGATIVE  NEG mg/dL    Glucose NEGATIVE  NEG mg/dL    Ketone NEGATIVE  NEG mg/dL    Bilirubin NEGATIVE  NEG      Blood NEGATIVE  NEG      Urobilinogen 1.0 0.2 - 1.0 EU/dL    Nitrites NEGATIVE  NEG      Leukocyte Esterase MODERATE (A) NEG     HCG URINE, QL    Collection Time: 06/24/19  6:27 PM   Result Value Ref Range    HCG urine, QL NEGATIVE  NEG     URINE MICROSCOPIC ONLY    Collection Time: 06/24/19  6:27 PM   Result Value Ref Range    WBC 0 to 3 0 - 4 /hpf    RBC NEGATIVE  0 - 5 /hpf    Epithelial cells 3+ 0 - 5 /lpf    Bacteria NEGATIVE  NEG /hpf    Yeast 2+ (A) NEG    Budding yeast POSITIVE (A) NEG              Disposition:    Diagnosis:   1. Dizziness    2.  Anxiety state        Disposition: to Home      Follow-up Information     Follow up With Specialties Details Why Contact Info    [address]  In 2 days  [facility]  [facility]  In 2 days  [address] [address] [phone]           Discharge Medication List as of 6/24/2019  8:01 PM      CONTINUE these medications which have NOT CHANGED    Details   ondansetron (ZOFRAN ODT) 4 mg disintegrating tablet Take 1-2 tablets every 6-8 hours as needed for nausea and vomiting., Print, Disp-10 Tab, R-0      FLUoxetine (PROZAC) 20 mg capsule Take 1 Cap by mouth daily. , Normal, Disp-30 Cap, R-3      loratadine (CLARITIN) 10 mg tablet Take 1 Tab by mouth daily. , Print, Disp-100 Tab, R-0      fluticasone (FLONASE) 50 mcg/actuation nasal spray 2 Sprays by Both Nostrils route daily. , Print, Disp-1 Bottle, R-0             Return to the ER if you are unable to obtain referral as directed. Chloé Mcdowell's  results have been reviewed with her. She has been counseled regarding her diagnosis, treatment, and plan. She verbally conveys understanding and agreement of the signs, symptoms, diagnosis, treatment and prognosis and additionally agrees to follow up as discussed. She also agrees with the care-plan and conveys that all of her questions have been answered. I have also provided discharge instructions for her that include: educational information regarding their diagnosis and treatment, and list of reasons why they would want to return to the ED prior to their follow-up appointment, should her condition change. Inna GOMEZP-C,FNP-C      Dragon voice recognition was used to generate this report, which may have resulted in some phonetic based errors in grammar and contents.  Even though attempts were made to correct all the mistakes, some may have been missed, and remained in the body of the document

## 2019-07-10 ENCOUNTER — HOSPITAL ENCOUNTER (EMERGENCY)
Age: 24
Discharge: HOME | End: 2019-07-10
Attending: EMERGENCY MEDICINE
Payer: MEDICAID

## 2019-07-10 VITALS
OXYGEN SATURATION: 99 % | WEIGHT: 260 LBS | DIASTOLIC BLOOD PRESSURE: 97 MMHG | SYSTOLIC BLOOD PRESSURE: 181 MMHG | HEART RATE: 119 BPM | RESPIRATION RATE: 18 BRPM | HEIGHT: 64 IN | BODY MASS INDEX: 44.39 KG/M2 | TEMPERATURE: 97.5 F

## 2019-07-10 DIAGNOSIS — L73.2 HIDRADENITIS SUPPURATIVA: Primary | ICD-10-CM

## 2019-07-10 DIAGNOSIS — Z78.9 BODY PIERCING: ICD-10-CM

## 2019-07-10 DIAGNOSIS — B37.49 YEAST UTI: ICD-10-CM

## 2019-07-10 LAB
APPEARANCE UR: ABNORMAL
BACTERIA URNS QL MICRO: ABNORMAL /HPF
BILIRUB UR QL: NEGATIVE
COLOR UR: YELLOW
EPITH CASTS URNS QL MICRO: ABNORMAL /LPF (ref 0–5)
GLUCOSE UR STRIP.AUTO-MCNC: NEGATIVE MG/DL
HCG UR QL: NEGATIVE
HGB UR QL STRIP: ABNORMAL
KETONES UR QL STRIP.AUTO: ABNORMAL MG/DL
LEUKOCYTE ESTERASE UR QL STRIP.AUTO: ABNORMAL
NITRITE UR QL STRIP.AUTO: NEGATIVE
PH UR STRIP: 7.5 [PH] (ref 5–8)
PROT UR STRIP-MCNC: 30 MG/DL
RBC #/AREA URNS HPF: ABNORMAL /HPF (ref 0–5)
SP GR UR REFRACTOMETRY: 1.02 (ref 1–1.03)
UROBILINOGEN UR QL STRIP.AUTO: 0.2 EU/DL (ref 0.2–1)
WBC URNS QL MICRO: ABNORMAL /HPF (ref 0–4)
YEAST URNS QL MICRO: ABNORMAL

## 2019-07-10 PROCEDURE — 81001 URINALYSIS AUTO W/SCOPE: CPT

## 2019-07-10 PROCEDURE — 74011250637 HC RX REV CODE- 250/637: Performed by: PHYSICIAN ASSISTANT

## 2019-07-10 PROCEDURE — 81025 URINE PREGNANCY TEST: CPT

## 2019-07-10 PROCEDURE — 99284 EMERGENCY DEPT VISIT MOD MDM: CPT

## 2019-07-10 RX ORDER — DOXYCYCLINE 100 MG/1
100 CAPSULE ORAL 2 TIMES DAILY
Qty: 60 CAP | Refills: 0 | Status: SHIPPED | OUTPATIENT
Start: 2019-07-10 | End: 2019-08-09

## 2019-07-10 RX ORDER — FLUCONAZOLE 100 MG/1
150 TABLET ORAL
Status: COMPLETED | OUTPATIENT
Start: 2019-07-10 | End: 2019-07-10

## 2019-07-10 RX ORDER — FLUCONAZOLE 150 MG/1
150 TABLET ORAL
Qty: 1 TAB | Refills: 0 | Status: SHIPPED | OUTPATIENT
Start: 2019-07-10 | End: 2019-07-10

## 2019-07-10 RX ORDER — ONDANSETRON 8 MG/1
8 TABLET, ORALLY DISINTEGRATING ORAL
Status: COMPLETED | OUTPATIENT
Start: 2019-07-10 | End: 2019-07-10

## 2019-07-10 RX ADMIN — ONDANSETRON 8 MG: 8 TABLET, ORALLY DISINTEGRATING ORAL at 18:37

## 2019-07-10 RX ADMIN — FLUCONAZOLE 150 MG: 100 TABLET ORAL at 18:37

## 2019-07-10 NOTE — ED TRIAGE NOTES
\"I'm really going through a lot, I have an abscess on my inner thigh, I'm nauseated, and dizzy.  I don't know if I'm pregnant\"

## 2019-07-10 NOTE — DISCHARGE INSTRUCTIONS
Patient Education        Hidradenitis Suppurativa: Care Instructions  Your Care Instructions    Hidradenitis suppurativa (say \"tkj-spyu-yx-NY-tus sup-yur-uh-TY-vuh\") is a skin condition that causes lumps on the skin that look like pimples or boils. The lumps are usually painful and can break open and drain blood and bad-smelling pus. The condition can come and go for many years. Treatment for this condition may include antibiotics and other medicines. You may need surgery to remove the lumps. Home care includes wearing loose-fitting clothes and washing the area gently. You can help prevent lumps from coming back by staying at a healthy weight and not smoking. Doctors don't know exactly how this condition starts. But they do know that something irritates and inflames the hair follicles, causing them to swell and form lumps. This skin condition can't be spread from person to person (isn't contagious). Follow-up care is a key part of your treatment and safety. Be sure to make and go to all appointments, and call your doctor if you are having problems. It's also a good idea to know your test results and keep a list of the medicines you take. How can you care for yourself at home?  [facility] care    · Wash the area every day with mild soap. Use your hands rather than a washcloth or sponge when you wash that part of your body.     · Leave the affected areas uncovered when you can. If you have lumps that are draining, you can cover them with a bandage or other dressing. Put petroleum jelly (such as Vaseline) on the dressing to help keep it from sticking.     · Wear-loose fitting clothes that don't rub against the area. Avoid activities that cause skin to rub together.     · If you have pain, try a warm compress. Soak a towel or washcloth in warm water, wring it out, and place it on the affected skin for about 10 minutes. Medicines    · Be safe with medicines. Take your medicines exactly as prescribed.  Call your doctor if you think you are having a problem with your medicine. You will get more details on the specific medicines your doctor prescribes.     · If your doctor prescribed antibiotics, take them as directed. Do not stop taking them just because you feel better. You need to take the full course of antibiotics.    Lifestyle choices    · If you smoke, think about quitting. Smoking can make the condition worse. If you need help quitting, talk to your doctor about stop-smoking programs and medicines. These can increase your chances of quitting for good.     · Stay at a healthy weight, or lose weight, by eating healthy foods and being physically active. Being overweight could make this condition worse. When should you call for help? Call your doctor now or seek immediate medical care if:    · You have symptoms of infection, such as:  ? Increased pain, swelling, warmth, or redness. ? Red streaks leading from the area. ? Pus draining from the area. ? A fever.    Watch closely for changes in your health, and be sure to contact your doctor if:    · You do not get better as expected. Where can you learn more? Go to http://tavia-dontrell.info/. Enter U460 in the search box to learn more about \"Hidradenitis Suppurativa: Care Instructions. \"  Current as of: April 17, 2018  Content Version: 11.9  © 9011-4604 [employer]. Care instructions adapted under license by [employer] (which disclaims liability or warranty for this information). If you have questions about a medical condition or this instruction, always ask your healthcare professional. [place] [address] any warranty or liability for your use of this information.

## 2019-07-10 NOTE — ED PROVIDER NOTES
EMERGENCY DEPARTMENT HISTORY AND PHYSICAL EXAM    Date: 7/10/2019  Patient Name: Pat Hernandez    History of Presenting Illness     Chief Complaint   Patient presents with    Nausea    Skin Problem    Dizziness         History Provided By: Patient     Chief Complaint: abscess      Additional History (Context): Pat Hernandez is a 21 y.o. female with history of severe Hidradenitis Suppurativa who presents with \"dizziness, nausea and an abscess that she would like drained. \" She states that she abruptly stopped taking her Humira in May after taking it for 6 months, and after this began to have intermittent dizziness and nausea. She was put on Humira by her dermatologist to treat her Hidradenitis Suppurativa but stopped it abruptly on her own as she felt it was not working. Advised patient to talk with her dermatologist/let them know that she abruptly stopped her medication. She has an abscess on her left upper inner thigh that unroofed and drained on its own. She denies fevers, chills, sweats and vomiting. PCP: None    Current Outpatient Medications   Medication Sig Dispense Refill    doxycycline (MONODOX) 100 mg capsule Take 1 Cap by mouth two (2) times a day for 30 days. Indications: hidradenitis supperativa 60 Cap 0    fluconazole (DIFLUCAN) 150 mg tablet Take 1 Tab by mouth now for 1 dose. FDA advises cautious prescribing of oral fluconazole in pregnancy. 1 Tab 0    ondansetron (ZOFRAN ODT) 4 mg disintegrating tablet Take 1-2 tablets every 6-8 hours as needed for nausea and vomiting. 10 Tab 0    FLUoxetine (PROZAC) 20 mg capsule Take 1 Cap by mouth daily. 30 Cap 3    loratadine (CLARITIN) 10 mg tablet Take 1 Tab by mouth daily. 100 Tab 0    fluticasone (FLONASE) 50 mcg/actuation nasal spray 2 Sprays by Both Nostrils route daily.  1 Bottle 0       Past History     Past Medical History:  Past Medical History:   Diagnosis Date    Anxiety and depression     Asthma     Hidradenitis suppurativa  Menorrhagia     Migraine     Tobacco abuse     Started around age 15 years       Past Surgical History:  Past Surgical History:   Procedure Laterality Date    DRAIN PILONIDAL CYST COMPLIC  4-59-36    Dr. Liseth Ryan    HX OTHER SURGICAL      excision pilonidal cyst    REMV PILONIDAL LESION EXTENS N/A 10/21/2016    Dr. Liseth Ryan       Family History:  Family History   Problem Relation Age of Onset    Hypertension Mother     Other Mother         anxiety, fibromyalgia    Cancer Paternal Grandmother 54        ovarian       Social History:  Social History     Tobacco Use    Smoking status: Current Every Day Smoker     Packs/day: 0.25     Types: Cigarettes     Start date: 1/1/2009    Smokeless tobacco: Never Used   Substance Use Topics    Alcohol use: Yes     Alcohol/week: 1.2 oz     Types: 2 Shots of liquor per week     Comment: rarely    Drug use: No       Allergies: Allergies   Allergen Reactions    Amoxicillin Rash    Codeine Other (comments)     \"mean\"           Review of Systems   Review of Systems   Constitutional: Negative for chills and fever. Respiratory: Negative for shortness of breath. Cardiovascular: Negative for chest pain. Gastrointestinal: Positive for nausea. Negative for abdominal pain and vomiting. Skin: Positive for rash (severe Hidrananitis Suppurativa ). Neurological: Positive for dizziness. All Other Systems Negative  Physical Exam     Vitals:    07/10/19 1605 07/10/19 1700   BP: (!) 181/97    Pulse: (!) 119    Resp: 18    Temp: 97.5 °F (36.4 °C)    SpO2: 99% 99%   Weight: 117.9 kg (260 lb)    Height: 5' 4\" (1.626 m)      Physical Exam   Constitutional: She is oriented to person, place, and time. She appears well-developed and well-nourished. She is cooperative. No distress. HENT:   Head: Normocephalic and atraumatic. Eyes: Pupils are equal, round, and reactive to light. Conjunctivae are normal.   Neck: Normal range of motion. Neck supple.    Neurological: She is alert and oriented to person, place, and time. Skin: Skin is warm and dry. She is not diaphoretic. Unroofed abscess, healing 1.5 cm. Severe Hidradenitis Suppurativa B/L axillae, in breast skin folds, inner upper thighs    Psychiatric: She has a normal mood and affect. Her behavior is normal.            Diagnostic Study Results     Labs -     Recent Results (from the past 12 hour(s))   URINALYSIS W/ RFLX MICROSCOPIC    Collection Time: 07/10/19  4:43 PM   Result Value Ref Range    Color YELLOW      Appearance TURBID      Specific gravity 1.024 1.005 - 1.030      pH (UA) 7.5 5.0 - 8.0      Protein 30 (A) NEG mg/dL    Glucose NEGATIVE  NEG mg/dL    Ketone TRACE (A) NEG mg/dL    Bilirubin NEGATIVE  NEG      Blood SMALL (A) NEG      Urobilinogen 0.2 0.2 - 1.0 EU/dL    Nitrites NEGATIVE  NEG      Leukocyte Esterase LARGE (A) NEG     HCG URINE, QL    Collection Time: 07/10/19  4:43 PM   Result Value Ref Range    HCG urine, QL NEGATIVE  NEG     URINE MICROSCOPIC ONLY    Collection Time: 07/10/19  4:43 PM   Result Value Ref Range    WBC 5 to 9 0 - 4 /hpf    RBC 5 to 9 0 - 5 /hpf    Epithelial cells 4+ 0 - 5 /lpf    Bacteria FEW (A) NEG /hpf    Yeast 2+ (A) NEG       Radiologic Studies -   No orders to display     CT Results  (Last 48 hours)    None        CXR Results  (Last 48 hours)    None            Medical Decision Making   I am the first provider for this patient. I reviewed the vital signs, available nursing notes, past medical history, past surgical history, family history and social history. Vital Signs-Reviewed the patient's vital signs. Pulse Oximetry Analysis - 99% on RA    Records Reviewed: Nursing Notes    Procedures:  Procedures    Provider Notes (Medical Decision Making): pt with severe HS, was on humira which she d/c in may, has not f/u with gen surg. C/o left inner thigh draining abscess. Nothing to drain on exam. Will start on doxy and refer to gen surg. Diflucan for +yeast in UTI. Return precautions discussed w/ family. Pt appears non toxic, afebrile. ABDOUL Meadows 6:59 PM        MED RECONCILIATION:  No current facility-administered medications for this encounter. Current Outpatient Medications   Medication Sig    doxycycline (MONODOX) 100 mg capsule Take 1 Cap by mouth two (2) times a day for 30 days. Indications: hidradenitis supperativa    fluconazole (DIFLUCAN) 150 mg tablet Take 1 Tab by mouth now for 1 dose. FDA advises cautious prescribing of oral fluconazole in pregnancy.  ondansetron (ZOFRAN ODT) 4 mg disintegrating tablet Take 1-2 tablets every 6-8 hours as needed for nausea and vomiting.  FLUoxetine (PROZAC) 20 mg capsule Take 1 Cap by mouth daily.  loratadine (CLARITIN) 10 mg tablet Take 1 Tab by mouth daily.  fluticasone (FLONASE) 50 mcg/actuation nasal spray 2 Sprays by Both Nostrils route daily. Disposition:  home    DISCHARGE NOTE:     Pt has been reexamined. Patient has no new complaints, changes, or physical findings. Care plan outlined and precautions discussed. Results of lab were reviewed with the patient. All medications were reviewed with the patient; will d/c home with doxy and diflucan. All of pt's questions and concerns were addressed. Patient was instructed and agrees to follow up with pcp and gen surg, as well as to return to the ED upon further deterioration. Patient is ready to go home. Follow-up Information     Follow up With Specialties Details Why Contact Info    Shelby Garrett PA-C Physician Assistant  For follow-up [address] [place] [address] MD [facility]   [address] [place] [address]  [address]  [address] [place] [address] Av            Discharge Medication List as of 7/10/2019  6:30 PM      START taking these medications    Details   doxycycline (MONODOX) 100 mg capsule Take 1 Cap by mouth two (2) times a day for 30 days. Indications: hidradenitis supperativa, Normal, Disp-60 Cap, R-0      fluconazole (DIFLUCAN) 150 mg tablet Take 1 Tab by mouth now for 1 dose. FDA advises cautious prescribing of oral fluconazole in pregnancy. , Normal, Disp-1 Tab, R-0         CONTINUE these medications which have NOT CHANGED    Details   ondansetron (ZOFRAN ODT) 4 mg disintegrating tablet Take 1-2 tablets every 6-8 hours as needed for nausea and vomiting., Print, Disp-10 Tab, R-0      FLUoxetine (PROZAC) 20 mg capsule Take 1 Cap by mouth daily. , Normal, Disp-30 Cap, R-3      loratadine (CLARITIN) 10 mg tablet Take 1 Tab by mouth daily. , Print, Disp-100 Tab, R-0      fluticasone (FLONASE) 50 mcg/actuation nasal spray 2 Sprays by Both Nostrils route daily. , Print, Disp-1 Bottle, R-0               Diagnosis     Clinical Impression:   1. Hidradenitis suppurativa    2. Yeast UTI    3. Body piercing      I was personally available for consultation in the emergency department.  Lexis Archuleta MD

## 2019-08-08 ENCOUNTER — HOSPITAL ENCOUNTER (EMERGENCY)
Age: 24
Discharge: HOME | End: 2019-08-08
Attending: EMERGENCY MEDICINE
Payer: COMMERCIAL

## 2019-08-08 ENCOUNTER — APPOINTMENT (OUTPATIENT)
Dept: GENERAL RADIOLOGY | Age: 24
End: 2019-08-08
Attending: EMERGENCY MEDICINE
Payer: COMMERCIAL

## 2019-08-08 VITALS
HEART RATE: 90 BPM | RESPIRATION RATE: 16 BRPM | TEMPERATURE: 98.7 F | WEIGHT: 260 LBS | BODY MASS INDEX: 44.39 KG/M2 | OXYGEN SATURATION: 100 % | SYSTOLIC BLOOD PRESSURE: 146 MMHG | DIASTOLIC BLOOD PRESSURE: 89 MMHG | HEIGHT: 64 IN

## 2019-08-08 DIAGNOSIS — F41.1 ANXIETY STATE: Primary | ICD-10-CM

## 2019-08-08 LAB
ALBUMIN SERPL-MCNC: 3.3 G/DL (ref 3.4–5)
ALBUMIN/GLOB SERPL: 0.6 {RATIO} (ref 0.8–1.7)
ALP SERPL-CCNC: 101 U/L (ref 45–117)
ALT SERPL-CCNC: 19 U/L (ref 13–56)
ANION GAP SERPL CALC-SCNC: 9 MMOL/L (ref 3–18)
AST SERPL-CCNC: 9 U/L (ref 10–38)
ATRIAL RATE: 98 BPM
BASOPHILS # BLD: 0 K/UL (ref 0–0.1)
BASOPHILS NFR BLD: 0 % (ref 0–2)
BILIRUB SERPL-MCNC: 0.4 MG/DL (ref 0.2–1)
BUN SERPL-MCNC: 8 MG/DL (ref 7–18)
BUN/CREAT SERPL: 8 (ref 12–20)
CALCIUM SERPL-MCNC: 9.4 MG/DL (ref 8.5–10.1)
CALCULATED P AXIS, ECG09: 57 DEGREES
CALCULATED R AXIS, ECG10: 55 DEGREES
CALCULATED T AXIS, ECG11: 17 DEGREES
CHLORIDE SERPL-SCNC: 107 MMOL/L (ref 100–111)
CO2 SERPL-SCNC: 24 MMOL/L (ref 21–32)
CREAT SERPL-MCNC: 0.96 MG/DL (ref 0.6–1.3)
DIAGNOSIS, 93000: NORMAL
DIFFERENTIAL METHOD BLD: ABNORMAL
EOSINOPHIL # BLD: 0.2 K/UL (ref 0–0.4)
EOSINOPHIL NFR BLD: 1 % (ref 0–5)
ERYTHROCYTE [DISTWIDTH] IN BLOOD BY AUTOMATED COUNT: 14.8 % (ref 11.6–14.5)
GLOBULIN SER CALC-MCNC: 5.8 G/DL (ref 2–4)
GLUCOSE SERPL-MCNC: 91 MG/DL (ref 74–99)
HCT VFR BLD AUTO: 36.9 % (ref 35–45)
HGB BLD-MCNC: 12.4 G/DL (ref 12–16)
LYMPHOCYTES # BLD: 3.7 K/UL (ref 0.9–3.6)
LYMPHOCYTES NFR BLD: 25 % (ref 21–52)
MAGNESIUM SERPL-MCNC: 2.2 MG/DL (ref 1.6–2.6)
MCH RBC QN AUTO: 26.8 PG (ref 24–34)
MCHC RBC AUTO-ENTMCNC: 33.6 G/DL (ref 31–37)
MCV RBC AUTO: 79.7 FL (ref 74–97)
MONOCYTES # BLD: 0.8 K/UL (ref 0.05–1.2)
MONOCYTES NFR BLD: 5 % (ref 3–10)
NEUTS SEG # BLD: 9.8 K/UL (ref 1.8–8)
NEUTS SEG NFR BLD: 69 % (ref 40–73)
P-R INTERVAL, ECG05: 120 MS
PLATELET # BLD AUTO: 491 K/UL (ref 135–420)
PMV BLD AUTO: 8.9 FL (ref 9.2–11.8)
POTASSIUM SERPL-SCNC: 3.6 MMOL/L (ref 3.5–5.5)
PROT SERPL-MCNC: 9.1 G/DL (ref 6.4–8.2)
Q-T INTERVAL, ECG07: 336 MS
QRS DURATION, ECG06: 80 MS
QTC CALCULATION (BEZET), ECG08: 428 MS
RBC # BLD AUTO: 4.63 M/UL (ref 4.2–5.3)
SODIUM SERPL-SCNC: 140 MMOL/L (ref 136–145)
TROPONIN I SERPL-MCNC: <0.02 NG/ML (ref 0–0.04)
VENTRICULAR RATE, ECG03: 98 BPM
WBC # BLD AUTO: 14.4 K/UL (ref 4.6–13.2)

## 2019-08-08 PROCEDURE — 74011250636 HC RX REV CODE- 250/636: Performed by: NURSE PRACTITIONER

## 2019-08-08 PROCEDURE — 99283 EMERGENCY DEPT VISIT LOW MDM: CPT

## 2019-08-08 PROCEDURE — 93005 ELECTROCARDIOGRAM TRACING: CPT

## 2019-08-08 PROCEDURE — 96374 THER/PROPH/DIAG INJ IV PUSH: CPT

## 2019-08-08 PROCEDURE — 74011250637 HC RX REV CODE- 250/637: Performed by: EMERGENCY MEDICINE

## 2019-08-08 PROCEDURE — 80053 COMPREHEN METABOLIC PANEL: CPT

## 2019-08-08 PROCEDURE — 84484 ASSAY OF TROPONIN QUANT: CPT

## 2019-08-08 PROCEDURE — 71045 X-RAY EXAM CHEST 1 VIEW: CPT

## 2019-08-08 PROCEDURE — 85025 COMPLETE CBC W/AUTO DIFF WBC: CPT

## 2019-08-08 PROCEDURE — 83735 ASSAY OF MAGNESIUM: CPT

## 2019-08-08 RX ORDER — LORAZEPAM 2 MG/ML
1 INJECTION INTRAMUSCULAR
Status: DISCONTINUED | OUTPATIENT
Start: 2019-08-08 | End: 2019-08-08

## 2019-08-08 RX ORDER — GUAIFENESIN 100 MG/5ML
162 LIQUID (ML) ORAL
Status: COMPLETED | OUTPATIENT
Start: 2019-08-08 | End: 2019-08-08

## 2019-08-08 RX ORDER — LORAZEPAM 2 MG/ML
1 INJECTION INTRAMUSCULAR
Status: COMPLETED | OUTPATIENT
Start: 2019-08-08 | End: 2019-08-08

## 2019-08-08 RX ORDER — LORAZEPAM 0.5 MG/1
1 TABLET ORAL
Qty: 15 TAB | Refills: 0 | Status: SHIPPED | OUTPATIENT
Start: 2019-08-08 | End: 2022-01-13

## 2019-08-08 RX ADMIN — LORAZEPAM 1 MG: 2 INJECTION, SOLUTION INTRAMUSCULAR; INTRAVENOUS at 17:40

## 2019-08-08 RX ADMIN — ASPIRIN 81 MG 162 MG: 81 TABLET ORAL at 17:09

## 2019-08-08 NOTE — DISCHARGE INSTRUCTIONS

## 2019-08-08 NOTE — ED TRIAGE NOTES
The patient presents fore evaluation of chest pain with radiation to her right arm and shoulder x two days. She is severely anxious and tearful.

## 2019-08-09 NOTE — ED PROVIDER NOTES
Very patient presents emergency department with a medical history of asthma migraines and anxiety depression presents to the emergency department with chest pain the pain radiated to her right shoulder. At this point time the patient denies any pain. Patient denies any shortness of breath no fever has been reported no headache no dizziness patient states she is still upset from when she lost her mother several months ago. Patient has not gone to a mental health counselor since has not started on any anxiety medicine. I myself have seen this patient other times related to her anxiety related to losing her mother. The history is provided by the patient. No  was used. Anxiety   This is a recurrent problem. The current episode started 12 to 24 hours ago. The problem occurs constantly. The problem has been gradually improving. Pertinent negatives include no chest pain, no abdominal pain, no headaches and no shortness of breath. Nothing aggravates the symptoms. Nothing relieves the symptoms. She has tried nothing for the symptoms.         Past Medical History:   Diagnosis Date    Anxiety and depression     Asthma     Hidradenitis suppurativa     Menorrhagia     Migraine     Tobacco abuse     Started around age 15 years       Past Surgical History:   Procedure Laterality Date    DRAIN PILONIDAL CYST COMPLIC  2-40-94    Dr. Roberto Sheldon HX OTHER SURGICAL      excision pilonidal cyst    REMV PILONIDAL LESION EXTENS N/A 10/21/2016    Dr. Dani Dobbins         Family History:   Problem Relation Age of Onset    Hypertension Mother     Other Mother         anxiety, fibromyalgia    Cancer Paternal Grandmother 54        ovarian       Social History     Socioeconomic History    Marital status: SINGLE     Spouse name: Not on file    Number of children: Not on file    Years of education: Not on file    Highest education level: Not on file   Occupational History    Not on file   Social Needs    Financial resource strain: Not on file    Food insecurity:     Worry: Not on file     Inability: Not on file    Transportation needs:     Medical: Not on file     Non-medical: Not on file   Tobacco Use    Smoking status: Current Every Day Smoker     Packs/day: 0.25     Types: Cigarettes     Start date: 1/1/2009    Smokeless tobacco: Never Used   Substance and Sexual Activity    Alcohol use: Yes     Alcohol/week: 2.0 standard drinks     Types: 2 Shots of liquor per week     Comment: rarely    Drug use: No    Sexual activity: Yes     Partners: Male     Birth control/protection: None   Lifestyle    Physical activity:     Days per week: Not on file     Minutes per session: Not on file    Stress: Not on file   Relationships    Social connections:     Talks on phone: Not on file     Gets together: Not on file     Attends [employer] service: Not on file     Active member of club or organization: Not on file     Attends meetings of clubs or organizations: Not on file     Relationship status: Not on file    Intimate partner violence:     Fear of current or ex partner: Not on file     Emotionally abused: Not on file     Physically abused: Not on file     Forced sexual activity: Not on file   Other Topics Concern    Not on file   Social History Narrative    Not on file         ALLERGIES: Amoxicillin and Codeine    Review of Systems   Constitutional: Negative for fever. Respiratory: Negative for shortness of breath. Cardiovascular: Negative for chest pain. Gastrointestinal: Negative for abdominal pain. Neurological: Negative for headaches. Psychiatric/Behavioral: Negative for self-injury and suicidal ideas. The patient is nervous/anxious. All other systems reviewed and are negative.       Vitals:    08/08/19 1602 08/08/19 1830   BP: 146/89    Pulse: (!) 125 90   Resp: 20 16   Temp: 98.7 °F (37.1 °C)    SpO2: 100%    Weight: 117.9 kg (260 lb)    Height: 5' 4\" (1.626 m)             Physical Exam Constitutional: She is oriented to person, place, and time. She appears well-developed and well-nourished. HENT:   Head: Normocephalic and atraumatic. Eyes: Pupils are equal, round, and reactive to light. Conjunctivae and EOM are normal.   Neck: Normal range of motion. Neck supple. Cardiovascular: Normal rate and regular rhythm. Pulmonary/Chest: Effort normal and breath sounds normal.   Abdominal: Soft. Bowel sounds are normal.   Musculoskeletal: Normal range of motion. Neurological: She is alert and oriented to person, place, and time. She has normal reflexes. Skin: Skin is warm and dry. Psychiatric: Her speech is normal and behavior is normal. Judgment and thought content normal. Her mood appears anxious. Her affect is not angry. Cognition and memory are normal. She does not exhibit a depressed mood. Nursing note and vitals reviewed. MDM  Number of Diagnoses or Management Options  Anxiety state: minor  Diagnosis management comments: I suspect her symptoms are related to anxiety related to losing her mother. The  was called to the room and he counseled the patient he suggested counseling and put in a  consult for counselor. No other acute illness is reported or indicated. And patient denies any need for mental health evaluation at this time does not want to see mental health and denies any suicidal or homicidal thoughts.        Amount and/or Complexity of Data Reviewed  Clinical lab tests: ordered and reviewed  Review and summarize past medical records: yes  Independent visualization of images, tracings, or specimens: yes    Risk of Complications, Morbidity, and/or Mortality  Presenting problems: moderate  Diagnostic procedures: moderate  Management options: moderate    Patient Progress  Patient progress: improved         Procedures            Vitals:  Patient Vitals for the past 12 hrs:   Temp Pulse Resp BP SpO2   08/08/19 1830 -- 90 16 -- --   08/08/19 1602 98.7 °F (37.1 °C) (!) 125 20 146/89 100 %       Medications ordered:   Medications   aspirin chewable tablet 162 mg (162 mg Oral Given 8/8/19 1709)   LORazepam (ATIVAN) injection 1 mg (1 mg IntraVENous Given 8/8/19 1740)         Lab findings:  Recent Results (from the past 12 hour(s))   EKG, 12 LEAD, INITIAL    Collection Time: 08/08/19  4:09 PM   Result Value Ref Range    Ventricular Rate 98 BPM    Atrial Rate 98 BPM    P-R Interval 120 ms    QRS Duration 80 ms    Q-T Interval 336 ms    QTC Calculation (Bezet) 428 ms    Calculated P Axis 57 degrees    Calculated R Axis 55 degrees    Calculated T Axis 17 degrees    Diagnosis       Normal sinus rhythm  Normal ECG  When compared with ECG of 24-JUN-2019 17:32,  No significant change was found  Confirmed by Tash Neumann MD, Callie Hargrove ([id number]) on 8/8/2019 5:78:15 PM     METABOLIC PANEL, COMPREHENSIVE    Collection Time: 08/08/19  4:30 PM   Result Value Ref Range    Sodium 140 136 - 145 mmol/L    Potassium 3.6 3.5 - 5.5 mmol/L    Chloride 107 100 - 111 mmol/L    CO2 24 21 - 32 mmol/L    Anion gap 9 3.0 - 18 mmol/L    Glucose 91 74 - 99 mg/dL    BUN 8 7.0 - 18 MG/DL    Creatinine 0.96 0.6 - 1.3 MG/DL    BUN/Creatinine ratio 8 (L) 12 - 20      GFR est AA >60 >60 ml/min/1.73m2    GFR est non-AA >60 >60 ml/min/1.73m2    Calcium 9.4 8.5 - 10.1 MG/DL    Bilirubin, total 0.4 0.2 - 1.0 MG/DL    ALT (SGPT) 19 13 - 56 U/L    AST (SGOT) 9 (L) 10 - 38 U/L    Alk.  phosphatase 101 45 - 117 U/L    Protein, total 9.1 (H) 6.4 - 8.2 g/dL    Albumin 3.3 (L) 3.4 - 5.0 g/dL    Globulin 5.8 (H) 2.0 - 4.0 g/dL    A-G Ratio 0.6 (L) 0.8 - 1.7     CBC WITH AUTOMATED DIFF    Collection Time: 08/08/19  4:30 PM   Result Value Ref Range    WBC 14.4 (H) 4.6 - 13.2 K/uL    RBC 4.63 4.20 - 5.30 M/uL    HGB 12.4 12.0 - 16.0 g/dL    HCT 36.9 35.0 - 45.0 %    MCV 79.7 74.0 - 97.0 FL    MCH 26.8 24.0 - 34.0 PG    MCHC 33.6 31.0 - 37.0 g/dL    RDW 14.8 (H) 11.6 - 14.5 %    PLATELET 165 (H) 123 - 420 K/uL    MPV 8.9 (L) 9.2 - 11.8 FL    NEUTROPHILS 69 40 - 73 %    LYMPHOCYTES 25 21 - 52 %    MONOCYTES 5 3 - 10 %    EOSINOPHILS 1 0 - 5 %    BASOPHILS 0 0 - 2 %    ABS. NEUTROPHILS 9.8 (H) 1.8 - 8.0 K/UL    ABS. LYMPHOCYTES 3.7 (H) 0.9 - 3.6 K/UL    ABS. MONOCYTES 0.8 0.05 - 1.2 K/UL    ABS. EOSINOPHILS 0.2 0.0 - 0.4 K/UL    ABS. BASOPHILS 0.0 0.0 - 0.1 K/UL    DF AUTOMATED     TROPONIN I    Collection Time: 08/08/19  4:30 PM   Result Value Ref Range    Troponin-I, QT <0.02 0.0 - 0.045 NG/ML   MAGNESIUM    Collection Time: 08/08/19  4:30 PM   Result Value Ref Range    Magnesium 2.2 1.6 - 2.6 mg/dL           X-Ray, CT or other radiology findings or impressions:  XR CHEST SNGL V   Final Result   IMPRESSION:      Negative study. Disposition:    Diagnosis:   1. Anxiety state        Disposition: to Home      Follow-up Information     Follow up With Specialties Details Why Contact Info    [facility] EMERGENCY DEPT Emergency Medicine   [address] [place] [address] [id number] [place] [address] [address]  In 2 days  2010 [address] [place] [address]  [phone]           Discharge Medication List as of 8/8/2019  6:23 PM      START taking these medications    Details   LORazepam (ATIVAN) 0.5 mg tablet Take 2 Tabs by mouth two (2) times daily as needed for Anxiety. Max Daily Amount: 2 mg., Print, Disp-15 Tab, R-0         CONTINUE these medications which have NOT CHANGED    Details   doxycycline (MONODOX) 100 mg capsule Take 1 Cap by mouth two (2) times a day for 30 days. Indications: hidradenitis supperativa, Normal, Disp-60 Cap, R-0      ondansetron (ZOFRAN ODT) 4 mg disintegrating tablet Take 1-2 tablets every 6-8 hours as needed for nausea and vomiting., Print, Disp-10 Tab, R-0      FLUoxetine (PROZAC) 20 mg capsule Take 1 Cap by mouth daily. , Normal, Disp-30 Cap, R-3      loratadine (CLARITIN) 10 mg tablet Take 1 Tab by mouth daily. , Print, Disp-100 Tab, R-0      fluticasone (FLONASE) 50 mcg/actuation nasal spray 2 Sprays by Both Nostrils route daily. , Print, Disp-1 Bottle, R-0             Return to the ER if you are unable to obtain referral as directed. Chloé Mcdowell's  results have been reviewed with her. She has been counseled regarding her diagnosis, treatment, and plan. She verbally conveys understanding and agreement of the signs, symptoms, diagnosis, treatment and prognosis and additionally agrees to follow up as discussed. She also agrees with the care-plan and conveys that all of her questions have been answered. I have also provided discharge instructions for her that include: educational information regarding their diagnosis and treatment, and list of reasons why they would want to return to the ED prior to their follow-up appointment, should her condition change. Nehal Castro ENP-C,FNP-C      Dragon voice recognition was used to generate this report, which may have resulted in some phonetic based errors in grammar and contents.  Even though attempts were made to correct all the mistakes, some may have been missed, and remained in the body of the document

## 2019-08-09 NOTE — ED NOTES
received a note under the office door to contact patient regarding counseling. I called patient at 10:20am.  Patient unavailable. A voicemail left for patient with  contact information. A contact letter has been sent to patient address on file.

## 2019-09-13 ENCOUNTER — HOSPITAL ENCOUNTER (OUTPATIENT)
Dept: LAB | Age: 24
Discharge: HOME | End: 2019-09-13
Payer: MEDICAID

## 2019-09-13 ENCOUNTER — OFFICE VISIT (OUTPATIENT)
Dept: FAMILY MEDICINE CLINIC | Age: 24
End: 2019-09-13

## 2019-09-13 VITALS
TEMPERATURE: 98.6 F | DIASTOLIC BLOOD PRESSURE: 86 MMHG | HEART RATE: 118 BPM | OXYGEN SATURATION: 100 % | HEIGHT: 64 IN | WEIGHT: 238.4 LBS | BODY MASS INDEX: 40.7 KG/M2 | SYSTOLIC BLOOD PRESSURE: 136 MMHG | RESPIRATION RATE: 18 BRPM

## 2019-09-13 DIAGNOSIS — F41.1 ANXIETY STATE: ICD-10-CM

## 2019-09-13 DIAGNOSIS — Z23 ENCOUNTER FOR IMMUNIZATION: ICD-10-CM

## 2019-09-13 DIAGNOSIS — R53.82 CHRONIC FATIGUE: ICD-10-CM

## 2019-09-13 DIAGNOSIS — N92.6 MISSED PERIOD: ICD-10-CM

## 2019-09-13 DIAGNOSIS — R42 LIGHTHEADED: Primary | ICD-10-CM

## 2019-09-13 DIAGNOSIS — Z78.9 ATTEMPTING TO CONCEIVE: ICD-10-CM

## 2019-09-13 DIAGNOSIS — R42 LIGHTHEADED: ICD-10-CM

## 2019-09-13 LAB
HCG URINE, QL. (POC): NEGATIVE
VALID INTERNAL CONTROL?: YES

## 2019-09-13 PROCEDURE — 84439 ASSAY OF FREE THYROXINE: CPT

## 2019-09-13 PROCEDURE — 82306 VITAMIN D 25 HYDROXY: CPT

## 2019-09-13 RX ORDER — LORAZEPAM 0.5 MG/1
1 TABLET ORAL
Qty: 15 TAB | Refills: 0 | Status: CANCELLED | OUTPATIENT
Start: 2019-09-13

## 2019-09-13 NOTE — PROGRESS NOTES
Zoie Damico is a 21 y.o. female here today with c/o dizziness. She was seen at the hospital where they told her it was her anxiety. She also needs a refill on her Ativan.

## 2019-09-13 NOTE — PROGRESS NOTES
Claribel Mejia is a 21 y.o. female who presents to the office today for fatigue and dizziness  She comes in today with complaints of fatigue and dizziness. This is been going on for quite some time. She has been seen in the ER for this a few times and she was told this is likely due to her anxiety. She was treated with ativan in the ED and says this helped. She states she has been having several months of feeling lightheaded not quite dizzy, generalized fatigue and weakness and anxiousness. This started a long time ago but worsened since the passing of her mother in March. I treated her for her anxiety and depression with Prozac in the past but she stopped taking this due to lack of follow-up. She is seeing a dermatologist for her hidradenitis and was on Humira but stopped the medication. She states it was actually helping her  She states that she and her  are trying to conceive. She denies suicidal or homicidal thoughts or ideations. She denies tobacco or alcohol use, no drug use       Chief Complaint   Patient presents with    Dizziness   . Current Outpatient Medications on File Prior to Visit   Medication Sig Dispense Refill    LORazepam (ATIVAN) 0.5 mg tablet Take 2 Tabs by mouth two (2) times daily as needed for Anxiety. Max Daily Amount: 2 mg. 15 Tab 0    ondansetron (ZOFRAN ODT) 4 mg disintegrating tablet Take 1-2 tablets every 6-8 hours as needed for nausea and vomiting. 10 Tab 0    loratadine (CLARITIN) 10 mg tablet Take 1 Tab by mouth daily. 100 Tab 0    fluticasone (FLONASE) 50 mcg/actuation nasal spray 2 Sprays by Both Nostrils route daily. 1 Bottle 0     No current facility-administered medications on file prior to visit.       Allergies   Allergen Reactions    Amoxicillin Rash    Codeine Other (comments)     \"mean\"       Past Medical History:   Diagnosis Date    Anxiety and depression     Asthma     Hidradenitis suppurativa     Menorrhagia     Migraine     Tobacco abuse Started around age 15 years     Social History     Tobacco Use   Smoking Status Current Every Day Smoker    Packs/day: 0.25    Types: Cigarettes    Start date: 1/1/2009   Smokeless Tobacco Never Used     Social History     Substance and Sexual Activity   Alcohol Use Yes    Alcohol/week: 2.0 standard drinks    Types: 2 Shots of liquor per week    Comment: rarely     Family History   Problem Relation Age of Onset    Hypertension Mother     Other Mother         anxiety, fibromyalgia    Cancer Paternal Grandmother 54        ovarian       Review of Systems   Constitutional: Positive for malaise/fatigue and weight loss. Negative for diaphoresis and fever. Eyes: Negative for blurred vision and double vision. Respiratory: Negative for shortness of breath. Cardiovascular: Negative for chest pain, palpitations and leg swelling. Gastrointestinal: Negative for diarrhea, nausea and vomiting. Musculoskeletal: Negative for falls. Neurological: Positive for dizziness. Negative for focal weakness, seizures and loss of consciousness. Endo/Heme/Allergies: Does not bruise/bleed easily. Psychiatric/Behavioral: Positive for depression. Negative for substance abuse and suicidal ideas. The patient is nervous/anxious. Visit Vitals  /86 (BP 1 Location: Right arm, BP Patient Position: Sitting)   Pulse (!) 118   Temp 98.6 °F (37 °C) (Oral)   Resp 18   Ht 5' 4\" (1.626 m)   Wt 238 lb 6.4 oz (108.1 kg)   LMP 08/11/2019 (Exact Date)   SpO2 100%   Breastfeeding? Unknown   BMI 40.92 kg/m²     Physical Exam   Constitutional: She is oriented to person, place, and time and well-developed, well-nourished, and in no distress. Neck: Neck supple. No thyromegaly present. Cardiovascular: Regular rhythm and normal heart sounds. Tachycardia present. Pulmonary/Chest: Effort normal and breath sounds normal.   Musculoskeletal: She exhibits no edema. Neurological: She is alert and oriented to person, place, and time. Gait normal.   Psychiatric: Mood, memory and affect normal.   Nursing note and vitals reviewed. Assessment and Plan  1. Anxiety state  Will check thyroid function. If normal, I will restart prozac for anxiety. She and her sister seem to be doing well since the death of their mother  - TSH AND FREE T4; Future  - VITAMIN D, 25 HYDROXY; Future    2. Lightheaded    - TSH AND FREE T4; Future  - VITAMIN D, 25 HYDROXY; Future    3. Chronic fatigue  Checking thyroid and vitamin D level  - TSH AND FREE T4; Future  - VITAMIN D, 25 HYDROXY; Future    4. Encounter for immunization    - INFLUENZA VIRUS VAC QUAD,SPLIT,PRESV FREE SYRINGE IM    5. Missed period  U-preg negative  - AMB POC URINE PREGNANCY TEST, VISUAL COLOR COMPARISON    6. Attempting to conceive    - AMB POC URINE PREGNANCY TEST, VISUAL COLOR COMPARISON      Reviewed medication and side effects. Patient agrees with the plan and verbalizes understanding. I have spent over 25 minutes in face to face time with this pt in discussion with respect to the aforementioned problems, diagnoses and management plans. >50% of the time was spent counseling and coordinating care. Follow-up and Dispositions    · Return in about 6 weeks (around 10/25/2019) for Anxiety. Waldo Orr PA-C  9/22/2019    PLEASE NOTE:  This document has been produced using voice recognition software. Unrecognized errors in transcription may be present.

## 2019-09-14 LAB
25(OH)D3 SERPL-MCNC: 23.6 NG/ML (ref 30–100)
T4 FREE SERPL-MCNC: 1.3 NG/DL (ref 0.7–1.5)
TSH SERPL DL<=0.05 MIU/L-ACNC: 2.64 UIU/ML (ref 0.36–3.74)

## 2019-09-15 DIAGNOSIS — F33.9 RECURRENT MAJOR DEPRESSIVE DISORDER, REMISSION STATUS UNSPECIFIED (HCC): ICD-10-CM

## 2019-09-15 DIAGNOSIS — R45.86 MOOD SWING: ICD-10-CM

## 2019-09-15 DIAGNOSIS — F41.9 ANXIOUS MOOD: ICD-10-CM

## 2019-09-15 DIAGNOSIS — F50.81 BINGE EATING DISORDER: ICD-10-CM

## 2019-09-15 RX ORDER — FLUOXETINE HYDROCHLORIDE 20 MG/1
20 CAPSULE ORAL DAILY
Qty: 30 CAP | Refills: 1 | Status: SHIPPED | OUTPATIENT
Start: 2019-09-15 | End: 2022-01-13

## 2019-09-22 NOTE — PATIENT INSTRUCTIONS
Fluoxetine (By mouth)   Fluoxetine ([id number])  Treats depression, obsessive-compulsive disorder (OCD), bulimia nervosa, and panic disorder. This medicine is an SSRI. Brand Name(s): FLUoxetine HCl, PROzac, PROzac Weekly, Sarafem   There may be other brand names for this medicine. When This Medicine Should Not Be Used: This medicine is not right for everyone. Do not use it if you had an allergic reaction to fluoxetine. How to Use This Medicine:   Capsule, Delayed Release Capsule, Liquid, Tablet  · Take your medicine as directed. Your dose may need to be changed several times to find what works best for you. Take your medicine at the same time each day. · You may need to take this medicine for a month or longer before you feel better. If you feel that the medicine is not working well, tell your doctor right away. Do not take more than your normal dose. · Measure the oral liquid medicine with a marked measuring spoon, oral syringe, or medicine cup. · Delayed-release capsule: Swallow whole. Do not crush, break, or chew it. · This medicine should come with a Medication Guide. Ask your pharmacist for a copy if you do not have one. · Missed dose:   ¨ Every day dose (Prozac® or Sarafem®): If you miss a dose or forget to take your medicine, take it as soon as you can. If it is almost time for your next dose, wait until then to take the medicine and skip the missed dose. Do not use extra medicine to make up for a missed dose. ¨ Once-a-week dose [facility]): If you miss a dose or forget to take your medicine, take it as soon as you can. Then go back to your regular schedule the next week. Do not use extra medicine to make up for a missed dose. · Store the medicine in a closed container at room temperature, away from heat, moisture, and direct light.   Drugs and Foods to Avoid:   Ask your doctor or pharmacist before using any other medicine, including over-the-counter medicines, vitamins, and herbal products. · Do not use this medicine together with pimozide or thioridazine. Do not use this medicine within 14 days of using an MAO inhibitor, and do not start an MAOI for at least 5 weeks after you stop using fluoxetine. · Some medicines can affect how fluoxetine works. Tell your doctor if you are using any of the following:  ¨ Buspirone, carbamazepine, dolasetron, erythromycin, fentanyl, gatifloxacin, lithium, mefloquine, methadone, moxifloxacin, pentamidine, phenytoin, probucol, Romeo's wort, tacrolimus, tramadol, tryptophan supplement, or vinblastine  ¨ Amphetamines  ¨ Blood thinner (including warfarin)  ¨ Diuretic (water pill)  ¨ Medicine for heart rhythm problem  ¨ Medicine to treat mental illness (including chlorpromazine, droperidol, iloperidone, ziprasidone)  ¨ NSAID pain or arthritis medicine (including aspirin, celecoxib, diclofenac, ibuprofen, naproxen)  ¨ Phenothiazine medicine  ¨ Triptan medicine to treat migraine headache  · Do not drink alcohol while you are using this medicine. · Tell your doctor if you use anything else that makes you sleepy. Some examples are allergy medicine, narcotic pain medicine, and alcohol. Warnings While Using This Medicine:   · Tell your doctor if you are pregnant or breastfeeding, or if you have kidney disease, liver disease, bleeding problems, diabetes, glaucoma, or a history of seizures. Tell your doctor if you have had heart disease, a heart rhythm problem (such as QT prolongation), heart attack, heart failure, low blood pressure, or a stroke. · For some children, teenagers, and young adults, this medicine may increase mental or emotional problems. This may lead to thoughts of suicide and violence. Talk with your doctor right away if you have any thoughts or behavior changes that concern you. Tell your doctor if you or anyone in your family has a history of bipolar disorder or suicide attempts.   · This medicine may cause the following problems:  ¨ Serotonin syndrome (may be life-threatening when used with certain other medicines)  ¨ Higher risk of bleeding  ¨ Low sodium levels in the blood  ¨ Heart rhythm changes  · Do not stop using this medicine suddenly. Your doctor will need to slowly decrease your dose before you stop it completely. · This medicine may make you dizzy or drowsy. Do not drive or do anything else that could be dangerous until you know how this medicine affects you. · Keep all medicine out of the reach of children. Never share your medicine with anyone. Possible Side Effects While Using This Medicine:   Call your doctor right away if you notice any of these side effects:  · Allergic reaction: Itching or hives, swelling in your face or hands, swelling or tingling in your mouth or throat, chest tightness, trouble breathing  · Anxiety, restlessness, fever, sweating, muscle spasms, nausea, vomiting, diarrhea, seeing or hearing things that are not there  · Confusion, weakness, and muscle twitching  · Eye pain, trouble seeing, blurry vision  · Fast, pounding, or uneven heartbeat, dizziness  · Seizures  · Skin rash, blisters, peeling, or redness  · Trouble breathing  · Unusual behavior, thoughts of hurting yourself or others, feeling more excited or energetic than usual, trouble sleeping  · Unusual bleeding or bruising  If you notice these less serious side effects, talk with your doctor:   · Diarrhea, changes in appetite, weight gain or loss  · Dry mouth  · Sleepiness or unusual drowsiness, unusual dreams  · Sexual problems  If you notice other side effects that you think are caused by this medicine, tell your doctor. Call your doctor for medical advice about side effects. You may report side effects to FDA at 0-826-FDA-0054  © 2017 [address] Information is for End User's use only and may not be sold, redistributed or otherwise used for commercial purposes. The above information is an  only.  It is not intended as medical advice for individual conditions or treatments. Talk to your doctor, nurse or pharmacist before following any medical regimen to see if it is safe and effective for you.

## 2019-10-08 ENCOUNTER — HOSPITAL ENCOUNTER (EMERGENCY)
Age: 24
Discharge: HOME | End: 2019-10-08
Attending: EMERGENCY MEDICINE
Payer: MEDICAID

## 2019-10-08 VITALS
RESPIRATION RATE: 14 BRPM | BODY MASS INDEX: 40.63 KG/M2 | TEMPERATURE: 98.8 F | OXYGEN SATURATION: 100 % | DIASTOLIC BLOOD PRESSURE: 112 MMHG | SYSTOLIC BLOOD PRESSURE: 171 MMHG | WEIGHT: 238 LBS | HEART RATE: 105 BPM | HEIGHT: 64 IN

## 2019-10-08 DIAGNOSIS — B37.31 YEAST INFECTION OF THE VAGINA: ICD-10-CM

## 2019-10-08 DIAGNOSIS — R10.31 ABDOMINAL PAIN, RIGHT LOWER QUADRANT: Primary | ICD-10-CM

## 2019-10-08 DIAGNOSIS — N76.0 BV (BACTERIAL VAGINOSIS): ICD-10-CM

## 2019-10-08 DIAGNOSIS — B96.89 BV (BACTERIAL VAGINOSIS): ICD-10-CM

## 2019-10-08 LAB
ANION GAP SERPL CALC-SCNC: 6 MMOL/L (ref 3–18)
APPEARANCE UR: ABNORMAL
BACTERIA URNS QL MICRO: ABNORMAL /HPF
BASOPHILS # BLD: 0 K/UL (ref 0–0.1)
BASOPHILS NFR BLD: 0 % (ref 0–2)
BILIRUB UR QL: NEGATIVE
BUN SERPL-MCNC: 7 MG/DL (ref 7–18)
BUN/CREAT SERPL: 7 (ref 12–20)
CALCIUM SERPL-MCNC: 9.4 MG/DL (ref 8.5–10.1)
CHLORIDE SERPL-SCNC: 107 MMOL/L (ref 100–111)
CO2 SERPL-SCNC: 26 MMOL/L (ref 21–32)
COLOR UR: YELLOW
CREAT SERPL-MCNC: 0.95 MG/DL (ref 0.6–1.3)
DIFFERENTIAL METHOD BLD: ABNORMAL
EOSINOPHIL # BLD: 0.3 K/UL (ref 0–0.4)
EOSINOPHIL NFR BLD: 2 % (ref 0–5)
EPITH CASTS URNS QL MICRO: ABNORMAL /LPF (ref 0–5)
ERYTHROCYTE [DISTWIDTH] IN BLOOD BY AUTOMATED COUNT: 16.5 % (ref 11.6–14.5)
GLUCOSE SERPL-MCNC: 89 MG/DL (ref 74–99)
GLUCOSE UR STRIP.AUTO-MCNC: NEGATIVE MG/DL
HCG SERPL-ACNC: <1 MIU/ML (ref 0–10)
HCG UR QL: NEGATIVE
HCT VFR BLD AUTO: 34.7 % (ref 35–45)
HGB BLD-MCNC: 11.6 G/DL (ref 12–16)
HGB UR QL STRIP: NEGATIVE
KETONES UR QL STRIP.AUTO: NEGATIVE MG/DL
LACTATE BLD-SCNC: 0.99 MMOL/L (ref 0.4–2)
LEUKOCYTE ESTERASE UR QL STRIP.AUTO: ABNORMAL
LYMPHOCYTES # BLD: 3.6 K/UL (ref 0.9–3.6)
LYMPHOCYTES NFR BLD: 26 % (ref 21–52)
MCH RBC QN AUTO: 26.2 PG (ref 24–34)
MCHC RBC AUTO-ENTMCNC: 33.4 G/DL (ref 31–37)
MCV RBC AUTO: 78.3 FL (ref 74–97)
MONOCYTES # BLD: 1 K/UL (ref 0.05–1.2)
MONOCYTES NFR BLD: 7 % (ref 3–10)
NEUTS SEG # BLD: 9.3 K/UL (ref 1.8–8)
NEUTS SEG NFR BLD: 65 % (ref 40–73)
NITRITE UR QL STRIP.AUTO: NEGATIVE
PH UR STRIP: 6 [PH] (ref 5–8)
PLATELET # BLD AUTO: 425 K/UL (ref 135–420)
PMV BLD AUTO: 8.9 FL (ref 9.2–11.8)
POTASSIUM SERPL-SCNC: 3.8 MMOL/L (ref 3.5–5.5)
PROT UR STRIP-MCNC: NEGATIVE MG/DL
RBC # BLD AUTO: 4.43 M/UL (ref 4.2–5.3)
RBC #/AREA URNS HPF: ABNORMAL /HPF (ref 0–5)
SERVICE CMNT-IMP: NORMAL
SODIUM SERPL-SCNC: 139 MMOL/L (ref 136–145)
SP GR UR REFRACTOMETRY: 1.01 (ref 1–1.03)
UROBILINOGEN UR QL STRIP.AUTO: 0.2 EU/DL (ref 0.2–1)
WBC # BLD AUTO: 14.2 K/UL (ref 4.6–13.2)
WBC URNS QL MICRO: ABNORMAL /HPF (ref 0–4)
WET PREP GENITAL: NORMAL
YEAST BUDDING URNS QL: POSITIVE
YEAST URNS QL MICRO: ABNORMAL

## 2019-10-08 PROCEDURE — 81001 URINALYSIS AUTO W/SCOPE: CPT

## 2019-10-08 PROCEDURE — 81025 URINE PREGNANCY TEST: CPT

## 2019-10-08 PROCEDURE — 87661 TRICHOMONAS VAGINALIS AMPLIF: CPT

## 2019-10-08 PROCEDURE — 85025 COMPLETE CBC W/AUTO DIFF WBC: CPT

## 2019-10-08 PROCEDURE — 87106 FUNGI IDENTIFICATION YEAST: CPT

## 2019-10-08 PROCEDURE — 87086 URINE CULTURE/COLONY COUNT: CPT

## 2019-10-08 PROCEDURE — 83605 ASSAY OF LACTIC ACID: CPT

## 2019-10-08 PROCEDURE — 74011250637 HC RX REV CODE- 250/637: Performed by: STUDENT IN AN ORGANIZED HEALTH CARE EDUCATION/TRAINING PROGRAM

## 2019-10-08 PROCEDURE — 80048 BASIC METABOLIC PNL TOTAL CA: CPT

## 2019-10-08 PROCEDURE — 99284 EMERGENCY DEPT VISIT MOD MDM: CPT

## 2019-10-08 PROCEDURE — 87210 SMEAR WET MOUNT SALINE/INK: CPT

## 2019-10-08 PROCEDURE — 74011250637 HC RX REV CODE- 250/637: Performed by: NURSE PRACTITIONER

## 2019-10-08 PROCEDURE — 84702 CHORIONIC GONADOTROPIN TEST: CPT

## 2019-10-08 RX ORDER — IBUPROFEN 400 MG/1
800 TABLET ORAL ONCE
Status: COMPLETED | OUTPATIENT
Start: 2019-10-08 | End: 2019-10-08

## 2019-10-08 RX ORDER — FLUCONAZOLE 150 MG/1
150 TABLET ORAL DAILY
Qty: 1 TAB | Refills: 0 | Status: SHIPPED | OUTPATIENT
Start: 2019-10-08 | End: 2019-10-09

## 2019-10-08 RX ORDER — ACETAMINOPHEN 325 MG/1
650 TABLET ORAL ONCE
Status: COMPLETED | OUTPATIENT
Start: 2019-10-08 | End: 2019-10-08

## 2019-10-08 RX ORDER — FLUCONAZOLE 100 MG/1
200 TABLET ORAL DAILY
Status: DISCONTINUED | OUTPATIENT
Start: 2019-10-09 | End: 2019-10-08

## 2019-10-08 RX ORDER — METRONIDAZOLE 500 MG/1
500 TABLET ORAL 2 TIMES DAILY
Qty: 14 TAB | Refills: 0 | Status: SHIPPED | OUTPATIENT
Start: 2019-10-08 | End: 2019-10-15

## 2019-10-08 RX ORDER — FLUCONAZOLE 100 MG/1
200 TABLET ORAL DAILY
Status: DISCONTINUED | OUTPATIENT
Start: 2019-10-08 | End: 2019-10-09 | Stop reason: HOSPADM

## 2019-10-08 RX ADMIN — ACETAMINOPHEN 650 MG: 325 TABLET ORAL at 23:20

## 2019-10-08 RX ADMIN — IBUPROFEN 800 MG: 400 TABLET, FILM COATED ORAL at 23:21

## 2019-10-08 RX ADMIN — FLUCONAZOLE 200 MG: 100 TABLET ORAL at 23:21

## 2019-10-08 NOTE — ED TRIAGE NOTES
The patient presents for evaluation of RLQ abdominal pain x one month. Denies nausea, vomiting, diarrhea, or urinary frequency, pain, or urgency.

## 2019-10-09 NOTE — DISCHARGE INSTRUCTIONS
Patient Education        Bacterial Vaginosis: Care Instructions  Your Care Instructions    Bacterial vaginosis is a type of vaginal infection. It is caused by excess growth of certain bacteria that are normally found in the vagina. Symptoms can include itching, swelling, pain when you urinate or have sex, and a gray or yellow discharge with a \"fishy\" odor. It is not considered an infection that is spread through sexual contact. Although symptoms can be annoying and uncomfortable, bacterial vaginosis does not usually cause other health problems. However, if you have it while you are pregnant, it can cause complications. While the infection may go away on its own, most doctors use antibiotics to treat it. You may have been prescribed pills or vaginal cream. With treatment, bacterial vaginosis usually clears up in 5 to 7 days. Follow-up care is a key part of your treatment and safety. Be sure to make and go to all appointments, and call your doctor if you are having problems. It's also a good idea to know your test results and keep a list of the medicines you take. How can you care for yourself at home? · Take your antibiotics as directed. Do not stop taking them just because you feel better. You need to take the full course of antibiotics. · Do not eat or drink anything that contains alcohol if you are taking metronidazole (Flagyl). · Keep using your medicine if you start your period. Use pads instead of tampons while using a vaginal cream or suppository. Tampons can absorb the medicine. · Wear loose cotton clothing. Do not wear nylon and other materials that hold body heat and moisture close to the skin. · Do not scratch. Relieve itching with a cold pack or a cool bath. · Do not wash your vaginal area more than once a day. Use plain water or a mild, unscented soap. Do not douche. When should you call for help?   Watch closely for changes in your health, and be sure to contact your doctor if:    · You have unexpected vaginal bleeding.     · You have a fever.     · You have new or increased pain in your vagina or pelvis.     · You are not getting better after 1 week.     · Your symptoms return after you finish the course of your medicine. Where can you learn more? Go to http://tavia-dontrell.info/. Jeremy Shipman in the search box to learn more about \"Bacterial Vaginosis: Care Instructions. \"  Current as of: February 19, 2019  Content Version: 12.2  © 3634-7483 [employer]. Care instructions adapted under license by [employer] (which disclaims liability or warranty for this information). If you have questions about a medical condition or this instruction, always ask your healthcare professional. [address] any warranty or liability for your use of this information.

## 2019-10-09 NOTE — ED PROVIDER NOTES
EMERGENCY DEPARTMENT HISTORY AND PHYSICAL EXAM    9:02 PM      Date: 10/8/2019  Patient Name: Matt Ellis    History of Presenting Illness     Chief Complaint   Patient presents with    Abdominal Pain         History Provided By: Patient  Location/Duration/Severity/Modifying factors   HPI     75-year-old female with a history of migraine, severe hidradenitis suppurativa,  anxiety, tobacco use, and menorrhagia presents the ED with right lower quadrant tenderness for 1 month, increased vaginal discharge, and a recently positive pregnancy test 5 days ago with subsequent negative pregnancy test that she is taken at home. Patient denies any nausea, vomiting, fever, chills. She did have some subjective fever prior to a recent presentation to the ED, she was found to have UTI and treated with a dose of \"some antibiotic\" she cannot remember which kind, on chart review appears to be Diflucan 150 mg. PCP: Jada Grider PA-C    Current Outpatient Medications   Medication Sig Dispense Refill    metroNIDAZOLE (FLAGYL) 500 mg tablet Take 1 Tab by mouth two (2) times a day for 7 days. Indications: an infection of the vagina called bacterial vaginosis 14 Tab 0    fluconazole (DIFLUCAN) 150 mg tablet Take 1 Tab by mouth daily for 1 day. FDA advises cautious prescribing of oral fluconazole in pregnancy. Indications: a yeast infection of the vagina and vulva 1 Tab 0    FLUoxetine (PROZAC) 20 mg capsule Take 1 Cap by mouth daily. 30 Cap 1    LORazepam (ATIVAN) 0.5 mg tablet Take 2 Tabs by mouth two (2) times daily as needed for Anxiety. Max Daily Amount: 2 mg. 15 Tab 0    ondansetron (ZOFRAN ODT) 4 mg disintegrating tablet Take 1-2 tablets every 6-8 hours as needed for nausea and vomiting. 10 Tab 0    loratadine (CLARITIN) 10 mg tablet Take 1 Tab by mouth daily. 100 Tab 0    fluticasone (FLONASE) 50 mcg/actuation nasal spray 2 Sprays by Both Nostrils route daily.  1 Bottle 0       Past History     Past Medical History:  Past Medical History:   Diagnosis Date    Anxiety and depression     Asthma     Hidradenitis suppurativa     Menorrhagia     Migraine     Tobacco abuse     Started around age 15 years       Past Surgical History:  Past Surgical History:   Procedure Laterality Date    DRAIN PILONIDAL CYST COMPLIC  6-49-65    Dr. Nabila Lee    HX OTHER SURGICAL      excision pilonidal cyst    REMV PILONIDAL LESION EXTENS N/A 10/21/2016    Dr. Nabila Lee       Family History:  Family History   Problem Relation Age of Onset    Hypertension Mother     Other Mother         anxiety, fibromyalgia    Cancer Paternal Grandmother 54        ovarian       Social History:  Social History     Tobacco Use    Smoking status: Current Every Day Smoker     Packs/day: 0.25     Types: Cigarettes     Start date: 1/1/2009    Smokeless tobacco: Never Used   Substance Use Topics    Alcohol use: Yes     Alcohol/week: 2.0 standard drinks     Types: 2 Shots of liquor per week     Comment: rarely    Drug use: No       Allergies: Allergies   Allergen Reactions    Amoxicillin Rash    Codeine Other (comments)     \"mean\"           Review of Systems       Review of Systems   Constitutional: Negative. HENT: Negative. Respiratory: Negative. Cardiovascular: Negative. Gastrointestinal: Positive for abdominal pain. Genitourinary: Positive for difficulty urinating, dyspareunia, dysuria and flank pain. Talat Lapping urine\"   Skin: Negative. Neurological: Negative. Hematological: Negative. Psychiatric/Behavioral: The patient is nervous/anxious. Physical Exam     Visit Vitals  BP (!) 171/112 (BP 1 Location: Left arm, BP Patient Position: At rest)   Pulse (!) 105   Temp 98.8 °F (37.1 °C)   Resp 14   Ht 5' 4\" (1.626 m)   Wt 108 kg (238 lb)   SpO2 100%   BMI 40.85 kg/m²         Physical Exam   Constitutional: She appears well-developed and well-nourished. No distress. HENT:   Head: Normocephalic and atraumatic. Eyes: Pupils are equal, round, and reactive to light. Right eye exhibits no discharge. Left eye exhibits no discharge. Neck: Normal range of motion. No JVD present. No tracheal deviation present. Cardiovascular: Normal rate and regular rhythm. Exam reveals no gallop and no friction rub. No murmur heard. Pulmonary/Chest: Effort normal. No respiratory distress. She has no wheezes. She has no rales. She exhibits no tenderness. Abdominal: Soft. She exhibits no distension. Genitourinary: There is no tenderness, lesion or injury on the right labia. There is no tenderness, lesion or injury on the left labia. No bleeding in the vagina. No foreign body in the vagina. No signs of injury around the vagina. Vaginal discharge found. Musculoskeletal: She exhibits no edema or deformity. Skin: Rash noted. She is not diaphoretic. There is erythema. No pallor. Severe systemic/diffuse HS (hydradenits suppurativa)   Psychiatric: She has a normal mood and affect. Diagnostic Study Results     Labs -    UA postive for yeast, neg nitrites, trace LE with 4+ squams  BMP wnl  CBC significant for  Elevated WBC without neutrophilia, c/w prior  Lactate negative  hcg negative  Wet prep shows    Wet prep RARE   CLUE CELLS PRESENT      Final   Wet prep      Final   NO TRICHOMONAS SEEN      G and C pending        Radiologic Studies -   No orders to display         Medical Decision Making   I am the first provider for this patient. I reviewed the vital signs, available nursing notes, past medical history, past surgical history, family history and social history. Vital Signs-Reviewed the patient's vital signs. EKG: none    Records Reviewed: Old Medical Records, Previous Radiology Studies and Previous Laboratory Studies (Time of Review: 9:02 PM)    ED Course: Progress Notes, Reevaluation, and Consults:    ED Course as of Oct 09 1544   Tue Oct 08, 2019   8702 I reviewed patient's labs.  Consistent with bacterial vaginosis and yeast infection. Patient will be discharged with Diflucan and metronidazole, first dose is to be received here in the ED. I discussed the findings with the patient extensively, and informed her that her beta-hCG was negative. I provided her with strict return precautions, follow-up instructions, and she felt safe for discharge.    [SH]      ED Course User Index  [SH] Annabelle Joy MD       Provider Notes (Medical Decision Making):   MDM  Number of Diagnoses or Management Options  Abdominal pain, right lower quadrant: minor  BV (bacterial vaginosis): new, needed workup  Yeast infection of the vagina: established, worsening  Diagnosis management comments: 25Y F with a history of HS, anxiety/depression, and recent positive pregnancy test who presents to the ED with abdominal tenderness for 1 month in the RLQ, non radiating without N/V/F/C, changes in bowel or bladder habits and vaginal discharge, worsening since her last visit in late September. Exam and wet prep c/w BV and yeast infection. Pregnancy test negative (hcg ql). Patient discharged with diflucan, and metronidazole, strict return precautions, and follow up intstructions. Ms. Wilber White understood all results discussed, and felt safe for discharge. Amount and/or Complexity of Data Reviewed  Clinical lab tests: ordered and reviewed  Tests in the medicine section of CPT®: ordered and reviewed  Decide to obtain previous medical records or to obtain history from someone other than the patient: yes  Review and summarize past medical records: yes    Risk of Complications, Morbidity, and/or Mortality  Presenting problems: low  Diagnostic procedures: low  Management options: low        Procedures    Critical Care Time: <30      Diagnosis     Clinical Impression:   1. Abdominal pain, right lower quadrant    2. BV (bacterial vaginosis)    3.  Yeast infection of the vagina        Disposition: discharged    Follow-up Information     Follow up With Specialties Details Why Contact Robbin Heller PA-C Physician Assistant   Luis Alberto  [address] [place] [address] [address] [address] [phone]      [facility] EMERGENCY DEPT Emergency Medicine  If symptoms worsen, if you have any worsening symptoms, questions or concerns regarding your symptoms [address] [place] [address]  [phone]           Discharge Medication List as of 10/8/2019 11:07 PM      START taking these medications    Details   metroNIDAZOLE (FLAGYL) 500 mg tablet Take 1 Tab by mouth two (2) times a day for 7 days. Indications: an infection of the vagina called bacterial vaginosis, Print, Disp-14 Tab, R-0      fluconazole (DIFLUCAN) 150 mg tablet Take 1 Tab by mouth daily for 1 day. FDA advises cautious prescribing of oral fluconazole in pregnancy. Indications: a yeast infection of the vagina and vulva, Print, Disp-1 Tab, R-0         CONTINUE these medications which have NOT CHANGED    Details   FLUoxetine (PROZAC) 20 mg capsule Take 1 Cap by mouth daily. , Normal, Disp-30 Cap, R-1      LORazepam (ATIVAN) 0.5 mg tablet Take 2 Tabs by mouth two (2) times daily as needed for Anxiety. Max Daily Amount: 2 mg., Print, Disp-15 Tab, R-0      ondansetron (ZOFRAN ODT) 4 mg disintegrating tablet Take 1-2 tablets every 6-8 hours as needed for nausea and vomiting., Print, Disp-10 Tab, R-0      loratadine (CLARITIN) 10 mg tablet Take 1 Tab by mouth daily. , Print, Disp-100 Tab, R-0      fluticasone (FLONASE) 50 mcg/actuation nasal spray 2 Sprays by Both Nostrils route daily. , Print, Disp-1 Bottle, R-0           Disclaimer: Sections of this note are dictated using utilizing voice recognition software. Minor typographical errors may be present. If questions arise, please do not hesitate to contact me or call our department.

## 2019-10-10 LAB
C TRACH RRNA SPEC QL NAA+PROBE: NEGATIVE
N GONORRHOEA RRNA SPEC QL NAA+PROBE: NEGATIVE
SPECIMEN SOURCE: NORMAL
T VAGINALIS RRNA SPEC QL NAA+PROBE: NEGATIVE

## 2019-10-11 LAB
BACTERIA SPEC CULT: ABNORMAL
SERVICE CMNT-IMP: ABNORMAL

## 2019-11-08 ENCOUNTER — HOSPITAL ENCOUNTER (EMERGENCY)
Age: 24
Discharge: HOME | End: 2019-11-08
Attending: EMERGENCY MEDICINE | Admitting: EMERGENCY MEDICINE
Payer: MEDICAID

## 2019-11-08 VITALS
WEIGHT: 238 LBS | SYSTOLIC BLOOD PRESSURE: 161 MMHG | OXYGEN SATURATION: 100 % | DIASTOLIC BLOOD PRESSURE: 103 MMHG | RESPIRATION RATE: 16 BRPM | HEART RATE: 122 BPM | BODY MASS INDEX: 40.85 KG/M2 | TEMPERATURE: 98.3 F

## 2019-11-08 DIAGNOSIS — L73.2 HIDRADENITIS SUPPURATIVA OF LEFT AXILLA: Primary | ICD-10-CM

## 2019-11-08 DIAGNOSIS — L02.412 CUTANEOUS ABSCESS OF LEFT AXILLA: ICD-10-CM

## 2019-11-08 PROCEDURE — 99281 EMR DPT VST MAYX REQ PHY/QHP: CPT

## 2019-11-08 RX ORDER — CEPHALEXIN 500 MG/1
500 CAPSULE ORAL 4 TIMES DAILY
Qty: 40 CAP | Refills: 0 | Status: SHIPPED | OUTPATIENT
Start: 2019-11-08 | End: 2019-11-18

## 2019-11-08 RX ORDER — TRAMADOL HYDROCHLORIDE 50 MG/1
50 TABLET ORAL
Qty: 18 TAB | Refills: 0 | Status: SHIPPED | OUTPATIENT
Start: 2019-11-08 | End: 2019-11-11

## 2019-11-08 RX ORDER — SULFAMETHOXAZOLE AND TRIMETHOPRIM 800; 160 MG/1; MG/1
1 TABLET ORAL 2 TIMES DAILY
Qty: 20 TAB | Refills: 0 | Status: SHIPPED | OUTPATIENT
Start: 2019-11-08 | End: 2019-11-18

## 2019-11-08 NOTE — DISCHARGE INSTRUCTIONS

## 2019-11-08 NOTE — LETTER
NOTIFICATION RETURN TO WORK / SCHOOL 
 
11/8/2019 6:26 PM 
 
Ms. Paolo Conrad [address] [facility] [id number] To Whom It May Concern: 
 
Paolo Conrad is currently under the care of [facility] EMERGENCY DEPT. She will return to work/school on: 11/11/19. If there are questions or concerns please have the patient contact our office. Sincerely, 
 
 
 
ABDOUL Cano 
11/08/19

## 2019-11-09 NOTE — ED PROVIDER NOTES
EMERGENCY DEPARTMENT HISTORY AND PHYSICAL EXAM    Date: 11/8/2019  Patient Name: Ricky Mccoy    History of Presenting Illness     Chief Complaint   Patient presents with    Skin Problem         History Provided By: Patient        Additional History (Context): Ricky Mccoy is a 21 y.o. 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 Patient states she is currently being followed by a dermatologist for this condition. Patient also has keloid formation problems. Patient states she is been on minocycline x1 month and states her symptoms have not improved on this medication. She denies fever, myalgias or chills. Current symptoms are only on the left axilla, however she has had abscesses in multiple areas of the body to include stomach, chest wall, and groin. PCP: Carlos Howell PA-C    Current Outpatient Medications   Medication Sig Dispense Refill    trimethoprim-sulfamethoxazole (BACTRIM DS) 160-800 mg per tablet Take 1 Tab by mouth two (2) times a day for 10 days. 20 Tab 0    cephALEXin (KEFLEX) 500 mg capsule Take 1 Cap by mouth four (4) times daily for 10 days. 40 Cap 0    traMADol (ULTRAM) 50 mg tablet Take 1 Tab by mouth every four (4) hours as needed for Pain for up to 3 days. Max Daily Amount: 300 mg. 18 Tab 0    FLUoxetine (PROZAC) 20 mg capsule Take 1 Cap by mouth daily. 30 Cap 1    LORazepam (ATIVAN) 0.5 mg tablet Take 2 Tabs by mouth two (2) times daily as needed for Anxiety. Max Daily Amount: 2 mg. 15 Tab 0    ondansetron (ZOFRAN ODT) 4 mg disintegrating tablet Take 1-2 tablets every 6-8 hours as needed for nausea and vomiting. 10 Tab 0    loratadine (CLARITIN) 10 mg tablet Take 1 Tab by mouth daily. 100 Tab 0    fluticasone (FLONASE) 50 mcg/actuation nasal spray 2 Sprays by Both Nostrils route daily.  1 Bottle 0       Past History Past Medical History:  Past Medical History:   Diagnosis Date    Anxiety and depression     Asthma     Hidradenitis suppurativa     Menorrhagia     Migraine     Tobacco abuse     Started around age 15 years       Past Surgical History:  Past Surgical History:   Procedure Laterality Date    DRAIN PILONIDAL CYST COMPLIC  0-52-07    Dr. Erna Barahona    HX OTHER SURGICAL      excision pilonidal cyst    REMV PILONIDAL LESION EXTENS N/A 10/21/2016    Dr. Erna Barahona       Family History:  Family History   Problem Relation Age of Onset    Hypertension Mother     Other Mother         anxiety, fibromyalgia    Cancer Paternal Grandmother 54        ovarian       Social History:  Social History     Tobacco Use    Smoking status: Current Every Day Smoker     Packs/day: 0.25     Types: Cigarettes     Start date: 1/1/2009    Smokeless tobacco: Never Used   Substance Use Topics    Alcohol use: Yes     Alcohol/week: 2.0 standard drinks     Types: 2 Shots of liquor per week     Comment: rarely    Drug use: No       Allergies: Allergies   Allergen Reactions    Amoxicillin Rash    Codeine Other (comments)     \"mean\"           Review of Systems   Review of Systems  Review of Systems   Constitutional: Negative for fatigue and fever. HENT: Negative for congestion. Respiratory: Negative for cough and shortness of breath. Cardiovascular: Negative for chest pain. Gastrointestinal: Negative for abdominal pain, diarrhea, nausea and vomiting. Genitourinary: Negative for difficulty urinating and dysuria. Musculoskeletal: Negative joint pain, joint swelling, recent injury. Skin: Chronic left axilla cellulitis x3 weeks. She denies drainage at this time. Keloid formation secondary to multiple procedures  Neurological: Negative for dizziness and headaches. All other systems reviewed and are negative.        All Other Systems Negative  Physical Exam     Vitals:    11/08/19 1805   BP: (!) 161/103   Pulse: (!) 122 Resp: 16   Temp: 98.3 °F (36.8 °C)   SpO2: 100%   Weight: 108 kg (238 lb)     Physical Exam     Constitutional: Pt is oriented to person, place, and time. Pt appears well-developed and well-nourished. HENT:   Head: Normocephalic and atraumatic. Mouth/Throat: Oropharynx is clear and moist.   Eyes: Pupils are equal, round, and reactive to light. Neck: Normal range of motion. Neck supple. No tracheal deviation present. No thyromegaly present. Cardiovascular: Normal rate, regular rhythm and normal heart sounds. No murmur heard. Pulmonary/Chest: Effort normal and breath sounds normal. No respiratory distress. No wheezes or rales. Musculoskeletal: Normal range of motion. No edema or deformity. Neurological: Pt is alert and oriented to person, place, and time   Skin: Skin of the left axilla is malodorous, chronic changes of the tissue with scar tissue and keloid formation from previous episodes of HS. Keloid of the left anterior chest wall from a previous I&D procedure. Psychiatric: Pt has a normal mood and affect;  behavior is normal. Judgment and thought content normal.           Diagnostic Study Results     Labs -   No results found for this or any previous visit (from the past 12 hour(s)). Radiologic Studies -   No orders to display     CT Results  (Last 48 hours)    None        CXR Results  (Last 48 hours)    None            Medical Decision Making   I am the first provider for this patient. I reviewed the vital signs, available nursing notes, past medical history, past surgical history, family history and social history. Vital Signs-Reviewed the patient's vital signs. Comparison:    Records Reviewed: Nursing Notes    Procedures:  Procedures    Provider Notes (Medical Decision Making):   Minocycline offers poor coverage for MRSA. Patient placed Bactrim and Keflex. Patient wanted to give the antibiotics and opportunity to work prior to incision and drainage of the left axilla abscess. Given patient's history of formation of keloids this is probably the most prudent plan at this time. Patient advised to follow-up with general surgery for possible excision of the scar tissue in hopes of decreasing episodes of hidradenitis abscess formation. MED RECONCILIATION:  No current facility-administered medications for this encounter. Current Outpatient Medications   Medication Sig    trimethoprim-sulfamethoxazole (BACTRIM DS) 160-800 mg per tablet Take 1 Tab by mouth two (2) times a day for 10 days.  cephALEXin (KEFLEX) 500 mg capsule Take 1 Cap by mouth four (4) times daily for 10 days.  traMADol (ULTRAM) 50 mg tablet Take 1 Tab by mouth every four (4) hours as needed for Pain for up to 3 days. Max Daily Amount: 300 mg.    FLUoxetine (PROZAC) 20 mg capsule Take 1 Cap by mouth daily.  LORazepam (ATIVAN) 0.5 mg tablet Take 2 Tabs by mouth two (2) times daily as needed for Anxiety. Max Daily Amount: 2 mg.  ondansetron (ZOFRAN ODT) 4 mg disintegrating tablet Take 1-2 tablets every 6-8 hours as needed for nausea and vomiting.  loratadine (CLARITIN) 10 mg tablet Take 1 Tab by mouth daily.  fluticasone (FLONASE) 50 mcg/actuation nasal spray 2 Sprays by Both Nostrils route daily. Disposition:  Home    DISCHARGE NOTE:     Patient seen, examined and discharged from triage. Patient has no other complaints, changes, or physical findings. Care plan outlined and precautions discussed. Results of exam were reviewed with the patient. All medications were reviewed with the patient; will d/c home with follow up instructions. All of pt's questions and concerns were addressed. Patient was instructed and agrees to follow up with primary care, as well as to return to the ED upon further deterioration. Patient is ready to go home.       Follow-up Information     Follow up With Specialties Details Why Contact Info    Rey Farfan PA-C Physician Assistant   [address]      [facility] EMERGENCY DEPT Emergency Medicine  If symptoms worsen [address] [address] [phone]     GENERAL SURGERY Surgery Schedule an appointment as soon as possible for a visit  [id number] [address]  [phone]          Discharge Medication List as of 11/8/2019  6:27 PM      START taking these medications    Details   trimethoprim-sulfamethoxazole (BACTRIM DS) 160-800 mg per tablet Take 1 Tab by mouth two (2) times a day for 10 days. , Normal, Disp-20 Tab, R-0      cephALEXin (KEFLEX) 500 mg capsule Take 1 Cap by mouth four (4) times daily for 10 days. , Normal, Disp-40 Cap, R-0      traMADol (ULTRAM) 50 mg tablet Take 1 Tab by mouth every four (4) hours as needed for Pain for up to 3 days. Max Daily Amount: 300 mg., Print, Disp-18 Tab, R-0         CONTINUE these medications which have NOT CHANGED    Details   FLUoxetine (PROZAC) 20 mg capsule Take 1 Cap by mouth daily. , Normal, Disp-30 Cap, R-1      LORazepam (ATIVAN) 0.5 mg tablet Take 2 Tabs by mouth two (2) times daily as needed for Anxiety. Max Daily Amount: 2 mg., Print, Disp-15 Tab, R-0      ondansetron (ZOFRAN ODT) 4 mg disintegrating tablet Take 1-2 tablets every 6-8 hours as needed for nausea and vomiting., Print, Disp-10 Tab, R-0      loratadine (CLARITIN) 10 mg tablet Take 1 Tab by mouth daily. , Print, Disp-100 Tab, R-0      fluticasone (FLONASE) 50 mcg/actuation nasal spray 2 Sprays by Both Nostrils route daily. , Print, Disp-1 Bottle, R-0                 Diagnosis     Clinical Impression:   1. Hidradenitis suppurativa of left axilla    2.  Cutaneous abscess of left axilla

## 2019-12-06 ENCOUNTER — OFFICE VISIT (OUTPATIENT)
Dept: SURGERY | Age: 24
End: 2019-12-06

## 2019-12-06 VITALS
RESPIRATION RATE: 16 BRPM | OXYGEN SATURATION: 98 % | WEIGHT: 238 LBS | SYSTOLIC BLOOD PRESSURE: 173 MMHG | BODY MASS INDEX: 40.63 KG/M2 | DIASTOLIC BLOOD PRESSURE: 115 MMHG | HEIGHT: 64 IN | TEMPERATURE: 97.7 F | HEART RATE: 100 BPM

## 2019-12-06 DIAGNOSIS — L73.2 HIDRADENITIS: Primary | ICD-10-CM

## 2019-12-06 NOTE — PROGRESS NOTES
Progress Note    Patient: Maire Galeazzi  MRN: [id number]  SSN: [id number]   YOB: 1995  Age: 21 y.o. Sex: female     Chief Complaint   Patient presents with    Skin Problem       HPI    Ms. Paola Espitia is a 77-year-old woman with a lifelong history of hidradenitis. She has it in every basin of her body including her cleavage of her breast axilla groin legs. She does not have any abscesses but well mature multiple fistula. She is currently on Humira written by her dermatologist.  Initially that helped but she is essentially unchanged at this point. She is to see them at the first of the year and they already have a plan with antibiotics. She currently needs no surgical intervention. Past Medical History:   Diagnosis Date    Anxiety and depression     Asthma     Hidradenitis suppurativa     Menorrhagia     Migraine     Tobacco abuse     Started around age 15 years     Past Surgical History:   Procedure Laterality Date    DRAIN PILONIDAL CYST COMPLIC  8-96-64    Dr. Erna Barahona    HX OTHER SURGICAL      excision pilonidal cyst    REMV PILONIDAL LESION EXTENS N/A 10/21/2016    Dr. Erna Barahona     Allergies   Allergen Reactions    Amoxicillin Rash    Codeine Other (comments)     \"mean\"       Current Outpatient Medications   Medication Sig Dispense Refill    adalimumab (HUMIRA) 40 mg/0.8 mL injection by SubCUTAneous route once.  FLUoxetine (PROZAC) 20 mg capsule Take 1 Cap by mouth daily. 30 Cap 1    LORazepam (ATIVAN) 0.5 mg tablet Take 2 Tabs by mouth two (2) times daily as needed for Anxiety. Max Daily Amount: 2 mg. 15 Tab 0    ondansetron (ZOFRAN ODT) 4 mg disintegrating tablet Take 1-2 tablets every 6-8 hours as needed for nausea and vomiting. 10 Tab 0    loratadine (CLARITIN) 10 mg tablet Take 1 Tab by mouth daily. 100 Tab 0    fluticasone (FLONASE) 50 mcg/actuation nasal spray 2 Sprays by Both Nostrils route daily.  1 Bottle 0     Social History     Socioeconomic History    Marital status: SINGLE     Spouse name: Not on file    Number of children: Not on file    Years of education: Not on file    Highest education level: Not on file   Occupational History    Not on file   Social Needs    Financial resource strain: Not on file    Food insecurity:     Worry: Not on file     Inability: Not on file    Transportation needs:     Medical: Not on file     Non-medical: Not on file   Tobacco Use    Smoking status: Current Every Day Smoker     Packs/day: 0.25     Types: Cigarettes     Start date: 1/1/2009    Smokeless tobacco: Never Used   Substance and Sexual Activity    Alcohol use:  Yes     Alcohol/week: 2.0 standard drinks     Types: 2 Shots of liquor per week     Comment: rarely    Drug use: No    Sexual activity: Yes     Partners: Male     Birth control/protection: None   Lifestyle    Physical activity:     Days per week: Not on file     Minutes per session: Not on file    Stress: Not on file   Relationships    Social connections:     Talks on phone: Not on file     Gets together: Not on file     Attends [employer] service: Not on file     Active member of club or organization: Not on file     Attends meetings of clubs or organizations: Not on file     Relationship status: Not on file    Intimate partner violence:     Fear of current or ex partner: Not on file     Emotionally abused: Not on file     Physically abused: Not on file     Forced sexual activity: Not on file   Other Topics Concern    Not on file   Social History Narrative    Not on file     Family History   Problem Relation Age of Onset    Hypertension Mother     Other Mother         anxiety, fibromyalgia    Cancer Paternal Grandmother 54        ovarian         Review of systems:  Patient denies any reflux, emesis, abdominal pain, change in bowel habits, hematochezia, melena, fever, weight loss, fatigue chills, dermatitis, abnormal moles, change in vision, vertigo, epistaxis, dysphagia, hoarseness, chest pain, palpitations, hypertension, edema, cough, shortness of breath, wheezing, hemoptysis, snoring, hematuria, diabetes, thyroid disease, anemia, bruising, history of blood transfusion, dizziness, headache, or fainting. Physical Examination    Well developed well nourished female in no apparent distress  Visit Vitals  BP (!) 173/115   Pulse 100   Temp 97.7 °F (36.5 °C)   Resp 16   Ht 5' 4\" (1.626 m)   Wt 108 kg (238 lb)   SpO2 98%   BMI 40.85 kg/m²      Head: normocephalic, atraumatic  Mouth: Clear, no overt lesions, oral mucosa pink and moist  Neck: supple, no masses, no adenopathy or carotid bruits, trachea midline  Resp: clear to auscultation bilaterally, no wheeze, rhonchi or rales, excursions normal and symmetrical  Cardio: Regular rate and rhythm, no murmurs, clicks, gallops or rubs, no edema or varicosities  Abdomen: soft, nontender, nondistended, normoactive bowel sounds, no hernias, no hepatosplenomegaly,   Back: Deferred  Extremeties: warm, well-perfused, no tenderness or swelling, normal gait/station  Neuro: sensation and strength grossly intact and symmetrical  Psych: alert and oriented to person, place and time  Breast exam bilateral axillary and breast cleavage hidradenitis    IMPRESSION  Severe lifelong hidradenitis without abscess    PLAN  Orders Placed This Encounter    adalimumab (HUMIRA) 40 mg/0.8 mL injection     Sig: by SubCUTAneous route once.      Follow-up PRN returning to [facility] for further therapeutic plan  Reina Justin MD

## 2020-06-23 ENCOUNTER — HOSPITAL ENCOUNTER (EMERGENCY)
Age: 25
Discharge: HOME | End: 2020-06-23
Attending: EMERGENCY MEDICINE
Payer: COMMERCIAL

## 2020-06-23 VITALS
OXYGEN SATURATION: 99 % | SYSTOLIC BLOOD PRESSURE: 148 MMHG | BODY MASS INDEX: 40.97 KG/M2 | RESPIRATION RATE: 16 BRPM | WEIGHT: 240 LBS | HEIGHT: 64 IN | DIASTOLIC BLOOD PRESSURE: 90 MMHG | HEART RATE: 103 BPM | TEMPERATURE: 98.8 F

## 2020-06-23 DIAGNOSIS — L02.32: Primary | ICD-10-CM

## 2020-06-23 PROCEDURE — 99282 EMERGENCY DEPT VISIT SF MDM: CPT

## 2020-06-23 RX ORDER — AMOXICILLIN 250 MG
1 CAPSULE ORAL DAILY
Qty: 30 TAB | Refills: 0 | Status: SHIPPED | OUTPATIENT
Start: 2020-06-23 | End: 2022-01-13

## 2020-06-23 RX ORDER — LIDOCAINE 3% TOPICAL ANESTHETIC CREAM 0.03 G/G
1 CREAM TOPICAL 2 TIMES DAILY
Qty: 1 TUBE | Refills: 1 | Status: SHIPPED | OUTPATIENT
Start: 2020-06-23 | End: 2020-06-30

## 2020-06-23 RX ORDER — DOXYCYCLINE 100 MG/1
100 CAPSULE ORAL 2 TIMES DAILY
Qty: 20 CAP | Refills: 0 | Status: SHIPPED | OUTPATIENT
Start: 2020-06-23 | End: 2020-07-03

## 2020-06-23 NOTE — ED TRIAGE NOTES
Patient states that 4 days ago she started having real bad anal pain. It feels like it's inside, it hurts her to sit, walk, stand anything causes it pain.

## 2020-06-24 ENCOUNTER — PATIENT OUTREACH (OUTPATIENT)
Dept: CASE MANAGEMENT | Age: 25
End: 2020-06-24

## 2020-06-24 NOTE — DISCHARGE INSTRUCTIONS
Patient Education        Hemorrhoids: Care Instructions  Your Care Instructions     Hemorrhoids are enlarged veins that develop in the anal canal. Bleeding during bowel movements, itching, swelling, and rectal pain are the most common symptoms. They can be uncomfortable at times, but hemorrhoids rarely are a serious problem. You can treat most hemorrhoids with simple changes to your diet and bowel habits. These changes include eating more fiber and not straining to pass stools. Most hemorrhoids do not need surgery or other treatment unless they are very large and painful or bleed a lot. Follow-up care is a key part of your treatment and safety. Be sure to make and go to all appointments, and call your doctor if you are having problems. It's also a good idea to know your test results and keep a list of the medicines you take. How can you care for yourself at home? · Sit in a few inches of warm water (sitz bath) 3 times a day and after bowel movements. The warm water helps with pain and itching. · Put ice on your anal area several times a day for 10 minutes at a time. Put a thin cloth between the ice and your skin. Follow this by placing a warm, wet towel on the area for another 10 to 20 minutes. · Take pain medicines exactly as directed. ? If the doctor gave you a prescription medicine for pain, take it as prescribed. ? If you are not taking a prescription pain medicine, ask your doctor if you can take an over-the-counter medicine. · Keep the anal area clean, but be gentle. Use water and a fragrance-free soap, such as [place], or use baby wipes or medicated pads, such as Tucks. · Wear cotton underwear and loose clothing to decrease moisture in the anal area. · Eat more fiber. Include foods such as whole-grain breads and cereals, raw vegetables, raw and dried fruits, and beans. · Drink plenty of fluids, enough so that your urine is light yellow or clear like water.  If you have kidney, heart, or liver disease and have to limit fluids, talk with your doctor before you increase the amount of fluids you drink. · Use a stool softener that contains bran or psyllium. You can save money by buying bran or psyllium (available in bulk at most health food stores) and sprinkling it on foods or stirring it into fruit juice. Or you can use a product such as Metamucil or Hydrocil. · Practice healthy bowel habits. ? Go to the bathroom as soon as you have the urge. ? Avoid straining to pass stools. Relax and give yourself time to let things happen naturally. ? Do not hold your breath while passing stools. ? Do not read while sitting on the toilet. Get off the toilet as soon as you have finished. · Take your medicines exactly as prescribed. Call your doctor if you think you are having a problem with your medicine. When should you call for help? [id number] anytime you think you may need emergency care. For example, call if:  · You pass maroon or very bloody stools. Call your doctor now or seek immediate medical care if:  · You have increased pain. · You have increased bleeding. Watch closely for changes in your health, and be sure to contact your doctor if:  · Your symptoms have not improved after 3 or 4 days. Where can you learn more? Go to http://tavia-dontrell.info/  Enter F228 in the search box to learn more about \"Hemorrhoids: Care Instructions. \"  Current as of: August 12, 2019               Content Version: 12.5  © 2862-3487 [employer]. Care instructions adapted under license by [employer] (which disclaims liability or warranty for this information). If you have questions about a medical condition or this instruction, always ask your healthcare professional. [place] [address] any warranty or liability for your use of this information.

## 2020-06-24 NOTE — ED PROVIDER NOTES
EMERGENCY DEPARTMENT HISTORY AND PHYSICAL EXAM    2:40 AM  Date: 6/23/2020  Patient Name: Pat Singh    History of Presenting Illness     Chief Complaint   Patient presents with    Anal Pain        History Provided By: Patient    HPI: Pat Singh is a 25 y.o. female with history multiple medical problems as below. Patient is presenting with anal pain for 2 days. Denies any anal discharge or bleeding. Pain is unchanged with bowel movements. No fever or chills or abdominal pain. No vomiting are blood in stool. Location:  Severity:  Timing/course: Onset/Duration:     PCP: Charlene Nance PA-C    Past History     Past Medical History:  Past Medical History:   Diagnosis Date    Anxiety and depression     Asthma     Hidradenitis suppurativa     Menorrhagia     Migraine     Tobacco abuse     Started around age 15 years       Past Surgical History:  Past Surgical History:   Procedure Laterality Date    DRAIN PILONIDAL CYST COMPLIC  [id number]    Dr. Ese Bustos [address] [facility]      excision pilonidal cyst    [place] REMV PILONIDAL LESION EXTENS N/A 10/21/2016    Dr. Becka Morfin       Family History:  Family History   Problem Relation Age of Onset    Hypertension Mother     Other Mother         anxiety, fibromyalgia    Cancer Paternal Grandmother 54        ovarian       Social History:  Social History     Tobacco Use    Smoking status: Current Every Day Smoker     Packs/day: 0.25     Types: Cigarettes     Start date: 1/1/2009    Smokeless tobacco: Never Used   Substance Use Topics    Alcohol use: Yes     Alcohol/week: 2.0 standard drinks     Types: 2 Shots of liquor per week     Comment: rarely    Drug use: No       Allergies: Allergies   Allergen Reactions    Amoxicillin Rash    Codeine Other (comments)     \"mean\"         Review of Systems   Review of Systems   Gastrointestinal: Positive for rectal pain. All other systems reviewed and are negative.        Physical Exam     Patient Vitals for the past 12 hrs:   Temp Pulse Resp BP SpO2   06/23/20 1859 98.8 °F (37.1 °C) (!) 103 16 148/90 99 %       Physical Exam  Vitals signs and nursing note reviewed. Exam conducted with a chaperone present. Constitutional:       Appearance: Normal appearance. HENT:      Head: Normocephalic and atraumatic. Eyes:      Extraocular Movements: Extraocular movements intact. Neck:      Musculoskeletal: Normal range of motion and neck supple. Cardiovascular:      Rate and Rhythm: Normal rate. Pulmonary:      Effort: Pulmonary effort is normal. No respiratory distress. Abdominal:      Palpations: Abdomen is soft. Tenderness: There is no abdominal tenderness. Genitourinary:     Rectum: Anal fissure and internal hemorrhoid present. Musculoskeletal: Normal range of motion. Neurological:      General: No focal deficit present. Mental Status: She is alert and oriented to person, place, and time. Psychiatric:         Mood and Affect: Mood normal.         Behavior: Behavior normal.         Diagnostic Study Results     Labs -  No results found for this or any previous visit (from the past 12 hour(s)). Radiologic Studies -   No results found. Medical Decision Making     ED Course: Progress Notes, Reevaluation, and Consults:    2:40 AM Initial assessment performed. The patients presenting problems have been discussed, and they/their family are in agreement with the care plan formulated and outlined with them. I have encouraged them to ask questions as they arise throughout their visit. Provider Notes (Medical Decision Making): 58-year-old female presenting with anal pain for 2 days. Patient is well-appearing on exam.  Rectal exam shows anal fissures with skin tags and tender papule in the rectum about 7 o'clock not fluctuance or drainage. Could possibly be furuncle to an early abscess. Will start her on antibiotic and topical pain control as well as stool softeners.   We will give her colorectal follow-up as well. Procedures:     Critical Care Time:     Vital Signs-Reviewed the patient's vital signs. Reviewed pt's pulse ox reading. EKG: Interpreted by the EP. Time Interpreted:    Rate:    Rhythm:    Interpretation:   Comparison:     Records Reviewed: Nursing Notes (Time of Review: 2:40 AM)  -I am the first provider for this patient.  -I reviewed the vital signs, available nursing notes, past medical history, past surgical history, family history and social history. Current Outpatient Medications   Medication Sig Dispense Refill    doxycycline (MONODOX) 100 mg capsule Take 1 Cap by mouth two (2) times a day for 10 days. 20 Cap 0    senna-docusate (PERICOLACE) 8.6-50 mg per tablet Take 1 Tab by mouth daily. 30 Tab 0    RX AMB NIFEDIPINE 0.2 % RECTAL OINTMENT COMPOUND Nifedipine 0.2% Rectal Ointment (zinc 25mg/L-Carnitine 10mg)    Apply to anus three times daily. 1 Tube 0    lidocaine 3 % crea 1 Tube by Apply Externally route two (2) times a day for 7 days. 1 Tube 1    adalimumab (HUMIRA) 40 mg/0.8 mL injection by SubCUTAneous route once.  FLUoxetine (PROZAC) 20 mg capsule Take 1 Cap by mouth daily. 30 Cap 1    LORazepam (ATIVAN) 0.5 mg tablet Take 2 Tabs by mouth two (2) times daily as needed for Anxiety. Max Daily Amount: 2 mg. 15 Tab 0    ondansetron (ZOFRAN ODT) 4 mg disintegrating tablet Take 1-2 tablets every 6-8 hours as needed for nausea and vomiting. 10 Tab 0    loratadine (CLARITIN) 10 mg tablet Take 1 Tab by mouth daily. 100 Tab 0    fluticasone (FLONASE) 50 mcg/actuation nasal spray 2 Sprays by Both Nostrils route daily. 1 Bottle 0        Clinical Impression     Clinical Impression:   1. Anal furunculosis        Disposition: DC          This note was dictated utilizing voice recognition software which may lead to typographical errors. I apologize in advance if the situation occurs.   If questions arise please do not hesitate to contact me or call our department.     India Mcginnis MD  2:40 AM

## 2020-06-24 NOTE — PROGRESS NOTES
Date/Time:  6/24/2020 1:07 PM  Attempted to reach patient by telephone. Left HIPPA compliant message requesting a return call. Will attempt to reach patient again.

## 2020-06-25 ENCOUNTER — PATIENT OUTREACH (OUTPATIENT)
Dept: CASE MANAGEMENT | Age: 25
End: 2020-06-25

## 2020-06-25 NOTE — PROGRESS NOTES
Date/Time:  6/25/2020 3:02 PM  Attempted to reach patient by telephone. Left HIPPA compliant message requesting a return call. This is second attempt. Episode resolved.

## 2020-09-08 ENCOUNTER — HOSPITAL ENCOUNTER (EMERGENCY)
Age: 25
Discharge: HOME | End: 2020-09-08
Attending: EMERGENCY MEDICINE

## 2020-09-08 VITALS
BODY MASS INDEX: 40.97 KG/M2 | OXYGEN SATURATION: 100 % | RESPIRATION RATE: 22 BRPM | TEMPERATURE: 98.9 F | HEIGHT: 64 IN | WEIGHT: 240 LBS | HEART RATE: 113 BPM

## 2020-09-08 DIAGNOSIS — L02.811 ABSCESS OF HEAD: Primary | ICD-10-CM

## 2020-09-08 RX ORDER — SULFAMETHOXAZOLE AND TRIMETHOPRIM 800; 160 MG/1; MG/1
1 TABLET ORAL 2 TIMES DAILY
Qty: 14 TAB | Refills: 0 | Status: SHIPPED | OUTPATIENT
Start: 2020-09-08 | End: 2020-09-15

## 2020-09-08 RX ORDER — NAPROXEN 500 MG/1
500 TABLET ORAL 2 TIMES DAILY WITH MEALS
Qty: 20 TAB | Refills: 0 | Status: SHIPPED | OUTPATIENT
Start: 2020-09-08 | End: 2022-01-13

## 2020-09-08 RX ORDER — CEPHALEXIN 500 MG/1
500 CAPSULE ORAL 4 TIMES DAILY
Qty: 28 CAP | Refills: 0 | Status: SHIPPED | OUTPATIENT
Start: 2020-09-08 | End: 2020-09-15

## 2020-09-08 NOTE — ED PROVIDER NOTES
EMERGENCY DEPARTMENT HISTORY AND PHYSICAL EXAM      Date: 9/8/2020  Patient Name: Robert Cerda    History of Presenting Illness     Chief Complaint   Patient presents with    Cyst     head x3 years, causing headache, pressure, painful 8-9/10 seen by Dermatology and PCP, + drainage, crusted over @ night       History Provided By: Patient    HPI: Robert Cerda, 25 y.o. female PMHx significant for asthma, migraines, anxiety, depression, hidradenitis suppurativa presents ambulatory to the ED with cc of \"mass\" to back of head x 3 years. Pt reports increased pain and drainage over the past few days. Denies fever/chills. Hx hidradenitis suppurativa. Pt has been taking ibuprofen without relief of sx. Pt currently on menstrual cycle. Pt has f/u appt with derm on 9/17. Pt reports intermittent headaches due to pressure from \"mass\". Denies blurred vision, numbness/tingling, radiating pain, dizziness. There are no other complaints, changes, or physical findings at this time. PCP: Yanely Weeks PA-C    No current facility-administered medications on file prior to encounter. Current Outpatient Medications on File Prior to Encounter   Medication Sig Dispense Refill    senna-docusate (PERICOLACE) 8.6-50 mg per tablet Take 1 Tab by mouth daily. 30 Tab 0    RX AMB NIFEDIPINE 0.2 % RECTAL OINTMENT COMPOUND Nifedipine 0.2% Rectal Ointment (zinc 25mg/L-Carnitine 10mg)    Apply to anus three times daily. 1 Tube 0    adalimumab (HUMIRA) 40 mg/0.8 mL injection by SubCUTAneous route once.  FLUoxetine (PROZAC) 20 mg capsule Take 1 Cap by mouth daily. 30 Cap 1    LORazepam (ATIVAN) 0.5 mg tablet Take 2 Tabs by mouth two (2) times daily as needed for Anxiety. Max Daily Amount: 2 mg. 15 Tab 0    ondansetron (ZOFRAN ODT) 4 mg disintegrating tablet Take 1-2 tablets every 6-8 hours as needed for nausea and vomiting. 10 Tab 0    loratadine (CLARITIN) 10 mg tablet Take 1 Tab by mouth daily.  100 Tab 0    fluticasone (FLONASE) 50 mcg/actuation nasal spray 2 Sprays by Both Nostrils route daily. 1 Bottle 0       Past History     Past Medical History:  Past Medical History:   Diagnosis Date    Anxiety and depression     Asthma     Hidradenitis suppurativa     Menorrhagia     Migraine     Tobacco abuse     Started around age 15 years       Past Surgical History:  Past Surgical History:   Procedure Laterality Date    DRAIN PILONIDAL CYST COMPLIC  4-23-53    Dr. Godinez Citizen HX OTHER SURGICAL      excision pilonidal cyst    [place] REMV PILONIDAL LESION EXTENS N/A 10/21/2016    Dr. Janette Enrique       Family History:  Family History   Problem Relation Age of Onset    Hypertension Mother     Other Mother         anxiety, fibromyalgia    Cancer Paternal Grandmother 54        ovarian       Social History:  Social History     Tobacco Use    Smoking status: Current Every Day Smoker     Packs/day: 0.25     Types: Cigarettes     Start date: 1/1/2009    Smokeless tobacco: Never Used   Substance Use Topics    Alcohol use: Yes     Alcohol/week: 2.0 standard drinks     Types: 2 Shots of liquor per week     Comment: rarely    Drug use: No       Allergies: Allergies   Allergen Reactions    Amoxicillin Rash    Codeine Other (comments)     \"mean\"           Review of Systems   Review of Systems   Constitutional: Negative for chills and fever. Respiratory: Negative for shortness of breath. Cardiovascular: Negative for chest pain. Gastrointestinal: Negative for abdominal pain, nausea and vomiting. Genitourinary: Negative for flank pain. Musculoskeletal: Negative for back pain and myalgias. Skin: Negative for color change, pallor, rash and wound. \"mass to back of head\"   Neurological: Negative for dizziness, weakness and light-headedness. All other systems reviewed and are negative. Physical Exam   Physical Exam  Vitals signs and nursing note reviewed. Constitutional:       General: She is not in acute distress. Appearance: She is well-developed. Comments: Pt well-appearing in NAD   HENT:      Head: Normocephalic and atraumatic. Eyes:      Conjunctiva/sclera: Conjunctivae normal.   Cardiovascular:      Rate and Rhythm: Normal rate and regular rhythm. Heart sounds: Normal heart sounds. Pulmonary:      Effort: Pulmonary effort is normal. No respiratory distress. Breath sounds: Normal breath sounds. Abdominal:      General: Bowel sounds are normal. There is no distension. Palpations: Abdomen is soft. Musculoskeletal: Normal range of motion. Skin:     General: Skin is warm. Findings: No rash. Neurological:      Mental Status: She is alert and oriented to person, place, and time. Psychiatric:         Behavior: Behavior normal.         Diagnostic Study Results     Labs -   No results found for this or any previous visit (from the past 12 hour(s)). Radiologic Studies -   No orders to display     CT Results  (Last 48 hours)    None        CXR Results  (Last 48 hours)    None          Medical Decision Making   I am the first provider for this patient. I reviewed the vital signs, available nursing notes, past medical history, past surgical history, family history and social history. Vital Signs-Reviewed the patient's vital signs. Patient Vitals for the past 12 hrs:   Temp Pulse Resp SpO2   09/08/20 1634 98.9 °F (37.2 °C) (!) 113 22 100 %       Records Reviewed: Nursing Notes and Old Medical Records    Provider Notes (Medical Decision Making):   DDx: Abscess, Cyst, Hidradenitis Suppurativa    26 yo F who presents with \"mass\" to back of head x 3 years, worsening pain x a few days with drainage. On exam indurated area with a small amount of purulent drainage. No fluctuance or drainage. Due to history of hidradenitis will treat with Keflex and Bactrim. Patient has dental appointment on 9/17. Discussed importance of keeping this appt. ED Course:   Initial assessment performed.  The patients presenting problems have been discussed, and they are in agreement with the care plan formulated and outlined with them. I have encouraged them to ask questions as they arise throughout their visit. Discussed drainage versus no drainage with patient. She opted for antibiotics and follow-up with dermatology. Disposition:  4:46 PM  Discussed dx and treatment plan. Discussed importance of PCP follow up. All questions answered. Pt voiced they understood. Return if sx worsen. PLAN:  1. Current Discharge Medication List      START taking these medications    Details   cephALEXin (Keflex) 500 mg capsule Take 1 Cap by mouth four (4) times daily for 7 days. Qty: 28 Cap, Refills: 0      trimethoprim-sulfamethoxazole (Bactrim DS) 160-800 mg per tablet Take 1 Tab by mouth two (2) times a day for 7 days. Qty: 14 Tab, Refills: 0      naproxen (NAPROSYN) 500 mg tablet Take 1 Tab by mouth two (2) times daily (with meals). Qty: 20 Tab, Refills: 0           2. Follow-up Information     Follow up With Specialties Details Why Contact Info    Bradley Dolan PA-C Physician Assistant Schedule an appointment as soon as possible for a visit in 1 day  [address] [facility] [address]      Suzie Alexis MD Dermatology Schedule an appointment as soon as possible for a visit in 1 day  [address] [facility] [address]  [phone]      [facility] EMERGENCY DEPT Emergency Medicine  If symptoms worsen [address]  [phone]        Return to ED if worse     Diagnosis     Clinical Impression:   1. Abscess of head        Attestations:    ABDOUL Gilbert    Please note that this dictation was completed with [employer], the [employer] voice recognition software. Quite often unanticipated grammatical, syntax, homophones, and other interpretive errors are inadvertently transcribed by the computer software.   Please disregard these errors.  Please excuse any errors that have escaped final proofreading.  Thank you.

## 2020-09-08 NOTE — DISCHARGE INSTRUCTIONS

## 2020-09-08 NOTE — ED TRIAGE NOTES
Head mass, x3 years seen by PCP and Dermatology. Plan: Discharge to  Home with Derm referral,. Abx , warm compress.

## 2020-09-09 ENCOUNTER — PATIENT OUTREACH (OUTPATIENT)
Dept: CASE MANAGEMENT | Age: 25
End: 2020-09-09

## 2020-09-09 NOTE — PROGRESS NOTES
Date/Time:  9/9/2020 9:33 AM  Attempted to reach patient by telephone. Unable to leave HIPPA compliant message requesting a return call. Will attempt to reach patient again.

## 2020-09-10 ENCOUNTER — PATIENT OUTREACH (OUTPATIENT)
Dept: CASE MANAGEMENT | Age: 25
End: 2020-09-10

## 2020-09-10 NOTE — PROGRESS NOTES
Date/Time:  9/10/2020 1:23 PM  Attempted to reach patient by telephone. Unable to leave HIPPA compliant message requesting a return call. Patient resolved from Transition of Care episode on 9-10-20      No further outreach scheduled with this CTN/ACM. Episode of Care resolved.

## 2022-01-13 ENCOUNTER — HOSPITAL ENCOUNTER (EMERGENCY)
Age: 27
Discharge: HOME | End: 2022-01-13
Attending: EMERGENCY MEDICINE
Payer: COMMERCIAL

## 2022-01-13 VITALS
HEART RATE: 81 BPM | OXYGEN SATURATION: 100 % | RESPIRATION RATE: 20 BRPM | TEMPERATURE: 98.8 F | DIASTOLIC BLOOD PRESSURE: 104 MMHG | WEIGHT: 284 LBS | BODY MASS INDEX: 48.49 KG/M2 | HEIGHT: 64 IN | SYSTOLIC BLOOD PRESSURE: 152 MMHG

## 2022-01-13 DIAGNOSIS — N94.6 DYSMENORRHEA: Primary | ICD-10-CM

## 2022-01-13 DIAGNOSIS — N92.1 MENORRHAGIA WITH IRREGULAR CYCLE: ICD-10-CM

## 2022-01-13 LAB
ABO + RH BLD: NORMAL
APPEARANCE UR: CLEAR
BACTERIA URNS QL MICRO: NEGATIVE /HPF
BASOPHILS # BLD: 0 K/UL (ref 0–0.1)
BASOPHILS NFR BLD: 0 % (ref 0–2)
BILIRUB UR QL: NEGATIVE
BLOOD GROUP ANTIBODIES SERPL: NORMAL
COLOR UR: YELLOW
DIFFERENTIAL METHOD BLD: ABNORMAL
EOSINOPHIL # BLD: 0.3 K/UL (ref 0–0.4)
EOSINOPHIL NFR BLD: 2 % (ref 0–5)
EPITH CASTS URNS QL MICRO: NORMAL /LPF (ref 0–5)
ERYTHROCYTE [DISTWIDTH] IN BLOOD BY AUTOMATED COUNT: 15.1 % (ref 11.6–14.5)
GLUCOSE UR STRIP.AUTO-MCNC: NEGATIVE MG/DL
HCG UR QL: NEGATIVE
HCT VFR BLD AUTO: 42.4 % (ref 35–45)
HGB BLD-MCNC: 13.6 G/DL (ref 12–16)
HGB UR QL STRIP: ABNORMAL
IMM GRANULOCYTES # BLD AUTO: 0 K/UL (ref 0–0.04)
IMM GRANULOCYTES NFR BLD AUTO: 0 % (ref 0–0.5)
KETONES UR QL STRIP.AUTO: NEGATIVE MG/DL
LEUKOCYTE ESTERASE UR QL STRIP.AUTO: NEGATIVE
LYMPHOCYTES # BLD: 3.4 K/UL (ref 0.9–3.6)
LYMPHOCYTES NFR BLD: 29 % (ref 21–52)
MCH RBC QN AUTO: 26.7 PG (ref 24–34)
MCHC RBC AUTO-ENTMCNC: 32.1 G/DL (ref 31–37)
MCV RBC AUTO: 83.1 FL (ref 78–100)
MONOCYTES # BLD: 0.7 K/UL (ref 0.05–1.2)
MONOCYTES NFR BLD: 6 % (ref 3–10)
NEUTS SEG # BLD: 7.3 K/UL (ref 1.8–8)
NEUTS SEG NFR BLD: 63 % (ref 40–73)
NITRITE UR QL STRIP.AUTO: NEGATIVE
NRBC # BLD: 0 K/UL (ref 0–0.01)
NRBC BLD-RTO: 0 PER 100 WBC
PH UR STRIP: 6.5 [PH] (ref 5–8)
PLATELET # BLD AUTO: 449 K/UL (ref 135–420)
PMV BLD AUTO: 9.1 FL (ref 9.2–11.8)
PROT UR STRIP-MCNC: 30 MG/DL
RBC # BLD AUTO: 5.1 M/UL (ref 4.2–5.3)
RBC #/AREA URNS HPF: NORMAL /HPF (ref 0–5)
SP GR UR REFRACTOMETRY: 1.02 (ref 1–1.03)
SPECIMEN EXP DATE BLD: NORMAL
UROBILINOGEN UR QL STRIP.AUTO: 0.2 EU/DL (ref 0.2–1)
WBC # BLD AUTO: 11.8 K/UL (ref 4.6–13.2)
WBC URNS QL MICRO: NORMAL /HPF (ref 0–4)

## 2022-01-13 PROCEDURE — 86900 BLOOD TYPING SEROLOGIC ABO: CPT

## 2022-01-13 PROCEDURE — 81001 URINALYSIS AUTO W/SCOPE: CPT

## 2022-01-13 PROCEDURE — 85025 COMPLETE CBC W/AUTO DIFF WBC: CPT

## 2022-01-13 PROCEDURE — 81025 URINE PREGNANCY TEST: CPT

## 2022-01-13 PROCEDURE — 99283 EMERGENCY DEPT VISIT LOW MDM: CPT

## 2022-01-13 PROCEDURE — 74011250637 HC RX REV CODE- 250/637: Performed by: PHYSICIAN ASSISTANT

## 2022-01-13 RX ORDER — ONDANSETRON 4 MG/1
4 TABLET, ORALLY DISINTEGRATING ORAL
Status: COMPLETED | OUTPATIENT
Start: 2022-01-13 | End: 2022-01-13

## 2022-01-13 RX ORDER — ONDANSETRON 4 MG/1
4 TABLET, FILM COATED ORAL
Qty: 20 TABLET | Refills: 0 | Status: SHIPPED | OUTPATIENT
Start: 2022-01-13

## 2022-01-13 RX ORDER — ASCORBIC ACID 500 MG
TABLET ORAL
COMMUNITY

## 2022-01-13 RX ORDER — GLUCOSAMINE/CHONDR SU A SOD 750-600 MG
TABLET ORAL
COMMUNITY

## 2022-01-13 RX ORDER — LANOLIN ALCOHOL/MO/W.PET/CERES
CREAM (GRAM) TOPICAL
COMMUNITY

## 2022-01-13 RX ORDER — INFLIXIMAB 100 MG/10ML
5 INJECTION, POWDER, LYOPHILIZED, FOR SOLUTION INTRAVENOUS
COMMUNITY

## 2022-01-13 RX ADMIN — ONDANSETRON 4 MG: 4 TABLET, ORALLY DISINTEGRATING ORAL at 12:30

## 2022-01-13 NOTE — Clinical Note
[address] [facility] EMERGENCY DEPT  [id number] [facility] [id number] [phone]    Work/School Note    Date: 1/13/2022    To Whom It May concern:    Ebony Alejandro was seen and treated today in the emergency room by the following provider(s):  Attending Provider: Jamarcus Denis MD  Physician Assistant: ABDOUL Wilson. Ebony Alejandro is excused from work/school on 01/13/22 and 01/14/22. She is medically clear to return to work/school on 1/15/2022.        Sincerely,          ABDOUL Garcia

## 2022-01-13 NOTE — ED TRIAGE NOTES
Patient advises that she has PCOS. She states having menstrual period with clots x two weeks. She states having dizziness and nausea.

## 2022-01-13 NOTE — ED PROVIDER NOTES
EMERGENCY DEPARTMENT HISTORY AND PHYSICAL EXAM    Date: 1/13/2022  Patient Name: Cynthia Silva    History of Presenting Illness     Chief Complaint   Patient presents with    Menstrual Problem    Dizziness    Nausea         History Provided By: Patient    Chief Complaint: Heavy menstrual bleeding, dizziness and nausea  Duration: 2 weeks  Timing: Constant  Location: Vagina  Quality: Clots  Severity: Moderate  Modifying Factors: None  Associated Symptoms: none       Additional History (Context): Cynthia Silva is a 32 y.o. female with a history of menorrhagia and PCOS who presents today for issues listed above. Patient reports that she has been trying to get pregnant. States she did take a pregnancy test at home which was negative. Reports she is a G0, P0. Reports abnormal heavy menstrual bleeding. Reports after roughly 2 weeks of bleeding she now has some nausea and mild dizziness. Denies any shortness of breath, chest pain or syncope. Has been trying Tylenol and Motrin at home. Has not been seen by OB/GYN for this. Denies any vaginal discharge or concern for STDs. PCP: Alexandria Ochoa PA-C    Current Outpatient Medications   Medication Sig Dispense Refill    COLLAGEN by Does Not Apply route.  Biotin 2,500 mcg cap Take  by mouth.  ascorbic acid, vitamin C, (Vitamin C) 500 mg tablet Take  by mouth.  ferrous sulfate (Iron) 325 mg (65 mg iron) tablet Take  by mouth Daily (before breakfast).  OTHER Natural supplement for PCOS      ondansetron hcl (Zofran) 4 mg tablet Take 1 Tablet by mouth every eight (8) hours as needed for Nausea or Vomiting. 20 Tablet 0    inFLIXimab (Remicade) 100 mg injection 5 mg/kg by IntraVENous route.          Past History     Past Medical History:  Past Medical History:   Diagnosis Date    Anxiety and depression     Asthma     Hidradenitis suppurativa     Menorrhagia     Migraine     PCOS (polycystic ovarian syndrome)     Tobacco abuse Started around age 15 years       Past Surgical History:  Past Surgical History:   Procedure Laterality Date    HX OTHER SURGICAL      excision pilonidal cyst    [place] DRAIN PILONIDAL CYST COMPLIC  [id number]    Dr. Megan Gutierrez N/A 10/21/2016    Dr. Matthew Rey       Family History:  Family History   Problem Relation Age of Onset    Hypertension Mother     Other Mother         anxiety, fibromyalgia    Cancer Paternal Grandmother 54        ovarian       Social History:  Social History     Tobacco Use    Smoking status: Former Smoker     Packs/day: 0.25     Types: Cigarettes     Start date: 1/1/2009    Smokeless tobacco: Never Used   Substance Use Topics    Alcohol use: Yes     Alcohol/week: 2.0 standard drinks     Types: 2 Shots of liquor per week     Comment: rarely    Drug use: Yes     Types: Marijuana       Allergies: Allergies   Allergen Reactions    Amoxicillin Rash    Codeine Other (comments)     \"mean\"           Review of Systems   Review of Systems   Constitutional: Negative for chills and fever. HENT: Negative for congestion, rhinorrhea and sore throat. Respiratory: Negative for cough and shortness of breath. Cardiovascular: Negative for chest pain. Gastrointestinal: Positive for nausea. Negative for abdominal pain, blood in stool, constipation, diarrhea and vomiting. Genitourinary: Positive for vaginal bleeding. Negative for dysuria, frequency and hematuria. Musculoskeletal: Negative for back pain and myalgias. Skin: Negative for rash and wound. Neurological: Positive for dizziness. Negative for headaches. All other systems reviewed and are negative. All Other Systems Negative  Physical Exam     Vitals:    01/13/22 1158   BP: (!) 152/104   Pulse: 81   Resp: 20   Temp: 98.8 °F (37.1 °C)   SpO2: 100%   Weight: 128.8 kg (284 lb)   Height: 5' 4\" (1.626 m)     Physical Exam  Vitals and nursing note reviewed.    Constitutional:       General: She is not in acute distress. Appearance: She is well-developed. She is not diaphoretic. Comments: Well appearing     HENT:      Head: Normocephalic and atraumatic. Eyes:      Conjunctiva/sclera: Conjunctivae normal.   Cardiovascular:      Rate and Rhythm: Normal rate and regular rhythm. Heart sounds: Normal heart sounds. Pulmonary:      Effort: Pulmonary effort is normal. No respiratory distress. Breath sounds: Normal breath sounds. Chest:      Chest wall: No tenderness. Abdominal:      General: Bowel sounds are normal. There is no distension. Palpations: Abdomen is soft. Tenderness: There is no abdominal tenderness. There is no guarding or rebound. Musculoskeletal:         General: No deformity. Cervical back: Normal range of motion and neck supple. Skin:     General: Skin is warm and dry. Neurological:      Mental Status: She is alert and oriented to person, place, and time. Deep Tendon Reflexes: Reflexes are normal and symmetric.            Diagnostic Study Results     Labs -     Recent Results (from the past 12 hour(s))   URINALYSIS W/ RFLX MICROSCOPIC    Collection Time: 01/13/22 12:10 PM   Result Value Ref Range    Color YELLOW      Appearance CLEAR      Specific gravity 1.025 1.003 - 1.030      pH (UA) 6.5 5.0 - 8.0      Protein 30 (A) NEG mg/dL    Glucose Negative NEG mg/dL    Ketone Negative NEG mg/dL    Bilirubin Negative NEG      Blood MODERATE (A) NEG      Urobilinogen 0.2 0.2 - 1.0 EU/dL    Nitrites Negative NEG      Leukocyte Esterase Negative NEG     HCG URINE, QL    Collection Time: 01/13/22 12:10 PM   Result Value Ref Range    HCG urine, QL Negative NEG     CBC WITH AUTOMATED DIFF    Collection Time: 01/13/22 12:10 PM   Result Value Ref Range    WBC 11.8 4.6 - 13.2 K/uL    RBC 5.10 4.20 - 5.30 M/uL    HGB 13.6 12.0 - 16.0 g/dL    HCT 42.4 35.0 - 45.0 %    MCV 83.1 78.0 - 100.0 FL    MCH 26.7 24.0 - 34.0 PG    MCHC 32.1 31.0 - 37.0 g/dL    RDW 15.1 (H) 11.6 - 14.5 %    PLATELET 029 (H) 932 - 420 K/uL    MPV 9.1 (L) 9.2 - 11.8 FL    NRBC 0.0 0  WBC    ABSOLUTE NRBC 0.00 0.00 - 0.01 K/uL    NEUTROPHILS 63 40 - 73 %    LYMPHOCYTES 29 21 - 52 %    MONOCYTES 6 3 - 10 %    EOSINOPHILS 2 0 - 5 %    BASOPHILS 0 0 - 2 %    IMMATURE GRANULOCYTES 0 0.0 - 0.5 %    ABS. NEUTROPHILS 7.3 1.8 - 8.0 K/UL    ABS. LYMPHOCYTES 3.4 0.9 - 3.6 K/UL    ABS. MONOCYTES 0.7 0.05 - 1.2 K/UL    ABS. EOSINOPHILS 0.3 0.0 - 0.4 K/UL    ABS. BASOPHILS 0.0 0.0 - 0.1 K/UL    ABS. IMM. GRANS. 0.0 0.00 - 0.04 K/UL    DF AUTOMATED     URINE MICROSCOPIC ONLY    Collection Time: 22 12:10 PM   Result Value Ref Range    WBC 0 to 3 0 - 4 /hpf    RBC 4 to 10 0 - 5 /hpf    Epithelial cells FEW 0 - 5 /lpf    Bacteria Negative NEG /hpf       Radiologic Studies -   No orders to display     CT Results  (Last 48 hours)    None        CXR Results  (Last 48 hours)    None            Medical Decision Making   I am the first provider for this patient. I reviewed the vital signs, available nursing notes, past medical history, past surgical history, family history and social history. Vital Signs-Reviewed the patient's vital signs. Records Reviewed: Nursing Notes and Old Medical Records     Procedures: None   Procedures    Provider Notes (Medical Decision Making):     Differential Diagnosis:  Menses, AUB/DUB, , normal pregnancy, menorrhagia, dysmenorrhea     Plan: We will order CBC/type and screen, UA and urine pregnancy. Patient reports if she should need a blood transfusion she would except.      1:52 PM  Pregnancy was negative. CBC is reassuring. Will discharge home with Zofran and have advised close OB/GYN follow-up. Will discharge home. MED RECONCILIATION:  No current facility-administered medications for this encounter. Current Outpatient Medications   Medication Sig    COLLAGEN by Does Not Apply route.  Biotin 2,500 mcg cap Take  by mouth.     ascorbic acid, vitamin C, (Vitamin C) 500 mg tablet Take  by mouth.  ferrous sulfate (Iron) 325 mg (65 mg iron) tablet Take  by mouth Daily (before breakfast).  OTHER Natural supplement for PCOS    ondansetron hcl (Zofran) 4 mg tablet Take 1 Tablet by mouth every eight (8) hours as needed for Nausea or Vomiting.  inFLIXimab (Remicade) 100 mg injection 5 mg/kg by IntraVENous route. Disposition:  Home     DISCHARGE NOTE:   Pt has been reexamined. Patient has no new complaints, changes, or physical findings. Care plan outlined and precautions discussed. Results of workup were reviewed with the patient. All medications were reviewed with the patient. All of pt's questions and concerns were addressed. Patient was instructed and agrees to follow up with PCP/OBGYN as well as to return to the ED upon further deterioration. Patient is ready to go home. Follow-up Information     Follow up With Specialties Details Why Contact Info    [facility] EMERGENCY DEPT Emergency Medicine  As needed [address] [place]    Jaime Douglas PA-C Physician Assistant Schedule an appointment as soon as possible for a visit   [facility] [address]  [phone]            Current Discharge Medication List      START taking these medications    Details   ondansetron hcl (Zofran) 4 mg tablet Take 1 Tablet by mouth every eight (8) hours as needed for Nausea or Vomiting. Qty: 20 Tablet, Refills: 0  Start date: 1/13/2022                 Diagnosis     Clinical Impression:   1. Dysmenorrhea    2. Menorrhagia with irregular cycle          \"Please note that this dictation was completed with [employer], the computer voice recognition software. Quite often unanticipated grammatical, syntax, homophones, and other interpretive errors are inadvertently transcribed by the computer software. Please disregard these errors. Please excuse any errors that have escaped final proofreading. \"

## 2022-01-13 NOTE — Clinical Note
[address] [facility] EMERGENCY DEPT  [address] [address] [phone]    Work/School Note    Date: 1/13/2022    To Whom It May concern:    Opal Menezes was seen and treated today in the emergency room by the following provider(s):  Attending Provider: Diego Mireles MD  Physician Assistant: ABDOUL Sandhu. Opal Menezes is excused from work/school on 01/13/22 and 01/14/22. She is medically clear to return to work/school on 1/15/2022.        Sincerely,          ABDOUL Crooks

## 2022-03-18 PROBLEM — L73.2 HIDRADENITIS SUPPURATIVA: Status: ACTIVE | Noted: 2017-04-11

## 2022-03-19 PROBLEM — E66.01 CLASS 3 SEVERE OBESITY WITH BODY MASS INDEX (BMI) OF 45.0 TO 49.9 IN ADULT: Status: ACTIVE | Noted: 2018-10-11

## 2022-03-19 PROBLEM — E66.813 CLASS 3 SEVERE OBESITY WITH BODY MASS INDEX (BMI) OF 45.0 TO 49.9 IN ADULT (HCC): Status: ACTIVE | Noted: 2018-10-11

## 2022-03-20 PROBLEM — E66.01 OBESITY, MORBID: Status: ACTIVE | Noted: 2018-05-09

## 2022-08-09 ENCOUNTER — HOSPITAL ENCOUNTER (EMERGENCY)
Age: 27
Discharge: HOME | End: 2022-08-09
Attending: STUDENT IN AN ORGANIZED HEALTH CARE EDUCATION/TRAINING PROGRAM
Payer: MEDICAID

## 2022-08-09 ENCOUNTER — APPOINTMENT (OUTPATIENT)
Dept: GENERAL RADIOLOGY | Age: 27
End: 2022-08-09
Attending: PHYSICIAN ASSISTANT
Payer: MEDICAID

## 2022-08-09 VITALS
BODY MASS INDEX: 48.49 KG/M2 | OXYGEN SATURATION: 100 % | HEIGHT: 64 IN | TEMPERATURE: 98.2 F | DIASTOLIC BLOOD PRESSURE: 97 MMHG | SYSTOLIC BLOOD PRESSURE: 138 MMHG | RESPIRATION RATE: 24 BRPM | WEIGHT: 284 LBS | HEART RATE: 78 BPM

## 2022-08-09 DIAGNOSIS — R10.9 FLANK PAIN: ICD-10-CM

## 2022-08-09 DIAGNOSIS — B37.31 VAGINAL YEAST INFECTION: ICD-10-CM

## 2022-08-09 DIAGNOSIS — N30.01 ACUTE CYSTITIS WITH HEMATURIA: Primary | ICD-10-CM

## 2022-08-09 DIAGNOSIS — L73.2 HYDRADENITIS: ICD-10-CM

## 2022-08-09 LAB
ALBUMIN SERPL-MCNC: 3.4 G/DL (ref 3.4–5)
ALBUMIN/GLOB SERPL: 0.6 {RATIO} (ref 0.8–1.7)
ALP SERPL-CCNC: 89 U/L (ref 45–117)
ALT SERPL-CCNC: 32 U/L (ref 13–56)
ANION GAP SERPL CALC-SCNC: 9 MMOL/L (ref 3–18)
APPEARANCE UR: ABNORMAL
AST SERPL-CCNC: 17 U/L (ref 10–38)
BACTERIA URNS QL MICRO: ABNORMAL /HPF
BASOPHILS # BLD: 0.1 K/UL (ref 0–0.1)
BASOPHILS NFR BLD: 0 % (ref 0–2)
BILIRUB SERPL-MCNC: 0.4 MG/DL (ref 0.2–1)
BILIRUB UR QL: NEGATIVE
BNP SERPL-MCNC: 84 PG/ML (ref 0–450)
BUN SERPL-MCNC: 9 MG/DL (ref 7–18)
BUN/CREAT SERPL: 9 (ref 12–20)
CALCIUM SERPL-MCNC: 9.2 MG/DL (ref 8.5–10.1)
CHLORIDE SERPL-SCNC: 108 MMOL/L (ref 100–111)
CO2 SERPL-SCNC: 23 MMOL/L (ref 21–32)
COLOR UR: YELLOW
CREAT SERPL-MCNC: 0.95 MG/DL (ref 0.6–1.3)
DIFFERENTIAL METHOD BLD: ABNORMAL
EOSINOPHIL # BLD: 0.2 K/UL (ref 0–0.4)
EOSINOPHIL NFR BLD: 1 % (ref 0–5)
EPITH CASTS URNS QL MICRO: ABNORMAL /LPF (ref 0–5)
ERYTHROCYTE [DISTWIDTH] IN BLOOD BY AUTOMATED COUNT: 14.7 % (ref 11.6–14.5)
GLOBULIN SER CALC-MCNC: 5.3 G/DL (ref 2–4)
GLUCOSE SERPL-MCNC: 85 MG/DL (ref 74–99)
GLUCOSE UR STRIP.AUTO-MCNC: NEGATIVE MG/DL
HCG SERPL QL: NEGATIVE
HCG UR QL: NEGATIVE
HCT VFR BLD AUTO: 39.7 % (ref 35–45)
HGB BLD-MCNC: 12.7 G/DL (ref 12–16)
HGB UR QL STRIP: ABNORMAL
IMM GRANULOCYTES # BLD AUTO: 0 K/UL (ref 0–0.04)
IMM GRANULOCYTES NFR BLD AUTO: 0 % (ref 0–0.5)
KETONES UR QL STRIP.AUTO: ABNORMAL MG/DL
LEUKOCYTE ESTERASE UR QL STRIP.AUTO: ABNORMAL
LYMPHOCYTES # BLD: 3.8 K/UL (ref 0.9–3.6)
LYMPHOCYTES NFR BLD: 29 % (ref 21–52)
MCH RBC QN AUTO: 26.2 PG (ref 24–34)
MCHC RBC AUTO-ENTMCNC: 32 G/DL (ref 31–37)
MCV RBC AUTO: 82 FL (ref 78–100)
MONOCYTES # BLD: 0.7 K/UL (ref 0.05–1.2)
MONOCYTES NFR BLD: 5 % (ref 3–10)
MUCOUS THREADS URNS QL MICRO: ABNORMAL /LPF
NEUTS SEG # BLD: 8.3 K/UL (ref 1.8–8)
NEUTS SEG NFR BLD: 64 % (ref 40–73)
NITRITE UR QL STRIP.AUTO: NEGATIVE
NRBC # BLD: 0 K/UL (ref 0–0.01)
NRBC BLD-RTO: 0 PER 100 WBC
PH UR STRIP: 6.5 [PH] (ref 5–8)
PLATELET # BLD AUTO: 443 K/UL (ref 135–420)
PMV BLD AUTO: 9.5 FL (ref 9.2–11.8)
POTASSIUM SERPL-SCNC: 4 MMOL/L (ref 3.5–5.5)
PROT SERPL-MCNC: 8.7 G/DL (ref 6.4–8.2)
PROT UR STRIP-MCNC: 30 MG/DL
RBC # BLD AUTO: 4.84 M/UL (ref 4.2–5.3)
RBC #/AREA URNS HPF: ABNORMAL /HPF (ref 0–5)
SODIUM SERPL-SCNC: 140 MMOL/L (ref 136–145)
SP GR UR REFRACTOMETRY: 1.02 (ref 1–1.03)
TROPONIN-HIGH SENSITIVITY: <3 NG/L (ref 0–54)
UROBILINOGEN UR QL STRIP.AUTO: 1 EU/DL (ref 0.2–1)
WBC # BLD AUTO: 13 K/UL (ref 4.6–13.2)
WBC URNS QL MICRO: ABNORMAL /HPF (ref 0–4)
YEAST URNS QL MICRO: ABNORMAL

## 2022-08-09 PROCEDURE — 85025 COMPLETE CBC W/AUTO DIFF WBC: CPT

## 2022-08-09 PROCEDURE — 99285 EMERGENCY DEPT VISIT HI MDM: CPT

## 2022-08-09 PROCEDURE — 80053 COMPREHEN METABOLIC PANEL: CPT

## 2022-08-09 PROCEDURE — 84703 CHORIONIC GONADOTROPIN ASSAY: CPT

## 2022-08-09 PROCEDURE — 93005 ELECTROCARDIOGRAM TRACING: CPT

## 2022-08-09 PROCEDURE — 81025 URINE PREGNANCY TEST: CPT

## 2022-08-09 PROCEDURE — 83880 ASSAY OF NATRIURETIC PEPTIDE: CPT

## 2022-08-09 PROCEDURE — 71045 X-RAY EXAM CHEST 1 VIEW: CPT

## 2022-08-09 PROCEDURE — 84484 ASSAY OF TROPONIN QUANT: CPT

## 2022-08-09 PROCEDURE — 74011250637 HC RX REV CODE- 250/637: Performed by: PHYSICIAN ASSISTANT

## 2022-08-09 PROCEDURE — 81001 URINALYSIS AUTO W/SCOPE: CPT

## 2022-08-09 RX ORDER — ASPIRIN 325 MG
1 TABLET, DELAYED RELEASE (ENTERIC COATED) ORAL
Qty: 20 G | Refills: 0 | Status: SHIPPED | OUTPATIENT
Start: 2022-08-09

## 2022-08-09 RX ORDER — FLUCONAZOLE 150 MG/1
150 TABLET ORAL
Status: COMPLETED | OUTPATIENT
Start: 2022-08-09 | End: 2022-08-09

## 2022-08-09 RX ORDER — OXYCODONE AND ACETAMINOPHEN 5; 325 MG/1; MG/1
1 TABLET ORAL
Qty: 15 TABLET | Refills: 0 | Status: SHIPPED | OUTPATIENT
Start: 2022-08-09 | End: 2022-08-16

## 2022-08-09 RX ORDER — ONDANSETRON 4 MG/1
4 TABLET, ORALLY DISINTEGRATING ORAL
Qty: 20 TABLET | Refills: 0 | Status: SHIPPED | OUTPATIENT
Start: 2022-08-09

## 2022-08-09 RX ORDER — SULFAMETHOXAZOLE AND TRIMETHOPRIM 800; 160 MG/1; MG/1
1 TABLET ORAL 2 TIMES DAILY
Qty: 20 TABLET | Refills: 0 | Status: SHIPPED | OUTPATIENT
Start: 2022-08-09 | End: 2022-08-19

## 2022-08-09 RX ORDER — ONDANSETRON 8 MG/1
8 TABLET, ORALLY DISINTEGRATING ORAL
Status: COMPLETED | OUTPATIENT
Start: 2022-08-09 | End: 2022-08-09

## 2022-08-09 RX ORDER — OXYCODONE AND ACETAMINOPHEN 5; 325 MG/1; MG/1
1 TABLET ORAL
Status: COMPLETED | OUTPATIENT
Start: 2022-08-09 | End: 2022-08-09

## 2022-08-09 RX ORDER — SULFAMETHOXAZOLE AND TRIMETHOPRIM 800; 160 MG/1; MG/1
1 TABLET ORAL
Status: COMPLETED | OUTPATIENT
Start: 2022-08-09 | End: 2022-08-09

## 2022-08-09 RX ADMIN — OXYCODONE AND ACETAMINOPHEN 1 TABLET: 325; 5 TABLET ORAL at 20:52

## 2022-08-09 RX ADMIN — SULFAMETHOXAZOLE AND TRIMETHOPRIM 1 TABLET: 800; 160 TABLET ORAL at 20:33

## 2022-08-09 RX ADMIN — ONDANSETRON 8 MG: 8 TABLET, ORALLY DISINTEGRATING ORAL at 20:52

## 2022-08-09 RX ADMIN — FLUCONAZOLE 150 MG: 150 TABLET ORAL at 20:33

## 2022-08-09 NOTE — ED TRIAGE NOTES
Pt reports right flank/upper back pain since yesterday along with shortness of breath and chest tightness. Pt also reports \"ovary\" times  4 months on the right side.  Pt reports history of hidradenitis and state open areas noted on right flank/back area and under both breast

## 2022-08-10 LAB
ATRIAL RATE: 103 BPM
CALCULATED P AXIS, ECG09: 49 DEGREES
CALCULATED R AXIS, ECG10: 45 DEGREES
CALCULATED T AXIS, ECG11: 8 DEGREES
DIAGNOSIS, 93000: NORMAL
P-R INTERVAL, ECG05: 122 MS
Q-T INTERVAL, ECG07: 336 MS
QRS DURATION, ECG06: 82 MS
QTC CALCULATION (BEZET), ECG08: 440 MS
VENTRICULAR RATE, ECG03: 103 BPM

## 2022-08-10 NOTE — DISCHARGE INSTRUCTIONS
[employer] Activation    Thank you for requesting access to [facility]. Please follow the instructions below to securely access and download your online medical record. [facility] allows you to send messages to your doctor, view your test results, renew your prescriptions, schedule appointments, and more. How Do I Sign Up? In your internet browser, go to [email or web address]  Click on the First Time User? Click Here link in the Sign In box. You will be redirect to the New Member Sign Up page. Enter your [facility] Access Code exactly as it appears below. You will not need to use this code after youve completed the sign-up process. If you do not sign up before the expiration date, you must request a new code. [facility] Access Code: Activation code not generated  Current [facility] Status: Active (This is the date your [facility] access code will )    Enter the last four digits of your Social Security Number (xxxx) and Date of Birth (mm/dd/yyyy) as indicated and click Submit. You will be taken to the next sign-up page. Create a [facility] ID. This will be your [facility] login ID and cannot be changed, so think of one that is secure and easy to remember. Create a [facility] password. You can change your password at any time. Enter your Password Reset Question and Answer. This can be used at a later time if you forget your password. Enter your e-mail address. You will receive e-mail notification when new information is available in [address] Click Sign Up. You can now view and download portions of your medical record. Click the [place] [facility] link to download a portable copy of your medical information. Additional Information    If you have questions, please visit the Frequently Asked Questions section of the [facility] website at [email or web address] Remember, [facility] is NOT to be used for urgent needs. For medical emergencies, dial 911.

## 2022-08-10 NOTE — ED PROVIDER NOTES
EMERGENCY DEPARTMENT HISTORY AND PHYSICAL EXAM    Date: 8/9/2022  Patient Name: Giselle Moilna    History of Presenting Illness     Chief Complaint   Patient presents with    Shortness of Breath    Back Pain    Skin Problem         History Provided By: patient     Chief Complaint: flank/back pain, hydradenitis flare up   Duration: few days  Timing:  acute  Location: R flank   Quality: sharp pain   Severity: moderate to severe   Modifying Factors: none   Associated Symptoms: hydradenitis, open wounds, flank pain      Additional History (Context): Giselle Molina is a 32 y.o. female with PMH 30, depression, asthma, migraine headaches, PCOS, and hidradenitis currently on Remicade infusions who presents with complaints of a flareup of her hidradenitis to the right flank region with associated pain in that area. Patient states she has increased pain to the right flank with deep inspiration. Denies cough or cold symptoms. Denies any known sick exposures. Patient denies taking any medications for symptoms prior to arrival.  No other complaints reported. PCP: Carol Luna PA-C    Current Facility-Administered Medications   Medication Dose Route Frequency Provider Last Rate Last Admin    trimethoprim-sulfamethoxazole (BACTRIM DS, SEPTRA DS) 160-800 mg per tablet 1 Tablet  1 Tablet Oral NOW Stella Lincoln PA-C        fluconazole (DIFLUCAN) tablet 150 mg  150 mg Oral NOW Stella Lincoln PA-C        oxyCODONE-acetaminophen (PERCOCET) 5-325 mg per tablet 1 Tablet  1 Tablet Oral NOW Stella Lincoln PA-C        ondansetron (ZOFRAN ODT) tablet 8 mg  8 mg Oral NOW Stella Lincoln PA-C         Current Outpatient Medications   Medication Sig Dispense Refill    clotrimazole (MYCELEX) 1 % vaginal cream Insert 1 Applicator into vagina nightly. 20 g 0    trimethoprim-sulfamethoxazole (Bactrim DS) 160-800 mg per tablet Take 1 Tablet by mouth two (2) times a day for 10 days.  20 Tablet 0    oxyCODONE-acetaminophen (Percocet) 5-325 mg per tablet Take 1 Tablet by mouth every six (6) hours as needed for Pain for up to 7 days. Max Daily Amount: 4 Tablets. 15 Tablet 0    ondansetron (ZOFRAN ODT) 4 mg disintegrating tablet Take 1 Tablet by mouth every eight (8) hours as needed for Nausea or Vomiting. 20 Tablet 0    inFLIXimab (Remicade) 100 mg injection 5 mg/kg by IntraVENous route. COLLAGEN by Does Not Apply route. Biotin 2,500 mcg cap Take  by mouth. ascorbic acid, vitamin C, (Vitamin C) 500 mg tablet Take  by mouth. ferrous sulfate (Iron) 325 mg (65 mg iron) tablet Take  by mouth Daily (before breakfast). OTHER Natural supplement for PCOS      ondansetron hcl (Zofran) 4 mg tablet Take 1 Tablet by mouth every eight (8) hours as needed for Nausea or Vomiting. 20 Tablet 0       Past History     Past Medical History:  Past Medical History:   Diagnosis Date    Anxiety and depression     Asthma     Hidradenitis suppurativa     Menorrhagia     Migraine     PCOS (polycystic ovarian syndrome)     Tobacco abuse     Started around age 15 years       Past Surgical History:  Past Surgical History:   Procedure Laterality Date    HX OTHER SURGICAL      excision pilonidal cyst    AR DRAIN PILONIDAL CYST COMPLIC  8-85-02    Dr. Wadsworth Fee N/A 10/21/2016    Dr. Ronnie Mckeon       Family History:  Family History   Problem Relation Age of Onset    Hypertension Mother     Other Mother         anxiety, fibromyalgia    Cancer Paternal Grandmother 54        ovarian       Social History:  Social History     Tobacco Use    Smoking status: Former     Packs/day: 0.25     Types: Cigarettes     Start date: 1/1/2009    Smokeless tobacco: Never   Substance Use Topics    Alcohol use: Yes     Alcohol/week: 2.0 standard drinks     Types: 2 Shots of liquor per week     Comment: rarely    Drug use: Yes     Types: Marijuana       Allergies:   Allergies   Allergen Reactions    Amoxicillin Rash    Codeine Other (comments)     \"mean\"           Review of Systems   Review of Systems   Constitutional: Negative. Negative for chills and fever. HENT: Negative. Negative for congestion, ear pain and rhinorrhea. Eyes: Negative. Negative for pain and redness. Respiratory:  Negative for cough, wheezing and stridor. Flank pain with deep inspiration   Cardiovascular: Negative. Negative for chest pain and leg swelling. Gastrointestinal: Negative. Negative for abdominal pain, constipation, diarrhea, nausea and vomiting. Genitourinary:  Positive for flank pain. Negative for dysuria and frequency. Musculoskeletal:  Positive for back pain. Negative for neck pain. Skin:  Positive for wound. Negative for rash. Neurological: Negative. Negative for dizziness, seizures, syncope and headaches. All other systems reviewed and are negative. All Other Systems Negative  Physical Exam     Vitals:    08/09/22 1759   BP: (!) 138/97   Pulse: 78   Resp: 24   Temp: 98.2 °F (36.8 °C)   SpO2: 100%   Weight: 128.8 kg (284 lb)   Height: 5' 4\" (1.626 m)     Physical Exam  Vitals and nursing note reviewed. Constitutional:       General: She is in acute distress. Appearance: She is well-developed. She is obese. She is not diaphoretic. HENT:      Head: Normocephalic and atraumatic. Eyes:      General: No scleral icterus. Right eye: No discharge. Left eye: No discharge. Conjunctiva/sclera: Conjunctivae normal.   Cardiovascular:      Rate and Rhythm: Normal rate and regular rhythm. Heart sounds: Normal heart sounds. No murmur heard. No friction rub. No gallop. Pulmonary:      Effort: Pulmonary effort is normal. No respiratory distress. Breath sounds: Normal breath sounds. No stridor. No wheezing, rhonchi or rales. Musculoskeletal:         General: Normal range of motion. Cervical back: Normal range of motion and neck supple. Skin:     General: Skin is warm and dry.       Comments: Multiple areas of active hydradenitis/open wounds to the R flank, between and under the breasts bilaterally,as well as to the pilonidal region and bilateral inguinal region. Neurological:      General: No focal deficit present. Mental Status: She is alert and oriented to person, place, and time. Mental status is at baseline. Coordination: Coordination normal.      Comments: Gait is steady and patient exhibits no evidence of ataxia. Patient is able to ambulate without difficulty. No focal neurological deficit noted. No facial droop, slurred speech, or evidence of altered mentation noted on exam.       Psychiatric:         Behavior: Behavior normal.         Thought Content: Thought content normal.              Diagnostic Study Results     Labs -     Recent Results (from the past 12 hour(s))   CBC WITH AUTOMATED DIFF    Collection Time: 08/09/22  6:10 PM   Result Value Ref Range    WBC 13.0 4.6 - 13.2 K/uL    RBC 4.84 4.20 - 5.30 M/uL    HGB 12.7 12.0 - 16.0 g/dL    HCT 39.7 35.0 - 45.0 %    MCV 82.0 78.0 - 100.0 FL    MCH 26.2 24.0 - 34.0 PG    MCHC 32.0 31.0 - 37.0 g/dL    RDW 14.7 (H) 11.6 - 14.5 %    PLATELET 179 (H) 885 - 420 K/uL    MPV 9.5 9.2 - 11.8 FL    NRBC 0.0 0  WBC    ABSOLUTE NRBC 0.00 0.00 - 0.01 K/uL    NEUTROPHILS 64 40 - 73 %    LYMPHOCYTES 29 21 - 52 %    MONOCYTES 5 3 - 10 %    EOSINOPHILS 1 0 - 5 %    BASOPHILS 0 0 - 2 %    IMMATURE GRANULOCYTES 0 0.0 - 0.5 %    ABS. NEUTROPHILS 8.3 (H) 1.8 - 8.0 K/UL    ABS. LYMPHOCYTES 3.8 (H) 0.9 - 3.6 K/UL    ABS. MONOCYTES 0.7 0.05 - 1.2 K/UL    ABS. EOSINOPHILS 0.2 0.0 - 0.4 K/UL    ABS. BASOPHILS 0.1 0.0 - 0.1 K/UL    ABS. IMM.  GRANS. 0.0 0.00 - 0.04 K/UL    DF AUTOMATED     METABOLIC PANEL, COMPREHENSIVE    Collection Time: 08/09/22  6:10 PM   Result Value Ref Range    Sodium 140 136 - 145 mmol/L    Potassium 4.0 3.5 - 5.5 mmol/L    Chloride 108 100 - 111 mmol/L    CO2 23 21 - 32 mmol/L    Anion gap 9 3.0 - 18 mmol/L    Glucose 85 74 - 99 mg/dL    BUN 9 7.0 - 18 MG/DL    Creatinine 0.95 0.6 - 1.3 MG/DL    BUN/Creatinine ratio 9 (L) 12 - 20      GFR est AA >60 >60 ml/min/1.73m2    GFR est non-AA >60 >60 ml/min/1.73m2    Calcium 9.2 8.5 - 10.1 MG/DL    Bilirubin, total 0.4 0.2 - 1.0 MG/DL    ALT (SGPT) 32 13 - 56 U/L    AST (SGOT) 17 10 - 38 U/L    Alk. phosphatase 89 45 - 117 U/L    Protein, total 8.7 (H) 6.4 - 8.2 g/dL    Albumin 3.4 3.4 - 5.0 g/dL    Globulin 5.3 (H) 2.0 - 4.0 g/dL    A-G Ratio 0.6 (L) 0.8 - 1.7     TROPONIN-HIGH SENSITIVITY    Collection Time: 08/09/22  6:10 PM   Result Value Ref Range    Troponin-High Sensitivity <3 0 - 54 ng/L   NT-PRO BNP    Collection Time: 08/09/22  6:10 PM   Result Value Ref Range    NT pro-BNP 84 0 - 450 PG/ML   HCG QL SERUM    Collection Time: 08/09/22  6:10 PM   Result Value Ref Range    HCG, Ql. Negative NEG     HCG URINE, QL    Collection Time: 08/09/22  6:20 PM   Result Value Ref Range    HCG urine, QL Negative NEG     URINALYSIS W/ RFLX MICROSCOPIC    Collection Time: 08/09/22  6:20 PM   Result Value Ref Range    Color YELLOW      Appearance TURBID      Specific gravity 1.024 1.005 - 1.030      pH (UA) 6.5 5.0 - 8.0      Protein 30 (A) NEG mg/dL    Glucose Negative NEG mg/dL    Ketone TRACE (A) NEG mg/dL    Bilirubin Negative NEG      Blood TRACE (A) NEG      Urobilinogen 1.0 0.2 - 1.0 EU/dL    Nitrites Negative NEG      Leukocyte Esterase MODERATE (A) NEG     URINE MICROSCOPIC ONLY    Collection Time: 08/09/22  6:20 PM   Result Value Ref Range    WBC 4 to 10 0 - 4 /hpf    RBC 0 to 3 0 - 5 /hpf    Epithelial cells 4+ 0 - 5 /lpf    Bacteria 4+ (A) NEG /hpf    Mucus 2+ (A) NEG /lpf    Yeast FEW (A) NEG         Radiologic Studies -   XR CHEST SNGL V    (Results Pending)     CT Results  (Last 48 hours)      None          CXR Results  (Last 48 hours)      None              Medical Decision Making   I am the first provider for this patient.     I reviewed the vital signs, available nursing notes, past medical history, past surgical history, family history and social history. Vital Signs-Reviewed the patient's vital signs. Records Reviewed: Stella Lincoln PA-C     Procedures:  Procedures    Provider Notes (Medical Decision Making): Impression:  hydradenitis, UTI, vaginal yeast infection,     EKG: sinus tachycardia, rate 103, no STEMI or acute changes, reviewed by myself and the ED attending  Chest x-ray unremarkable  Labs essentially unremarkable, UA concerning for UTI as well as yeast contamination    Will treat with abx and diflucan/clotrimazole, pain control, and close pcp follow-up. Pt agrees. Stella Lincoln PA-C      MED RECONCILIATION:  Current Facility-Administered Medications   Medication Dose Route Frequency    trimethoprim-sulfamethoxazole (BACTRIM DS, SEPTRA DS) 160-800 mg per tablet 1 Tablet  1 Tablet Oral NOW    fluconazole (DIFLUCAN) tablet 150 mg  150 mg Oral NOW    oxyCODONE-acetaminophen (PERCOCET) 5-325 mg per tablet 1 Tablet  1 Tablet Oral NOW    ondansetron (ZOFRAN ODT) tablet 8 mg  8 mg Oral NOW     Current Outpatient Medications   Medication Sig    clotrimazole (MYCELEX) 1 % vaginal cream Insert 1 Applicator into vagina nightly. trimethoprim-sulfamethoxazole (Bactrim DS) 160-800 mg per tablet Take 1 Tablet by mouth two (2) times a day for 10 days. oxyCODONE-acetaminophen (Percocet) 5-325 mg per tablet Take 1 Tablet by mouth every six (6) hours as needed for Pain for up to 7 days. Max Daily Amount: 4 Tablets. ondansetron (ZOFRAN ODT) 4 mg disintegrating tablet Take 1 Tablet by mouth every eight (8) hours as needed for Nausea or Vomiting. inFLIXimab (Remicade) 100 mg injection 5 mg/kg by IntraVENous route. COLLAGEN by Does Not Apply route. Biotin 2,500 mcg cap Take  by mouth. ascorbic acid, vitamin C, (Vitamin C) 500 mg tablet Take  by mouth. ferrous sulfate (Iron) 325 mg (65 mg iron) tablet Take  by mouth Daily (before breakfast).     OTHER Natural supplement for PCOS    ondansetron hcl (Zofran) 4 mg tablet Take 1 Tablet by mouth every eight (8) hours as needed for Nausea or Vomiting. Disposition:  D/c    DISCHARGE NOTE:   Patient is stable for discharge at this time. I have discussed all the findings from today's work up with the patient, including lab results and imaging. I have answered all questions. Rx for percocet zofran bactrim and clotrimazole given. Rest and close follow-up with the PCP recommended this week. Return to the ED immediately for any new or worsening symptoms. April Ester Loaiza PA-C     Follow-up Information       Follow up With Specialties Details Why Contact Info    Muna Garcia PA-C Physician Assistant In 2 days  [address] [place] [address]  [phone]      [facility] EMERGENCY DEPT Emergency Medicine  As needed, If symptoms worsen [address] [place] [id number]  [phone]            Current Discharge Medication List        START taking these medications    Details   clotrimazole (MYCELEX) 1 % vaginal cream Insert 1 Applicator into vagina nightly. Qty: 20 g, Refills: 0  Start date: 8/9/2022      trimethoprim-sulfamethoxazole (Bactrim DS) 160-800 mg per tablet Take 1 Tablet by mouth two (2) times a day for 10 days. Qty: 20 Tablet, Refills: 0  Start date: 8/9/2022, End date: 8/19/2022      oxyCODONE-acetaminophen (Percocet) 5-325 mg per tablet Take 1 Tablet by mouth every six (6) hours as needed for Pain for up to 7 days. Max Daily Amount: 4 Tablets. Qty: 15 Tablet, Refills: 0  Start date: 8/9/2022, End date: 8/16/2022    Associated Diagnoses: Hydradenitis      ondansetron (ZOFRAN ODT) 4 mg disintegrating tablet Take 1 Tablet by mouth every eight (8) hours as needed for Nausea or Vomiting. Qty: 20 Tablet, Refills: 0  Start date: 8/9/2022               Diagnosis     Clinical Impression:   1. Acute cystitis with hematuria    2. Hydradenitis    3. Flank pain    4.  Vaginal yeast infection

## 2024-09-23 ENCOUNTER — HOSPITAL ENCOUNTER (EMERGENCY)
Facility: HOSPITAL | Age: 29
Discharge: HOME | End: 2024-09-23
Attending: EMERGENCY MEDICINE
Payer: MEDICARE

## 2024-09-23 VITALS
SYSTOLIC BLOOD PRESSURE: 151 MMHG | DIASTOLIC BLOOD PRESSURE: 81 MMHG | TEMPERATURE: 98 F | HEART RATE: 89 BPM | HEIGHT: 64 IN | RESPIRATION RATE: 18 BRPM | WEIGHT: 268 LBS | OXYGEN SATURATION: 100 % | BODY MASS INDEX: 45.75 KG/M2

## 2024-09-23 DIAGNOSIS — L05.91 CHRONIC RECURRENT PILONIDAL CYST: ICD-10-CM

## 2024-09-23 DIAGNOSIS — R68.89 SUSPECTED SOFT TISSUE INFECTION: ICD-10-CM

## 2024-09-23 DIAGNOSIS — L73.2 HIDRADENITIS SUPPURATIVA OF MULTIPLE SITES: Primary | ICD-10-CM

## 2024-09-23 LAB
ALBUMIN SERPL-MCNC: 3.5 G/DL (ref 3.4–5)
ALBUMIN/GLOB SERPL: 0.7 (ref 0.8–1.7)
ALP SERPL-CCNC: 108 U/L (ref 45–117)
ALT SERPL-CCNC: 16 U/L (ref 13–56)
ANION GAP SERPL CALC-SCNC: 6 MMOL/L (ref 3–18)
APPEARANCE UR: ABNORMAL
AST SERPL-CCNC: 13 U/L (ref 10–38)
BACTERIA URNS QL MICRO: ABNORMAL /HPF
BASOPHILS # BLD: 0.1 K/UL (ref 0–0.1)
BASOPHILS NFR BLD: 0 % (ref 0–2)
BILIRUB SERPL-MCNC: 0.2 MG/DL (ref 0.2–1)
BILIRUB UR QL: NEGATIVE
BUN SERPL-MCNC: 12 MG/DL (ref 7–18)
BUN/CREAT SERPL: 13 (ref 12–20)
CALCIUM SERPL-MCNC: 9.2 MG/DL (ref 8.5–10.1)
CHLORIDE SERPL-SCNC: 108 MMOL/L (ref 100–111)
CO2 SERPL-SCNC: 25 MMOL/L (ref 21–32)
COLOR UR: YELLOW
CREAT SERPL-MCNC: 0.93 MG/DL (ref 0.6–1.3)
DIFFERENTIAL METHOD BLD: ABNORMAL
EOSINOPHIL # BLD: 0.3 K/UL (ref 0–0.4)
EOSINOPHIL NFR BLD: 2 % (ref 0–5)
EPITH CASTS URNS QL MICRO: ABNORMAL /LPF (ref 0–5)
ERYTHROCYTE [DISTWIDTH] IN BLOOD BY AUTOMATED COUNT: 15 % (ref 11.6–14.5)
GLOBULIN SER CALC-MCNC: 5.3 G/DL (ref 2–4)
GLUCOSE SERPL-MCNC: 83 MG/DL (ref 74–99)
GLUCOSE UR STRIP.AUTO-MCNC: NEGATIVE MG/DL
HCT VFR BLD AUTO: 36.1 % (ref 35–45)
HGB BLD-MCNC: 11.6 G/DL (ref 12–16)
HGB UR QL STRIP: ABNORMAL
IMM GRANULOCYTES # BLD AUTO: 0 K/UL (ref 0–0.04)
IMM GRANULOCYTES NFR BLD AUTO: 0 % (ref 0–0.5)
KETONES UR QL STRIP.AUTO: NEGATIVE MG/DL
LACTATE SERPL-SCNC: 0.7 MMOL/L (ref 0.4–2)
LEUKOCYTE ESTERASE UR QL STRIP.AUTO: ABNORMAL
LYMPHOCYTES # BLD: 4 K/UL (ref 0.9–3.6)
LYMPHOCYTES NFR BLD: 31 % (ref 21–52)
MCH RBC QN AUTO: 26.2 PG (ref 24–34)
MCHC RBC AUTO-ENTMCNC: 32.1 G/DL (ref 31–37)
MCV RBC AUTO: 81.7 FL (ref 78–100)
MONOCYTES # BLD: 0.6 K/UL (ref 0.05–1.2)
MONOCYTES NFR BLD: 4 % (ref 3–10)
NEUTS SEG # BLD: 7.8 K/UL (ref 1.8–8)
NEUTS SEG NFR BLD: 62 % (ref 40–73)
NITRITE UR QL STRIP.AUTO: NEGATIVE
NRBC # BLD: 0 K/UL (ref 0–0.01)
NRBC BLD-RTO: 0 PER 100 WBC
PH UR STRIP: 6.5 (ref 5–8)
PLATELET # BLD AUTO: 467 K/UL (ref 135–420)
PMV BLD AUTO: 9 FL (ref 9.2–11.8)
POTASSIUM SERPL-SCNC: 4.3 MMOL/L (ref 3.5–5.5)
PROT SERPL-MCNC: 8.8 G/DL (ref 6.4–8.2)
PROT UR STRIP-MCNC: 30 MG/DL
RBC # BLD AUTO: 4.42 M/UL (ref 4.2–5.3)
RBC #/AREA URNS HPF: ABNORMAL /HPF (ref 0–5)
SODIUM SERPL-SCNC: 139 MMOL/L (ref 136–145)
SP GR UR REFRACTOMETRY: 1.02 (ref 1–1.03)
UROBILINOGEN UR QL STRIP.AUTO: 0.2 EU/DL (ref 0.2–1)
WBC # BLD AUTO: 12.7 K/UL (ref 4.6–13.2)
WBC URNS QL MICRO: ABNORMAL /HPF (ref 0–4)

## 2024-09-23 PROCEDURE — 93005 ELECTROCARDIOGRAM TRACING: CPT | Performed by: EMERGENCY MEDICINE

## 2024-09-23 PROCEDURE — 99284 EMERGENCY DEPT VISIT MOD MDM: CPT

## 2024-09-23 PROCEDURE — 87147 CULTURE TYPE IMMUNOLOGIC: CPT

## 2024-09-23 PROCEDURE — 96365 THER/PROPH/DIAG IV INF INIT: CPT

## 2024-09-23 PROCEDURE — 81001 URINALYSIS AUTO W/SCOPE: CPT

## 2024-09-23 PROCEDURE — 87070 CULTURE OTHR SPECIMN AEROBIC: CPT

## 2024-09-23 PROCEDURE — 6360000002 HC RX W HCPCS: Performed by: EMERGENCY MEDICINE

## 2024-09-23 PROCEDURE — 83605 ASSAY OF LACTIC ACID: CPT

## 2024-09-23 PROCEDURE — 85025 COMPLETE CBC W/AUTO DIFF WBC: CPT

## 2024-09-23 PROCEDURE — 80053 COMPREHEN METABOLIC PANEL: CPT

## 2024-09-23 PROCEDURE — 96375 TX/PRO/DX INJ NEW DRUG ADDON: CPT

## 2024-09-23 PROCEDURE — 2580000003 HC RX 258: Performed by: EMERGENCY MEDICINE

## 2024-09-23 PROCEDURE — 96366 THER/PROPH/DIAG IV INF ADDON: CPT

## 2024-09-23 PROCEDURE — 87205 SMEAR GRAM STAIN: CPT

## 2024-09-23 PROCEDURE — 6360000002 HC RX W HCPCS

## 2024-09-23 PROCEDURE — 96367 TX/PROPH/DG ADDL SEQ IV INF: CPT

## 2024-09-23 RX ORDER — CLINDAMYCIN PHOSPHATE 900 MG/50ML
900 INJECTION, SOLUTION INTRAVENOUS EVERY 8 HOURS
Status: DISCONTINUED | OUTPATIENT
Start: 2024-09-23 | End: 2024-09-23 | Stop reason: HOSPADM

## 2024-09-23 RX ORDER — MORPHINE SULFATE 4 MG/ML
4 INJECTION, SOLUTION INTRAMUSCULAR; INTRAVENOUS
Status: COMPLETED | OUTPATIENT
Start: 2024-09-23 | End: 2024-09-23

## 2024-09-23 RX ORDER — ONDANSETRON 2 MG/ML
4 INJECTION INTRAMUSCULAR; INTRAVENOUS
Status: COMPLETED | OUTPATIENT
Start: 2024-09-23 | End: 2024-09-23

## 2024-09-23 RX ORDER — VANCOMYCIN 1.75 G/350ML
1250 INJECTION, SOLUTION INTRAVENOUS EVERY 12 HOURS
Status: DISCONTINUED | OUTPATIENT
Start: 2024-09-24 | End: 2024-09-23 | Stop reason: HOSPADM

## 2024-09-23 RX ORDER — CLINDAMYCIN HCL 300 MG
300 CAPSULE ORAL 3 TIMES DAILY
Qty: 21 CAPSULE | Refills: 0 | Status: SHIPPED | OUTPATIENT
Start: 2024-09-23 | End: 2024-09-30

## 2024-09-23 RX ORDER — ONDANSETRON 2 MG/ML
INJECTION INTRAMUSCULAR; INTRAVENOUS
Status: COMPLETED
Start: 2024-09-23 | End: 2024-09-23

## 2024-09-23 RX ORDER — KETOROLAC TROMETHAMINE 15 MG/ML
30 INJECTION, SOLUTION INTRAMUSCULAR; INTRAVENOUS
Status: COMPLETED | OUTPATIENT
Start: 2024-09-23 | End: 2024-09-23

## 2024-09-23 RX ORDER — HYDROCODONE BITARTRATE AND ACETAMINOPHEN 5; 325 MG/1; MG/1
1 TABLET ORAL EVERY 6 HOURS PRN
Qty: 10 TABLET | Refills: 0 | Status: SHIPPED | OUTPATIENT
Start: 2024-09-23 | End: 2024-09-26

## 2024-09-23 RX ADMIN — KETOROLAC TROMETHAMINE 30 MG: 15 INJECTION, SOLUTION INTRAMUSCULAR; INTRAVENOUS at 21:08

## 2024-09-23 RX ADMIN — VANCOMYCIN HYDROCHLORIDE 2000 MG: 10 INJECTION, POWDER, LYOPHILIZED, FOR SOLUTION INTRAVENOUS at 18:09

## 2024-09-23 RX ADMIN — ONDANSETRON 4 MG: 2 INJECTION, SOLUTION INTRAMUSCULAR; INTRAVENOUS at 21:19

## 2024-09-23 RX ADMIN — ONDANSETRON 4 MG: 2 INJECTION, SOLUTION INTRAMUSCULAR; INTRAVENOUS at 21:09

## 2024-09-23 RX ADMIN — MORPHINE SULFATE 4 MG: 4 INJECTION, SOLUTION INTRAMUSCULAR; INTRAVENOUS at 18:28

## 2024-09-23 RX ADMIN — CLINDAMYCIN PHOSPHATE 900 MG: 150 INJECTION, SOLUTION INTRAMUSCULAR; INTRAVENOUS at 20:27

## 2024-09-23 RX ADMIN — ONDANSETRON 4 MG: 2 INJECTION INTRAMUSCULAR; INTRAVENOUS at 21:19

## 2024-09-23 ASSESSMENT — PAIN SCALES - GENERAL: PAINLEVEL_OUTOF10: 5

## 2024-09-23 ASSESSMENT — PAIN - FUNCTIONAL ASSESSMENT: PAIN_FUNCTIONAL_ASSESSMENT: ACTIVITIES ARE NOT PREVENTED

## 2024-09-23 ASSESSMENT — PAIN DESCRIPTION - FREQUENCY: FREQUENCY: CONTINUOUS

## 2024-09-23 ASSESSMENT — PAIN DESCRIPTION - ORIENTATION: ORIENTATION: ANTERIOR;POSTERIOR

## 2024-09-23 ASSESSMENT — PAIN DESCRIPTION - PAIN TYPE: TYPE: ACUTE PAIN

## 2024-09-23 ASSESSMENT — PAIN DESCRIPTION - LOCATION
LOCATION: COCCYX;ABDOMEN
LOCATION: GENERALIZED

## 2024-09-23 ASSESSMENT — PAIN DESCRIPTION - DESCRIPTORS: DESCRIPTORS: ACHING

## 2024-09-23 ASSESSMENT — PAIN DESCRIPTION - ONSET: ONSET: ON-GOING

## 2024-09-24 LAB
EKG ATRIAL RATE: 106 BPM
EKG DIAGNOSIS: NORMAL
EKG P AXIS: 52 DEGREES
EKG P-R INTERVAL: 130 MS
EKG Q-T INTERVAL: 322 MS
EKG QRS DURATION: 90 MS
EKG QTC CALCULATION (BAZETT): 427 MS
EKG R AXIS: 45 DEGREES
EKG T AXIS: 26 DEGREES
EKG VENTRICULAR RATE: 106 BPM

## 2024-09-24 PROCEDURE — 93010 ELECTROCARDIOGRAM REPORT: CPT | Performed by: INTERNAL MEDICINE

## 2024-09-25 LAB
BACTERIA SPEC CULT: ABNORMAL
GRAM STN SPEC: ABNORMAL
SERVICE CMNT-IMP: ABNORMAL

## 2024-09-26 LAB
BACTERIA SPEC CULT: ABNORMAL
BACTERIA SPEC CULT: ABNORMAL
GRAM STN SPEC: ABNORMAL
SERVICE CMNT-IMP: ABNORMAL

## 2025-06-30 ASSESSMENT — SLEEP AND FATIGUE QUESTIONNAIRES
HOW LIKELY ARE YOU TO NOD OFF OR FALL ASLEEP WHILE SITTING INACTIVE IN A PUBLIC PLACE: WOULD NEVER DOZE
HOW LIKELY ARE YOU TO NOD OFF OR FALL ASLEEP WHILE SITTING AND TALKING TO SOMEONE: WOULD NEVER DOZE
HOW LIKELY ARE YOU TO NOD OFF OR FALL ASLEEP WHILE SITTING AND TALKING TO SOMEONE: WOULD NEVER DOZE
ESS TOTAL SCORE: 7
HOW LIKELY ARE YOU TO NOD OFF OR FALL ASLEEP WHILE SITTING INACTIVE IN A PUBLIC PLACE: WOULD NEVER DOZE
HOW LIKELY ARE YOU TO NOD OFF OR FALL ASLEEP WHILE LYING DOWN TO REST IN THE AFTERNOON WHEN CIRCUMSTANCES PERMIT: MODERATE CHANCE OF DOZING
HOW LIKELY ARE YOU TO NOD OFF OR FALL ASLEEP WHILE SITTING QUIETLY AFTER LUNCH WITHOUT ALCOHOL: WOULD NEVER DOZE
HOW LIKELY ARE YOU TO NOD OFF OR FALL ASLEEP WHILE SITTING QUIETLY AFTER LUNCH WITHOUT ALCOHOL: WOULD NEVER DOZE
HOW LIKELY ARE YOU TO NOD OFF OR FALL ASLEEP WHILE SITTING AND READING: MODERATE CHANCE OF DOZING
HOW LIKELY ARE YOU TO NOD OFF OR FALL ASLEEP WHILE WATCHING TV: SLIGHT CHANCE OF DOZING
HOW LIKELY ARE YOU TO NOD OFF OR FALL ASLEEP IN A CAR, WHILE STOPPED FOR A FEW MINUTES IN TRAFFIC: WOULD NEVER DOZE
HOW LIKELY ARE YOU TO NOD OFF OR FALL ASLEEP WHEN YOU ARE A PASSENGER IN A CAR FOR AN HOUR WITHOUT A BREAK: MODERATE CHANCE OF DOZING
HOW LIKELY ARE YOU TO NOD OFF OR FALL ASLEEP WHEN YOU ARE A PASSENGER IN A CAR FOR AN HOUR WITHOUT A BREAK: MODERATE CHANCE OF DOZING
HOW LIKELY ARE YOU TO NOD OFF OR FALL ASLEEP WHILE SITTING AND READING: MODERATE CHANCE OF DOZING
HOW LIKELY ARE YOU TO NOD OFF OR FALL ASLEEP WHILE WATCHING TV: SLIGHT CHANCE OF DOZING
HOW LIKELY ARE YOU TO NOD OFF OR FALL ASLEEP WHILE LYING DOWN TO REST IN THE AFTERNOON WHEN CIRCUMSTANCES PERMIT: MODERATE CHANCE OF DOZING
HOW LIKELY ARE YOU TO NOD OFF OR FALL ASLEEP IN A CAR, WHILE STOPPED FOR A FEW MINUTES IN TRAFFIC: WOULD NEVER DOZE

## 2025-07-03 ENCOUNTER — OFFICE VISIT (OUTPATIENT)
Age: 30
End: 2025-07-03
Payer: MEDICARE

## 2025-07-03 VITALS
DIASTOLIC BLOOD PRESSURE: 89 MMHG | SYSTOLIC BLOOD PRESSURE: 152 MMHG | OXYGEN SATURATION: 97 % | HEIGHT: 64 IN | HEART RATE: 99 BPM | BODY MASS INDEX: 47.29 KG/M2 | TEMPERATURE: 97.7 F | WEIGHT: 277 LBS

## 2025-07-03 DIAGNOSIS — R35.1 NOCTURIA: ICD-10-CM

## 2025-07-03 DIAGNOSIS — F32.A DEPRESSION, UNSPECIFIED DEPRESSION TYPE: ICD-10-CM

## 2025-07-03 DIAGNOSIS — R06.83 LOUD SNORING: ICD-10-CM

## 2025-07-03 DIAGNOSIS — R29.818 SUSPECTED SLEEP APNEA: Primary | ICD-10-CM

## 2025-07-03 DIAGNOSIS — G47.30 INSOMNIA W/ SLEEP APNEA: ICD-10-CM

## 2025-07-03 DIAGNOSIS — G47.8 NON-RESTORATIVE SLEEP: ICD-10-CM

## 2025-07-03 DIAGNOSIS — E66.01 MORBID OBESITY WITH BODY MASS INDEX (BMI) OF 45.0 TO 49.9 IN ADULT (HCC): ICD-10-CM

## 2025-07-03 DIAGNOSIS — G47.00 INSOMNIA W/ SLEEP APNEA: ICD-10-CM

## 2025-07-03 DIAGNOSIS — L73.2 HIDRADENITIS SUPPURATIVA: ICD-10-CM

## 2025-07-03 DIAGNOSIS — G25.81 RESTLESS LEGS SYNDROME (RLS): ICD-10-CM

## 2025-07-03 DIAGNOSIS — G47.63 BRUXISM, SLEEP-RELATED: ICD-10-CM

## 2025-07-03 DIAGNOSIS — R51.9 SLEEP RELATED HEADACHES: ICD-10-CM

## 2025-07-03 DIAGNOSIS — I10 ESSENTIAL HYPERTENSION: ICD-10-CM

## 2025-07-03 PROCEDURE — 3079F DIAST BP 80-89 MM HG: CPT | Performed by: OTOLARYNGOLOGY

## 2025-07-03 PROCEDURE — 3077F SYST BP >= 140 MM HG: CPT | Performed by: OTOLARYNGOLOGY

## 2025-07-03 PROCEDURE — 99205 OFFICE O/P NEW HI 60 MIN: CPT | Performed by: OTOLARYNGOLOGY

## 2025-07-03 RX ORDER — CLONAZEPAM 2 MG/1
2 TABLET ORAL 2 TIMES DAILY PRN
COMMUNITY

## 2025-07-03 RX ORDER — SPIRONOLACTONE AND HYDROCHLOROTHIAZIDE 25; 25 MG/1; MG/1
TABLET ORAL
COMMUNITY
Start: 2025-05-17

## 2025-07-03 RX ORDER — ACETAMINOPHEN 500 MG
1000 TABLET ORAL EVERY 6 HOURS PRN
COMMUNITY
Start: 2025-05-19

## 2025-07-03 RX ORDER — BUPROPION HYDROCHLORIDE 150 MG/1
TABLET ORAL
COMMUNITY
Start: 2025-06-18

## 2025-07-03 RX ORDER — AMLODIPINE BESYLATE 10 MG/1
TABLET ORAL
COMMUNITY
Start: 2025-05-13

## 2025-07-03 RX ORDER — ALBUTEROL SULFATE 90 UG/1
2 INHALANT RESPIRATORY (INHALATION) EVERY 6 HOURS
COMMUNITY

## 2025-07-03 RX ORDER — ZOLPIDEM TARTRATE 5 MG/1
5 TABLET ORAL NIGHTLY PRN
Qty: 30 TABLET | Refills: 0 | Status: SHIPPED | OUTPATIENT
Start: 2025-07-03 | End: 2025-08-02

## 2025-07-03 RX ORDER — CHLORAL HYDRATE 500 MG
CAPSULE ORAL DAILY
COMMUNITY

## 2025-07-03 RX ORDER — FLAXSEED OIL 1000 MG
CAPSULE ORAL
COMMUNITY

## 2025-07-03 RX ORDER — NAPROXEN 500 MG/1
TABLET ORAL
COMMUNITY

## 2025-07-03 RX ORDER — BACILLUS COAGULANS/INULIN 1B-250 MG
CAPSULE ORAL
COMMUNITY

## 2025-07-03 RX ORDER — M-VIT,TX,IRON,MINS/CALC/FOLIC 27MG-0.4MG
1 TABLET ORAL DAILY
COMMUNITY

## 2025-07-03 RX ORDER — CYCLOBENZAPRINE HCL 5 MG
TABLET ORAL
COMMUNITY

## 2025-07-03 RX ORDER — MULTIVIT WITH MINERALS/LUTEIN
400 TABLET ORAL DAILY
COMMUNITY

## 2025-07-03 ASSESSMENT — PATIENT HEALTH QUESTIONNAIRE - PHQ9
2. FEELING DOWN, DEPRESSED OR HOPELESS: SEVERAL DAYS
1. LITTLE INTEREST OR PLEASURE IN DOING THINGS: SEVERAL DAYS
SUM OF ALL RESPONSES TO PHQ QUESTIONS 1-9: 2

## 2025-07-03 NOTE — PROGRESS NOTES
Jazmin AIDEE Daniels presents today for   Chief Complaint   Patient presents with    Snoring    Sleep Problem       Is someone accompanying this pt? no    Is the patient using any DME equipment during OV? no    -DME Company n/a    Have you ever had a sleep study done before? no    Depression Screenin/3/2025     3:28 PM   PHQ-9    Little interest or pleasure in doing things 1   Feeling down, depressed, or hopeless 1   PHQ-2 Score 2   PHQ-9 Total Score 2        [place] Sleepiness Scale:      2025     7:30 AM   Sleep Medicine   Sitting and reading 2   Watching TV 1   Sitting, inactive in a public place (e.g. a theatre or a meeting) 0   As a passenger in a car for an hour without a break 2   Lying down to rest in the afternoon when circumstances permit 2   Sitting and talking to someone 0   Sitting quietly after a lunch without alcohol 0   In a car, while stopped for a few minutes in traffic 0   [place] Sleepiness Score 7    Neck (Inches) 15       Patient-reported       Stop-Ban/3/2025     3:00 PM   STOP-BANG QUESTIONNAIRE   Are you a loud and/or regular snorer? 1   Do you often feel tired or groggy upon awakening or do you awaken with a headache? 1   Have you been observed to gasp or stop breathing during sleep? 0   Are you often tired or fatigued during wake time hours?  0   Do you fall asleep sitting, reading, watching TV or driving? 0   Do you often have problems with memory or concentration? 0   Do you have or are you being treated for high blood pressure? 1   Recent BMI (Calculated) 46.1   Is BMI greater than 35 kg/m2? 1=Yes   Age older than 50 years old? 0=No   Is your neck circumference greater than 17 inches (Male) or 16 inches (Female)? 0   Gender - Male 0=No   STOP-Bang Total Score 50.1         Coordination of Care:  1. Have you been to the ER, urgent care clinic since your last visit? Hospitalized since your last visit? no    2. Have you seen or consulted any other health care providers

## 2025-07-03 NOTE — ASSESSMENT & PLAN NOTE
Patient stated that this problem is \"better lately\" and does not bother her nightly.  Was taking iron but now is taking a multivitamin instead.  I do not see a recent ferritin or iron level in her labs but it may be that she had this done elsewhere.

## 2025-07-03 NOTE — ASSESSMENT & PLAN NOTE
Monitored by specialist- no acute findings meriting change in the plan, dramatically better with Remicade infusions.  Will not be able to sleep on her back during a sleep study.

## 2025-07-03 NOTE — PATIENT INSTRUCTIONS
Please make a follow up appointment to discuss the results of your sleep study or I will send you a \"my chart\" message with your results and treatment options.     The [facility] Sleep Lab is located in the [facility], adjacent to [facility]. The lab is on the second floor. The direct number to call for sleep study related questions is: [phone].    Please call our clinic back at [phone] or send a message on [employer] if you have any questions or concerns or if you are experiencing any of the following:     You have not received a follow up appointment within 30 days prior the recommended follow up time.    If you are not tolerating treatment plan and/or not able to obtain equipment or prescribed medication(s).  if you are experiencing any difficulties with the Durable Medical Equipment  (DME) Company you may be using or is assigned to you.  Two weeks have passed and you have not received an appointment for a scheduled procedure.  Two weeks have passed since you underwent a test and/or procedure and you have not received your results.  Your  Durable Medical Equipment (DME ) company is supposed to provide you with replacement filters, tubing and masks. You can either call your DME when you need new supplies or you can arrange for an automatic shipment schedule.    Your need to be seen by our office at lat minimum of every 12 months in order to renew the prescription for these supplies.   Please make note of who your DME company is and their phone number.   Please make sure that you clean your mask and hosing on a regular basis.  Your DME can provide you with additional information regarding proper care and cleaning of your device     Safety is strongly encouraged.  You should not drive if sleepy, tired, distracted and/or fatigued.      Chest Xrays and blood work do not require appointments.  They are considered \"Walk-In\" services and can be obtained at either [facility] or [facility]

## 2025-07-03 NOTE — PROGRESS NOTES
[facility]   Sleep Medicine     Office Progress Note - Initial Evaluation        [address]  [address]  [facility] [address] [phone] Fax    Reason for visit/referral: Evaluation for possible obstructive sleep apnea/sleep disordered breathing  Assessment:      1. Suspected sleep apnea  -     Polysomnography (09960); Future  2. Insomnia w/ sleep apnea  -     zolpidem (AMBIEN) 5 MG tablet; Take 1 tablet by mouth nightly as needed for Sleep for up to 30 days. Max Daily Amount: 5 mg, Disp-30 tablet, R-0Normal  -     Polysomnography (74390); Future  3. Loud snoring  4. Sleep related headaches  5. Nocturia  6. Non-restorative sleep  7. Restless legs syndrome (RLS)  Assessment & Plan:   Patient stated that this problem is \"better lately\" and does not bother her nightly.  Was taking iron but now is taking a multivitamin instead.  I do not see a recent ferritin or iron level in her labs but it may be that she had this done elsewhere.  8. Bruxism, sleep-related  9. Morbid obesity with body mass index (BMI) of 45.0 to 49.9 in adult (HCC)  Assessment & Plan:   He is being treated through the [facility] weight loss clinic.  10. Essential hypertension  Assessment & Plan:  Treatment resistant, Chronic, not at goal (unstable), continue current treatment plan, blood pressure 152/89 and pulse 99 today.  Asymptomatic.  Managed by PCP, takes Norvasc.  11. Depression, unspecified depression type  Assessment & Plan:   Unclear control, takes Wellbutrin and Klonopin as needed, managed by PCP  12. Hidradenitis suppurativa  Assessment & Plan:   Monitored by specialist- no acute findings meriting change in the plan, dramatically better with Remicade infusions.  Will not be able to sleep on her back during a sleep study.     Jazmin Daniels is a 29-year-old female with a history of obesity, hypertension, depression, hidradenitis suppurativa, migraines, asthma, PCOS and is a prior tobacco user,

## 2025-07-03 NOTE — ASSESSMENT & PLAN NOTE
Treatment resistant, Chronic, not at goal (unstable), continue current treatment plan, blood pressure 152/89 and pulse 99 today.  Asymptomatic.  Managed by PCP, takes Norvasc.

## 2025-08-08 ENCOUNTER — HOSPITAL ENCOUNTER (OUTPATIENT)
Dept: SLEEP MEDICINE | Facility: HOSPITAL | Age: 30
Discharge: HOME | End: 2025-08-11
Attending: OTOLARYNGOLOGY
Payer: MEDICARE

## 2025-08-08 DIAGNOSIS — G47.30 INSOMNIA W/ SLEEP APNEA: ICD-10-CM

## 2025-08-08 DIAGNOSIS — G47.00 INSOMNIA W/ SLEEP APNEA: ICD-10-CM

## 2025-08-08 DIAGNOSIS — R29.818 SUSPECTED SLEEP APNEA: ICD-10-CM

## 2025-08-08 PROCEDURE — 95810 POLYSOM 6/> YRS 4/> PARAM: CPT

## 2025-08-09 VITALS
BODY MASS INDEX: 47.65 KG/M2 | HEIGHT: 64 IN | SYSTOLIC BLOOD PRESSURE: 126 MMHG | DIASTOLIC BLOOD PRESSURE: 77 MMHG | HEART RATE: 90 BPM | WEIGHT: 279.13 LBS

## 2025-08-09 ASSESSMENT — SLEEP AND FATIGUE QUESTIONNAIRES
HOW LIKELY ARE YOU TO NOD OFF OR FALL ASLEEP IN A CAR, WHILE STOPPED FOR A FEW MINUTES IN TRAFFIC: WOULD NEVER DOZE
HOW LIKELY ARE YOU TO NOD OFF OR FALL ASLEEP WHILE SITTING AND TALKING TO SOMEONE: WOULD NEVER DOZE
HOW LIKELY ARE YOU TO NOD OFF OR FALL ASLEEP WHILE LYING DOWN TO REST IN THE AFTERNOON WHEN CIRCUMSTANCES PERMIT: HIGH CHANCE OF DOZING
HOW LIKELY ARE YOU TO NOD OFF OR FALL ASLEEP WHILE SITTING QUIETLY AFTER LUNCH WITHOUT ALCOHOL: MODERATE CHANCE OF DOZING
HOW LIKELY ARE YOU TO NOD OFF OR FALL ASLEEP WHILE WATCHING TV: WOULD NEVER DOZE
HOW LIKELY ARE YOU TO NOD OFF OR FALL ASLEEP WHEN YOU ARE A PASSENGER IN A CAR FOR AN HOUR WITHOUT A BREAK: MODERATE CHANCE OF DOZING
HOW LIKELY ARE YOU TO NOD OFF OR FALL ASLEEP WHILE SITTING INACTIVE IN A PUBLIC PLACE: WOULD NEVER DOZE
ESS TOTAL SCORE: 8
HOW LIKELY ARE YOU TO NOD OFF OR FALL ASLEEP WHILE SITTING AND READING: SLIGHT CHANCE OF DOZING

## 2025-08-29 ASSESSMENT — SLEEP AND FATIGUE QUESTIONNAIRES
HOW LIKELY ARE YOU TO NOD OFF OR FALL ASLEEP WHILE SITTING QUIETLY AFTER LUNCH WITHOUT ALCOHOL: WOULD NEVER DOZE
HOW LIKELY ARE YOU TO NOD OFF OR FALL ASLEEP WHILE SITTING INACTIVE IN A PUBLIC PLACE: WOULD NEVER DOZE
HOW LIKELY ARE YOU TO NOD OFF OR FALL ASLEEP WHILE SITTING INACTIVE IN A PUBLIC PLACE: WOULD NEVER DOZE
HOW LIKELY ARE YOU TO NOD OFF OR FALL ASLEEP WHEN YOU ARE A PASSENGER IN A CAR FOR AN HOUR WITHOUT A BREAK: MODERATE CHANCE OF DOZING
HOW LIKELY ARE YOU TO NOD OFF OR FALL ASLEEP WHILE SITTING QUIETLY AFTER LUNCH WITHOUT ALCOHOL: WOULD NEVER DOZE
HOW LIKELY ARE YOU TO NOD OFF OR FALL ASLEEP WHILE WATCHING TV: SLIGHT CHANCE OF DOZING
HOW LIKELY ARE YOU TO NOD OFF OR FALL ASLEEP WHILE LYING DOWN TO REST IN THE AFTERNOON WHEN CIRCUMSTANCES PERMIT: HIGH CHANCE OF DOZING
HOW LIKELY ARE YOU TO NOD OFF OR FALL ASLEEP IN A CAR, WHILE STOPPED FOR A FEW MINUTES IN TRAFFIC: WOULD NEVER DOZE
HOW LIKELY ARE YOU TO NOD OFF OR FALL ASLEEP WHILE LYING DOWN TO REST IN THE AFTERNOON WHEN CIRCUMSTANCES PERMIT: HIGH CHANCE OF DOZING
HOW LIKELY ARE YOU TO NOD OFF OR FALL ASLEEP WHILE WATCHING TV: SLIGHT CHANCE OF DOZING
HOW LIKELY ARE YOU TO NOD OFF OR FALL ASLEEP IN A CAR, WHILE STOPPED FOR A FEW MINUTES IN TRAFFIC: WOULD NEVER DOZE
ESS TOTAL SCORE: 7
HOW LIKELY ARE YOU TO NOD OFF OR FALL ASLEEP WHEN YOU ARE A PASSENGER IN A CAR FOR AN HOUR WITHOUT A BREAK: MODERATE CHANCE OF DOZING
HOW LIKELY ARE YOU TO NOD OFF OR FALL ASLEEP WHILE SITTING AND READING: SLIGHT CHANCE OF DOZING
HOW LIKELY ARE YOU TO NOD OFF OR FALL ASLEEP WHILE SITTING AND TALKING TO SOMEONE: WOULD NEVER DOZE
HOW LIKELY ARE YOU TO NOD OFF OR FALL ASLEEP WHILE SITTING AND TALKING TO SOMEONE: WOULD NEVER DOZE
HOW LIKELY ARE YOU TO NOD OFF OR FALL ASLEEP WHILE SITTING AND READING: SLIGHT CHANCE OF DOZING

## 2025-09-04 PROBLEM — R06.83 SNORING: Status: ACTIVE | Noted: 2025-09-04

## 2025-09-04 RX ORDER — HYDROCODONE BITARTRATE AND ACETAMINOPHEN 5; 325 MG/1; MG/1
1 TABLET ORAL EVERY 6 HOURS PRN
COMMUNITY
Start: 2025-07-21

## 2025-09-04 RX ORDER — AMLODIPINE BESYLATE AND ATORVASTATIN CALCIUM 10; 10 MG/1; MG/1
1 TABLET, FILM COATED ORAL DAILY
COMMUNITY

## 2025-09-04 RX ORDER — ORLISTAT 120 MG/1
120 CAPSULE ORAL 3 TIMES DAILY
COMMUNITY
Start: 2025-06-23

## 2025-09-04 RX ORDER — SUMATRIPTAN 50 MG/1
50 TABLET, FILM COATED ORAL PRN
COMMUNITY
Start: 2025-07-08

## 2025-09-04 RX ORDER — TOPIRAMATE 25 MG/1
25 TABLET, FILM COATED ORAL DAILY
COMMUNITY
Start: 2025-07-08

## 2025-09-04 RX ORDER — ESCITALOPRAM OXALATE 10 MG/1
10 TABLET ORAL DAILY
COMMUNITY
Start: 2025-07-10

## 2025-09-04 RX ORDER — IBUPROFEN 800 MG/1
800 TABLET, FILM COATED ORAL EVERY 6 HOURS PRN
COMMUNITY
Start: 2025-07-08

## 2025-09-04 RX ORDER — SODIUM CAPRYLATE
1 POWDER (GRAM) MISCELLANEOUS DAILY
COMMUNITY
Start: 2025-05-14

## 2025-09-04 RX ORDER — OXYCODONE HYDROCHLORIDE 5 MG/1
5 TABLET ORAL EVERY 8 HOURS PRN
COMMUNITY
Start: 2025-06-09

## 2025-09-04 RX ORDER — ZOLPIDEM TARTRATE 5 MG/1
5 TABLET ORAL NIGHTLY PRN
COMMUNITY
Start: 2025-07-06

## 2025-09-05 ENCOUNTER — OFFICE VISIT (OUTPATIENT)
Age: 30
End: 2025-09-05
Payer: MEDICARE

## 2025-09-05 VITALS
OXYGEN SATURATION: 97 % | WEIGHT: 274.8 LBS | HEIGHT: 64 IN | SYSTOLIC BLOOD PRESSURE: 145 MMHG | DIASTOLIC BLOOD PRESSURE: 78 MMHG | HEART RATE: 92 BPM | TEMPERATURE: 98.8 F | BODY MASS INDEX: 46.92 KG/M2 | RESPIRATION RATE: 17 BRPM

## 2025-09-05 DIAGNOSIS — E66.01 MORBID OBESITY WITH BODY MASS INDEX (BMI) OF 45.0 TO 49.9 IN ADULT (HCC): ICD-10-CM

## 2025-09-05 DIAGNOSIS — F32.A DEPRESSION, UNSPECIFIED DEPRESSION TYPE: ICD-10-CM

## 2025-09-05 DIAGNOSIS — I10 ESSENTIAL HYPERTENSION: ICD-10-CM

## 2025-09-05 DIAGNOSIS — R06.83 SNORING: Primary | ICD-10-CM

## 2025-09-05 PROCEDURE — 99213 OFFICE O/P EST LOW 20 MIN: CPT | Performed by: OTOLARYNGOLOGY

## 2025-09-05 PROCEDURE — 3078F DIAST BP <80 MM HG: CPT | Performed by: OTOLARYNGOLOGY

## 2025-09-05 PROCEDURE — 3077F SYST BP >= 140 MM HG: CPT | Performed by: OTOLARYNGOLOGY

## 2025-09-05 ASSESSMENT — PATIENT HEALTH QUESTIONNAIRE - PHQ9
SUM OF ALL RESPONSES TO PHQ QUESTIONS 1-9: 0
2. FEELING DOWN, DEPRESSED OR HOPELESS: NOT AT ALL
SUM OF ALL RESPONSES TO PHQ QUESTIONS 1-9: 0
1. LITTLE INTEREST OR PLEASURE IN DOING THINGS: NOT AT ALL
SUM OF ALL RESPONSES TO PHQ QUESTIONS 1-9: 0
SUM OF ALL RESPONSES TO PHQ QUESTIONS 1-9: 0